# Patient Record
Sex: FEMALE | Race: WHITE | NOT HISPANIC OR LATINO | Employment: OTHER | URBAN - METROPOLITAN AREA
[De-identification: names, ages, dates, MRNs, and addresses within clinical notes are randomized per-mention and may not be internally consistent; named-entity substitution may affect disease eponyms.]

---

## 2017-01-03 ENCOUNTER — ALLSCRIPTS OFFICE VISIT (OUTPATIENT)
Dept: OTHER | Facility: OTHER | Age: 77
End: 2017-01-03

## 2017-01-11 ENCOUNTER — HOSPITAL ENCOUNTER (OUTPATIENT)
Dept: RADIOLOGY | Facility: CLINIC | Age: 77
Discharge: HOME | End: 2017-01-11
Payer: MEDICARE

## 2017-01-11 ENCOUNTER — ALLSCRIPTS OFFICE VISIT (OUTPATIENT)
Dept: OTHER | Facility: OTHER | Age: 77
End: 2017-01-11

## 2017-01-11 DIAGNOSIS — S92.351A CLOSED DISPLACED FRACTURE OF FIFTH METATARSAL BONE OF RIGHT FOOT: ICD-10-CM

## 2017-01-11 PROCEDURE — 73630 X-RAY EXAM OF FOOT: CPT

## 2017-01-17 ENCOUNTER — HOSPITAL ENCOUNTER (OUTPATIENT)
Facility: HOSPITAL | Age: 77
Setting detail: OBSERVATION
Discharge: HOME | End: 2017-01-18
Attending: EMERGENCY MEDICINE | Admitting: INTERNAL MEDICINE
Payer: MEDICARE

## 2017-01-17 ENCOUNTER — APPOINTMENT (EMERGENCY)
Dept: RADIOLOGY | Facility: HOSPITAL | Age: 77
End: 2017-01-17
Payer: MEDICARE

## 2017-01-17 DIAGNOSIS — R07.9 CHEST PAIN: Primary | ICD-10-CM

## 2017-01-17 LAB
ANION GAP SERPL CALCULATED.3IONS-SCNC: 11 MMOL/L (ref 4–13)
BASOPHILS # BLD AUTO: 0 THOUSANDS/ΜL (ref 0–0.1)
BASOPHILS NFR BLD AUTO: 1 % (ref 0–1)
BUN SERPL-MCNC: 11 MG/DL (ref 5–25)
CALCIUM SERPL-MCNC: 9.6 MG/DL (ref 8.3–10.1)
CHLORIDE SERPL-SCNC: 101 MMOL/L (ref 100–108)
CO2 SERPL-SCNC: 28 MMOL/L (ref 21–32)
CREAT SERPL-MCNC: 0.76 MG/DL (ref 0.6–1.3)
DEPRECATED D DIMER PPP: <190 NG/ML (FEU) (ref 190–520)
EOSINOPHIL # BLD AUTO: 0 THOUSAND/ΜL (ref 0–0.61)
EOSINOPHIL NFR BLD AUTO: 1 % (ref 0–6)
ERYTHROCYTE [DISTWIDTH] IN BLOOD BY AUTOMATED COUNT: 13.2 % (ref 11.6–15.1)
GFR SERPL CREATININE-BSD FRML MDRD: >60 ML/MIN/1.73SQ M
GLUCOSE SERPL-MCNC: 91 MG/DL (ref 65–140)
HCT VFR BLD AUTO: 42.3 % (ref 37–47)
HGB BLD-MCNC: 14 G/DL (ref 12–16)
INR PPP: 1.29 (ref 0.86–1.16)
LYMPHOCYTES # BLD AUTO: 2.7 THOUSANDS/ΜL (ref 0.6–4.47)
LYMPHOCYTES NFR BLD AUTO: 41 % (ref 14–44)
MCH RBC QN AUTO: 31.3 PG (ref 27–31)
MCHC RBC AUTO-ENTMCNC: 33.1 G/DL (ref 31.4–37.4)
MCV RBC AUTO: 95 FL (ref 82–98)
MONOCYTES # BLD AUTO: 0.5 THOUSAND/ΜL (ref 0.17–1.22)
MONOCYTES NFR BLD AUTO: 7 % (ref 4–12)
NEUTROPHILS # BLD AUTO: 3.3 THOUSANDS/ΜL (ref 1.85–7.62)
NEUTS SEG NFR BLD AUTO: 51 % (ref 43–75)
NRBC BLD AUTO-RTO: 0 /100 WBCS
PLATELET # BLD AUTO: 278 THOUSANDS/UL (ref 130–400)
PMV BLD AUTO: 7.1 FL (ref 8.9–12.7)
POTASSIUM SERPL-SCNC: 3.8 MMOL/L (ref 3.5–5.3)
PROTHROMBIN TIME: 13.7 SECONDS (ref 9.4–11.7)
RBC # BLD AUTO: 4.47 MILLION/UL (ref 4.2–5.4)
SODIUM SERPL-SCNC: 140 MMOL/L (ref 136–145)
TROPONIN I SERPL-MCNC: <0.02 NG/ML
TROPONIN I SERPL-MCNC: <0.02 NG/ML
WBC # BLD AUTO: 6.6 THOUSAND/UL (ref 4.8–10.8)

## 2017-01-17 PROCEDURE — 84484 ASSAY OF TROPONIN QUANT: CPT | Performed by: EMERGENCY MEDICINE

## 2017-01-17 PROCEDURE — 80048 BASIC METABOLIC PNL TOTAL CA: CPT | Performed by: EMERGENCY MEDICINE

## 2017-01-17 PROCEDURE — 36415 COLL VENOUS BLD VENIPUNCTURE: CPT | Performed by: EMERGENCY MEDICINE

## 2017-01-17 PROCEDURE — 99285 EMERGENCY DEPT VISIT HI MDM: CPT

## 2017-01-17 PROCEDURE — 84484 ASSAY OF TROPONIN QUANT: CPT | Performed by: FAMILY MEDICINE

## 2017-01-17 PROCEDURE — 71020 HB CHEST X-RAY 2VW FRONTAL&LATL: CPT

## 2017-01-17 PROCEDURE — 93005 ELECTROCARDIOGRAM TRACING: CPT | Performed by: EMERGENCY MEDICINE

## 2017-01-17 PROCEDURE — 85379 FIBRIN DEGRADATION QUANT: CPT | Performed by: EMERGENCY MEDICINE

## 2017-01-17 PROCEDURE — 85610 PROTHROMBIN TIME: CPT | Performed by: EMERGENCY MEDICINE

## 2017-01-17 PROCEDURE — 85025 COMPLETE CBC W/AUTO DIFF WBC: CPT | Performed by: EMERGENCY MEDICINE

## 2017-01-17 RX ORDER — CHOLESTYRAMINE LIGHT 4 G/5.7G
4 POWDER, FOR SUSPENSION ORAL 2 TIMES DAILY
Status: DISCONTINUED | OUTPATIENT
Start: 2017-01-17 | End: 2017-01-18 | Stop reason: HOSPADM

## 2017-01-17 RX ORDER — WARFARIN SODIUM 5 MG/1
5 TABLET ORAL
Status: COMPLETED | OUTPATIENT
Start: 2017-01-17 | End: 2017-01-17

## 2017-01-17 RX ORDER — PANTOPRAZOLE SODIUM 40 MG/1
40 TABLET, DELAYED RELEASE ORAL DAILY
Status: DISCONTINUED | OUTPATIENT
Start: 2017-01-18 | End: 2017-01-18 | Stop reason: HOSPADM

## 2017-01-17 RX ORDER — WARFARIN SODIUM 3 MG/1
3 TABLET ORAL
Status: DISCONTINUED | OUTPATIENT
Start: 2017-01-18 | End: 2017-01-18

## 2017-01-17 RX ORDER — ACETAMINOPHEN 325 MG/1
650 TABLET ORAL EVERY 6 HOURS PRN
Status: DISCONTINUED | OUTPATIENT
Start: 2017-01-17 | End: 2017-01-18 | Stop reason: HOSPADM

## 2017-01-17 RX ORDER — NEBIVOLOL 5 MG/1
5 TABLET ORAL
Status: DISCONTINUED | OUTPATIENT
Start: 2017-01-18 | End: 2017-01-18 | Stop reason: HOSPADM

## 2017-01-17 RX ORDER — NEBIVOLOL 10 MG/1
10 TABLET ORAL EVERY MORNING
Status: DISCONTINUED | OUTPATIENT
Start: 2017-01-18 | End: 2017-01-18 | Stop reason: HOSPADM

## 2017-01-17 RX ADMIN — ENOXAPARIN SODIUM 50 MG: 60 INJECTION SUBCUTANEOUS at 18:45

## 2017-01-17 RX ADMIN — WARFARIN SODIUM 5 MG: 5 TABLET ORAL at 21:41

## 2017-01-17 RX ADMIN — NITROGLYCERIN 1 INCH: 20 OINTMENT TOPICAL at 14:11

## 2017-01-17 RX ADMIN — ACETAMINOPHEN 650 MG: 325 TABLET, FILM COATED ORAL at 21:40

## 2017-01-18 ENCOUNTER — APPOINTMENT (OUTPATIENT)
Dept: NON INVASIVE DIAGNOSTICS | Facility: HOSPITAL | Age: 77
End: 2017-01-18
Payer: MEDICARE

## 2017-01-18 ENCOUNTER — APPOINTMENT (OUTPATIENT)
Dept: RADIOLOGY | Facility: HOSPITAL | Age: 77
End: 2017-01-18
Payer: MEDICARE

## 2017-01-18 VITALS
TEMPERATURE: 97.2 F | HEIGHT: 60 IN | SYSTOLIC BLOOD PRESSURE: 143 MMHG | BODY MASS INDEX: 19.69 KG/M2 | DIASTOLIC BLOOD PRESSURE: 64 MMHG | OXYGEN SATURATION: 99 % | WEIGHT: 100.31 LBS | RESPIRATION RATE: 18 BRPM | HEART RATE: 74 BPM

## 2017-01-18 PROBLEM — Z86.711 HISTORY OF PULMONARY EMBOLISM: Status: ACTIVE | Noted: 2017-01-18

## 2017-01-18 PROBLEM — I16.0 HYPERTENSIVE URGENCY: Status: RESOLVED | Noted: 2017-01-18 | Resolved: 2017-01-18

## 2017-01-18 PROBLEM — I16.0 HYPERTENSIVE URGENCY: Status: ACTIVE | Noted: 2017-01-18

## 2017-01-18 PROBLEM — R91.1 NODULE OF RIGHT LUNG: Status: ACTIVE | Noted: 2017-01-18

## 2017-01-18 PROBLEM — R07.9 CHEST PAIN: Status: ACTIVE | Noted: 2017-01-18

## 2017-01-18 LAB
INR PPP: 1.37 (ref 0.86–1.16)
PROTHROMBIN TIME: 14.5 SECONDS (ref 9.4–11.7)
TROPONIN I SERPL-MCNC: <0.02 NG/ML

## 2017-01-18 PROCEDURE — 93017 CV STRESS TEST TRACING ONLY: CPT

## 2017-01-18 PROCEDURE — G8987 SELF CARE CURRENT STATUS: HCPCS

## 2017-01-18 PROCEDURE — 78452 HT MUSCLE IMAGE SPECT MULT: CPT

## 2017-01-18 PROCEDURE — 87081 CULTURE SCREEN ONLY: CPT | Performed by: INTERNAL MEDICINE

## 2017-01-18 PROCEDURE — 85610 PROTHROMBIN TIME: CPT | Performed by: FAMILY MEDICINE

## 2017-01-18 PROCEDURE — 84484 ASSAY OF TROPONIN QUANT: CPT | Performed by: FAMILY MEDICINE

## 2017-01-18 PROCEDURE — G8988 SELF CARE GOAL STATUS: HCPCS

## 2017-01-18 PROCEDURE — 97165 OT EVAL LOW COMPLEX 30 MIN: CPT

## 2017-01-18 PROCEDURE — G8979 MOBILITY GOAL STATUS: HCPCS

## 2017-01-18 PROCEDURE — A9502 TC99M TETROFOSMIN: HCPCS

## 2017-01-18 PROCEDURE — 93306 TTE W/DOPPLER COMPLETE: CPT

## 2017-01-18 PROCEDURE — G8978 MOBILITY CURRENT STATUS: HCPCS

## 2017-01-18 PROCEDURE — 97161 PT EVAL LOW COMPLEX 20 MIN: CPT

## 2017-01-18 PROCEDURE — 71250 CT THORAX DX C-: CPT

## 2017-01-18 RX ORDER — WARFARIN SODIUM 5 MG/1
5 TABLET ORAL
Status: DISCONTINUED | OUTPATIENT
Start: 2017-01-18 | End: 2017-01-18 | Stop reason: HOSPADM

## 2017-01-18 RX ADMIN — REGADENOSON 0.4 MG: 0.08 INJECTION, SOLUTION INTRAVENOUS at 13:56

## 2017-01-18 RX ADMIN — ENOXAPARIN SODIUM 50 MG: 60 INJECTION SUBCUTANEOUS at 09:18

## 2017-01-18 RX ADMIN — NEBIVOLOL HYDROCHLORIDE 10 MG: 10 TABLET ORAL at 09:17

## 2017-01-18 RX ADMIN — NEBIVOLOL HYDROCHLORIDE 5 MG: 5 TABLET ORAL at 12:23

## 2017-01-18 RX ADMIN — ENOXAPARIN SODIUM 50 MG: 60 INJECTION SUBCUTANEOUS at 18:34

## 2017-01-18 RX ADMIN — WARFARIN SODIUM 5 MG: 5 TABLET ORAL at 18:34

## 2017-01-18 RX ADMIN — PANTOPRAZOLE SODIUM 40 MG: 40 TABLET, DELAYED RELEASE ORAL at 09:16

## 2017-01-18 RX ADMIN — ACETAMINOPHEN 650 MG: 325 TABLET, FILM COATED ORAL at 06:26

## 2017-01-18 RX ADMIN — CHOLESTYRAMINE 4 G: 4 POWDER, FOR SUSPENSION ORAL at 09:15

## 2017-01-19 LAB — MRSA NOSE QL CULT: NORMAL

## 2017-02-08 ENCOUNTER — HOSPITAL ENCOUNTER (OUTPATIENT)
Dept: RADIOLOGY | Facility: CLINIC | Age: 77
Discharge: HOME | End: 2017-02-08
Payer: MEDICARE

## 2017-02-08 ENCOUNTER — ALLSCRIPTS OFFICE VISIT (OUTPATIENT)
Dept: OTHER | Facility: OTHER | Age: 77
End: 2017-02-08

## 2017-02-08 DIAGNOSIS — S92.351A CLOSED DISPLACED FRACTURE OF FIFTH METATARSAL BONE OF RIGHT FOOT: ICD-10-CM

## 2017-02-08 PROCEDURE — 73630 X-RAY EXAM OF FOOT: CPT

## 2017-02-27 ENCOUNTER — HOSPITAL ENCOUNTER (OUTPATIENT)
Dept: RADIOLOGY | Facility: CLINIC | Age: 77
Discharge: HOME | End: 2017-02-27
Payer: MEDICARE

## 2017-02-27 ENCOUNTER — ALLSCRIPTS OFFICE VISIT (OUTPATIENT)
Dept: OTHER | Facility: OTHER | Age: 77
End: 2017-02-27

## 2017-02-27 DIAGNOSIS — S92.351A CLOSED DISPLACED FRACTURE OF FIFTH METATARSAL BONE OF RIGHT FOOT: ICD-10-CM

## 2017-02-27 PROCEDURE — 73630 X-RAY EXAM OF FOOT: CPT

## 2017-04-10 ENCOUNTER — ALLSCRIPTS OFFICE VISIT (OUTPATIENT)
Dept: OTHER | Facility: OTHER | Age: 77
End: 2017-04-10

## 2017-04-10 ENCOUNTER — HOSPITAL ENCOUNTER (OUTPATIENT)
Dept: RADIOLOGY | Facility: CLINIC | Age: 77
Discharge: HOME | End: 2017-04-10
Payer: MEDICARE

## 2017-04-10 ENCOUNTER — TRANSCRIBE ORDERS (OUTPATIENT)
Dept: ADMINISTRATIVE | Facility: HOSPITAL | Age: 77
End: 2017-04-10

## 2017-04-10 DIAGNOSIS — S92.351A CLOSED DISPLACED FRACTURE OF FIFTH METATARSAL BONE OF RIGHT FOOT: ICD-10-CM

## 2017-04-10 DIAGNOSIS — M81.0 SENILE OSTEOPOROSIS: Primary | ICD-10-CM

## 2017-04-10 PROCEDURE — 73630 X-RAY EXAM OF FOOT: CPT

## 2017-04-17 ENCOUNTER — HOSPITAL ENCOUNTER (OUTPATIENT)
Dept: RADIOLOGY | Facility: HOSPITAL | Age: 77
Discharge: HOME | End: 2017-04-17
Payer: MEDICARE

## 2017-04-17 DIAGNOSIS — M81.0 SENILE OSTEOPOROSIS: ICD-10-CM

## 2017-04-17 PROCEDURE — 77080 DXA BONE DENSITY AXIAL: CPT

## 2017-06-21 ENCOUNTER — ALLSCRIPTS OFFICE VISIT (OUTPATIENT)
Dept: OTHER | Facility: OTHER | Age: 77
End: 2017-06-21

## 2017-06-21 DIAGNOSIS — M75.101 RIGHT ROTATOR CUFF TEAR: ICD-10-CM

## 2017-07-03 ENCOUNTER — GENERIC CONVERSION - ENCOUNTER (OUTPATIENT)
Dept: OTHER | Facility: OTHER | Age: 77
End: 2017-07-03

## 2017-08-02 ENCOUNTER — ALLSCRIPTS OFFICE VISIT (OUTPATIENT)
Dept: OTHER | Facility: OTHER | Age: 77
End: 2017-08-02

## 2017-08-02 DIAGNOSIS — M75.101 RIGHT ROTATOR CUFF TEAR: ICD-10-CM

## 2017-08-07 ENCOUNTER — HOSPITAL ENCOUNTER (OUTPATIENT)
Dept: RADIOLOGY | Facility: HOSPITAL | Age: 77
Discharge: HOME | End: 2017-08-07
Attending: ORTHOPAEDIC SURGERY
Payer: MEDICARE

## 2017-08-07 DIAGNOSIS — M75.101 RIGHT ROTATOR CUFF TEAR: ICD-10-CM

## 2017-08-07 PROCEDURE — 73221 MRI JOINT UPR EXTREM W/O DYE: CPT

## 2017-08-25 ENCOUNTER — ALLSCRIPTS OFFICE VISIT (OUTPATIENT)
Dept: OTHER | Facility: OTHER | Age: 77
End: 2017-08-25

## 2017-08-31 ENCOUNTER — APPOINTMENT (OUTPATIENT)
Dept: LAB | Facility: HOSPITAL | Age: 77
End: 2017-08-31
Attending: ORTHOPAEDIC SURGERY
Payer: MEDICARE

## 2017-08-31 ENCOUNTER — TRANSCRIBE ORDERS (OUTPATIENT)
Dept: ADMINISTRATIVE | Facility: HOSPITAL | Age: 77
End: 2017-08-31

## 2017-08-31 ENCOUNTER — LAB CONVERSION - ENCOUNTER (OUTPATIENT)
Dept: OTHER | Facility: OTHER | Age: 77
End: 2017-08-31

## 2017-08-31 ENCOUNTER — APPOINTMENT (OUTPATIENT)
Dept: PREADMISSION TESTING | Facility: HOSPITAL | Age: 77
End: 2017-08-31
Payer: MEDICARE

## 2017-08-31 VITALS — BODY MASS INDEX: 19.44 KG/M2 | HEIGHT: 60 IN | WEIGHT: 99 LBS

## 2017-08-31 DIAGNOSIS — Z01.818 PRE-OP TESTING: ICD-10-CM

## 2017-08-31 DIAGNOSIS — Z01.818 PRE-OP TESTING: Primary | ICD-10-CM

## 2017-08-31 LAB
ANION GAP SERPL CALCULATED.3IONS-SCNC: 8 MMOL/L (ref 4–13)
APTT PPP: 29 SECONDS (ref 24–33)
ATRIAL RATE: 74 BPM
BASOPHILS # BLD AUTO: 0 THOUSANDS/ΜL (ref 0–0.1)
BASOPHILS NFR BLD AUTO: 1 % (ref 0–1)
BUN SERPL-MCNC: 15 MG/DL (ref 5–25)
CALCIUM SERPL-MCNC: 9.6 MG/DL (ref 8.3–10.1)
CHLORIDE SERPL-SCNC: 102 MMOL/L (ref 100–108)
CO2 SERPL-SCNC: 28 MMOL/L (ref 21–32)
CREAT SERPL-MCNC: 0.8 MG/DL (ref 0.6–1.3)
EOSINOPHIL # BLD AUTO: 0.1 THOUSAND/ΜL (ref 0–0.61)
EOSINOPHIL NFR BLD AUTO: 1 % (ref 0–6)
ERYTHROCYTE [DISTWIDTH] IN BLOOD BY AUTOMATED COUNT: 12.4 % (ref 11.6–15.1)
GFR SERPL CREATININE-BSD FRML MDRD: 72 ML/MIN/1.73SQ M
GLUCOSE P FAST SERPL-MCNC: 86 MG/DL (ref 65–99)
HCT VFR BLD AUTO: 36.6 % (ref 37–47)
HGB BLD-MCNC: 12.2 G/DL (ref 12–16)
INR PPP: 1.56 (ref 0.86–1.16)
LYMPHOCYTES # BLD AUTO: 2.4 THOUSANDS/ΜL (ref 0.6–4.47)
LYMPHOCYTES NFR BLD AUTO: 39 % (ref 14–44)
MCH RBC QN AUTO: 31 PG (ref 27–31)
MCHC RBC AUTO-ENTMCNC: 33.4 G/DL (ref 31.4–37.4)
MCV RBC AUTO: 93 FL (ref 82–98)
MONOCYTES # BLD AUTO: 0.5 THOUSAND/ΜL (ref 0.17–1.22)
MONOCYTES NFR BLD AUTO: 8 % (ref 4–12)
NEUTROPHILS # BLD AUTO: 3.2 THOUSANDS/ΜL (ref 1.85–7.62)
NEUTS SEG NFR BLD AUTO: 52 % (ref 43–75)
NRBC BLD AUTO-RTO: 0 /100 WBCS
P AXIS: 25 DEGREES
PLATELET # BLD AUTO: 295 THOUSANDS/UL (ref 130–400)
PMV BLD AUTO: 7.2 FL (ref 8.9–12.7)
POTASSIUM SERPL-SCNC: 4.3 MMOL/L (ref 3.5–5.3)
PR INTERVAL: 126 MS
PROTHROMBIN TIME: 16.5 SECONDS (ref 9.4–11.7)
QRS AXIS: 5 DEGREES
QRSD INTERVAL: 82 MS
QT INTERVAL: 390 MS
QTC INTERVAL: 432 MS
RBC # BLD AUTO: 3.94 MILLION/UL (ref 4.2–5.4)
SODIUM SERPL-SCNC: 138 MMOL/L (ref 136–145)
T WAVE AXIS: 7 DEGREES
VENTRICULAR RATE: 74 BPM
WBC # BLD AUTO: 6.1 THOUSAND/UL (ref 4.8–10.8)

## 2017-08-31 PROCEDURE — 85025 COMPLETE CBC W/AUTO DIFF WBC: CPT | Performed by: ORTHOPAEDIC SURGERY

## 2017-08-31 PROCEDURE — 80048 BASIC METABOLIC PNL TOTAL CA: CPT

## 2017-08-31 PROCEDURE — 93005 ELECTROCARDIOGRAM TRACING: CPT

## 2017-08-31 PROCEDURE — 85610 PROTHROMBIN TIME: CPT | Performed by: ORTHOPAEDIC SURGERY

## 2017-08-31 PROCEDURE — 36415 COLL VENOUS BLD VENIPUNCTURE: CPT | Performed by: ORTHOPAEDIC SURGERY

## 2017-08-31 PROCEDURE — 85730 THROMBOPLASTIN TIME PARTIAL: CPT

## 2017-08-31 RX ORDER — RISEDRONATE SODIUM 150 MG/1
150 TABLET, FILM COATED ORAL
COMMUNITY

## 2017-09-10 ENCOUNTER — ANESTHESIA EVENT (OUTPATIENT)
Dept: PERIOP | Facility: AMBULARY SURGERY CENTER | Age: 77
End: 2017-09-10
Payer: MEDICARE

## 2017-09-11 ENCOUNTER — ANESTHESIA (OUTPATIENT)
Dept: PERIOP | Facility: AMBULARY SURGERY CENTER | Age: 77
End: 2017-09-11
Payer: MEDICARE

## 2017-09-11 ENCOUNTER — HOSPITAL ENCOUNTER (OUTPATIENT)
Facility: HOSPITAL | Age: 77
Setting detail: OUTPATIENT SURGERY
Discharge: HOME | End: 2017-09-12
Attending: ORTHOPAEDIC SURGERY | Admitting: ORTHOPAEDIC SURGERY
Payer: MEDICARE

## 2017-09-11 DIAGNOSIS — R07.9 CHEST PAIN, UNSPECIFIED TYPE: Primary | ICD-10-CM

## 2017-09-11 PROBLEM — M75.121 COMPLETE TEAR OF RIGHT ROTATOR CUFF: Status: ACTIVE | Noted: 2017-09-11

## 2017-09-11 PROCEDURE — 93005 ELECTROCARDIOGRAM TRACING: CPT | Performed by: NURSE PRACTITIONER

## 2017-09-11 PROCEDURE — 93005 ELECTROCARDIOGRAM TRACING: CPT | Performed by: ANESTHESIOLOGY

## 2017-09-11 PROCEDURE — C1713 ANCHOR/SCREW BN/BN,TIS/BN: HCPCS | Performed by: ORTHOPAEDIC SURGERY

## 2017-09-11 PROCEDURE — 87081 CULTURE SCREEN ONLY: CPT | Performed by: ORTHOPAEDIC SURGERY

## 2017-09-11 DEVICE — SYSTEM IMPLANT SPEEDBRIDGE W/4.75 BCMPS SWIVELOCK C: Type: IMPLANTABLE DEVICE | Site: SHOULDER | Status: FUNCTIONAL

## 2017-09-11 RX ORDER — WARFARIN SODIUM 3 MG/1
3 TABLET ORAL
Status: DISCONTINUED | OUTPATIENT
Start: 2017-09-11 | End: 2017-09-12 | Stop reason: HOSPADM

## 2017-09-11 RX ORDER — PANTOPRAZOLE SODIUM 40 MG/1
40 TABLET, DELAYED RELEASE ORAL EVERY MORNING
Status: DISCONTINUED | OUTPATIENT
Start: 2017-09-12 | End: 2017-09-12 | Stop reason: HOSPADM

## 2017-09-11 RX ORDER — FENTANYL CITRATE 50 UG/ML
INJECTION, SOLUTION INTRAMUSCULAR; INTRAVENOUS AS NEEDED
Status: DISCONTINUED | OUTPATIENT
Start: 2017-09-11 | End: 2017-09-11 | Stop reason: SURG

## 2017-09-11 RX ORDER — FENTANYL CITRATE/PF 50 MCG/ML
25 SYRINGE (ML) INJECTION
Status: COMPLETED | OUTPATIENT
Start: 2017-09-11 | End: 2017-09-11

## 2017-09-11 RX ORDER — NEBIVOLOL 5 MG/1
5 TABLET ORAL
Status: DISCONTINUED | OUTPATIENT
Start: 2017-09-12 | End: 2017-09-12 | Stop reason: HOSPADM

## 2017-09-11 RX ORDER — SODIUM CHLORIDE, SODIUM LACTATE, POTASSIUM CHLORIDE, CALCIUM CHLORIDE 600; 310; 30; 20 MG/100ML; MG/100ML; MG/100ML; MG/100ML
100 INJECTION, SOLUTION INTRAVENOUS CONTINUOUS
Status: DISCONTINUED | OUTPATIENT
Start: 2017-09-11 | End: 2017-09-11

## 2017-09-11 RX ORDER — ONDANSETRON 2 MG/ML
4 INJECTION INTRAMUSCULAR; INTRAVENOUS ONCE AS NEEDED
Status: COMPLETED | OUTPATIENT
Start: 2017-09-11 | End: 2017-09-11

## 2017-09-11 RX ORDER — PROPOFOL 10 MG/ML
INJECTION, EMULSION INTRAVENOUS AS NEEDED
Status: DISCONTINUED | OUTPATIENT
Start: 2017-09-11 | End: 2017-09-11 | Stop reason: SURG

## 2017-09-11 RX ORDER — OXYCODONE HYDROCHLORIDE AND ACETAMINOPHEN 5; 325 MG/1; MG/1
1 TABLET ORAL EVERY 4 HOURS PRN
Status: DISCONTINUED | OUTPATIENT
Start: 2017-09-11 | End: 2017-09-11 | Stop reason: SINTOL

## 2017-09-11 RX ORDER — CEFAZOLIN SODIUM 1 G/3ML
INJECTION, POWDER, FOR SOLUTION INTRAMUSCULAR; INTRAVENOUS AS NEEDED
Status: DISCONTINUED | OUTPATIENT
Start: 2017-09-11 | End: 2017-09-11 | Stop reason: SURG

## 2017-09-11 RX ORDER — SODIUM CHLORIDE, SODIUM LACTATE, POTASSIUM CHLORIDE, CALCIUM CHLORIDE 600; 310; 30; 20 MG/100ML; MG/100ML; MG/100ML; MG/100ML
75 INJECTION, SOLUTION INTRAVENOUS CONTINUOUS
Status: DISCONTINUED | OUTPATIENT
Start: 2017-09-11 | End: 2017-09-12

## 2017-09-11 RX ORDER — ACETAMINOPHEN 325 MG/1
975 TABLET ORAL EVERY 8 HOURS SCHEDULED
Status: DISCONTINUED | OUTPATIENT
Start: 2017-09-11 | End: 2017-09-12 | Stop reason: HOSPADM

## 2017-09-11 RX ORDER — ACETAMINOPHEN 325 MG/1
650 TABLET ORAL EVERY 6 HOURS PRN
Status: DISCONTINUED | OUTPATIENT
Start: 2017-09-11 | End: 2017-09-12 | Stop reason: HOSPADM

## 2017-09-11 RX ORDER — METOCLOPRAMIDE HYDROCHLORIDE 5 MG/ML
INJECTION INTRAMUSCULAR; INTRAVENOUS AS NEEDED
Status: DISCONTINUED | OUTPATIENT
Start: 2017-09-11 | End: 2017-09-11 | Stop reason: SURG

## 2017-09-11 RX ORDER — SUCCINYLCHOLINE CHLORIDE 20 MG/ML
INJECTION INTRAMUSCULAR; INTRAVENOUS AS NEEDED
Status: DISCONTINUED | OUTPATIENT
Start: 2017-09-11 | End: 2017-09-11 | Stop reason: SURG

## 2017-09-11 RX ORDER — ONDANSETRON 4 MG/1
4 TABLET, ORALLY DISINTEGRATING ORAL EVERY 6 HOURS PRN
Status: DISCONTINUED | OUTPATIENT
Start: 2017-09-11 | End: 2017-09-12 | Stop reason: HOSPADM

## 2017-09-11 RX ORDER — NEBIVOLOL 10 MG/1
10 TABLET ORAL EVERY MORNING
Status: DISCONTINUED | OUTPATIENT
Start: 2017-09-12 | End: 2017-09-12 | Stop reason: HOSPADM

## 2017-09-11 RX ORDER — ONDANSETRON 2 MG/ML
INJECTION INTRAMUSCULAR; INTRAVENOUS AS NEEDED
Status: DISCONTINUED | OUTPATIENT
Start: 2017-09-11 | End: 2017-09-11 | Stop reason: SURG

## 2017-09-11 RX ORDER — MIDAZOLAM HYDROCHLORIDE 1 MG/ML
INJECTION INTRAMUSCULAR; INTRAVENOUS AS NEEDED
Status: DISCONTINUED | OUTPATIENT
Start: 2017-09-11 | End: 2017-09-11 | Stop reason: SURG

## 2017-09-11 RX ADMIN — SODIUM CHLORIDE, SODIUM LACTATE, POTASSIUM CHLORIDE, AND CALCIUM CHLORIDE 100 ML/HR: .6; .31; .03; .02 INJECTION, SOLUTION INTRAVENOUS at 11:45

## 2017-09-11 RX ADMIN — PROPOFOL 120 MG: 10 INJECTION, EMULSION INTRAVENOUS at 13:18

## 2017-09-11 RX ADMIN — FENTANYL CITRATE 25 MCG: 50 INJECTION INTRAMUSCULAR; INTRAVENOUS at 15:20

## 2017-09-11 RX ADMIN — FENTANYL CITRATE 25 MCG: 50 INJECTION INTRAMUSCULAR; INTRAVENOUS at 14:57

## 2017-09-11 RX ADMIN — CEFAZOLIN 2000 MG: 1 INJECTION, POWDER, FOR SOLUTION INTRAVENOUS at 13:08

## 2017-09-11 RX ADMIN — FENTANYL CITRATE 25 MCG: 50 INJECTION INTRAMUSCULAR; INTRAVENOUS at 14:37

## 2017-09-11 RX ADMIN — MIDAZOLAM HYDROCHLORIDE 1 MG: 1 INJECTION, SOLUTION INTRAMUSCULAR; INTRAVENOUS at 12:26

## 2017-09-11 RX ADMIN — ACETAMINOPHEN 975 MG: 325 TABLET, FILM COATED ORAL at 21:17

## 2017-09-11 RX ADMIN — ONDANSETRON 4 MG: 2 INJECTION INTRAMUSCULAR; INTRAVENOUS at 14:51

## 2017-09-11 RX ADMIN — MIDAZOLAM HYDROCHLORIDE 1 MG: 1 INJECTION, SOLUTION INTRAMUSCULAR; INTRAVENOUS at 13:18

## 2017-09-11 RX ADMIN — FENTANYL CITRATE 50 MCG: 50 INJECTION, SOLUTION INTRAMUSCULAR; INTRAVENOUS at 13:37

## 2017-09-11 RX ADMIN — SUCCINYLCHOLINE CHLORIDE 100 MG: 20 INJECTION, SOLUTION INTRAMUSCULAR; INTRAVENOUS at 13:18

## 2017-09-11 RX ADMIN — FENTANYL CITRATE 50 MCG: 50 INJECTION, SOLUTION INTRAMUSCULAR; INTRAVENOUS at 12:26

## 2017-09-11 RX ADMIN — ROPIVACAINE HYDROCHLORIDE: 2 INJECTION, SOLUTION EPIDURAL; INFILTRATION at 14:58

## 2017-09-11 RX ADMIN — METOCLOPRAMIDE HYDROCHLORIDE 10 MG: 5 INJECTION INTRAMUSCULAR; INTRAVENOUS at 13:11

## 2017-09-11 RX ADMIN — SODIUM CHLORIDE, SODIUM LACTATE, POTASSIUM CHLORIDE, AND CALCIUM CHLORIDE 75 ML/HR: .6; .31; .03; .02 INJECTION, SOLUTION INTRAVENOUS at 22:08

## 2017-09-11 RX ADMIN — SODIUM CHLORIDE, SODIUM LACTATE, POTASSIUM CHLORIDE, AND CALCIUM CHLORIDE: .6; .31; .03; .02 INJECTION, SOLUTION INTRAVENOUS at 13:08

## 2017-09-11 RX ADMIN — ONDANSETRON 4 MG: 2 INJECTION INTRAMUSCULAR; INTRAVENOUS at 13:10

## 2017-09-11 RX ADMIN — ONDANSETRON 4 MG: 4 TABLET, ORALLY DISINTEGRATING ORAL at 19:36

## 2017-09-11 RX ADMIN — WARFARIN SODIUM 3 MG: 3 TABLET ORAL at 21:17

## 2017-09-11 RX ADMIN — FENTANYL CITRATE 25 MCG: 50 INJECTION INTRAMUSCULAR; INTRAVENOUS at 14:28

## 2017-09-12 VITALS
HEIGHT: 60 IN | WEIGHT: 99 LBS | TEMPERATURE: 99.2 F | DIASTOLIC BLOOD PRESSURE: 67 MMHG | RESPIRATION RATE: 18 BRPM | HEART RATE: 98 BPM | BODY MASS INDEX: 19.44 KG/M2 | OXYGEN SATURATION: 90 % | SYSTOLIC BLOOD PRESSURE: 140 MMHG

## 2017-09-12 LAB
ALBUMIN SERPL BCP-MCNC: 3.1 G/DL (ref 3.5–5)
ALP SERPL-CCNC: 42 U/L (ref 46–116)
ALT SERPL W P-5'-P-CCNC: 31 U/L (ref 12–78)
ANION GAP SERPL CALCULATED.3IONS-SCNC: 11 MMOL/L (ref 4–13)
AST SERPL W P-5'-P-CCNC: 29 U/L (ref 5–45)
BILIRUB SERPL-MCNC: 0.4 MG/DL (ref 0.2–1)
BUN SERPL-MCNC: 8 MG/DL (ref 5–25)
CALCIUM SERPL-MCNC: 7.6 MG/DL (ref 8.3–10.1)
CHLORIDE SERPL-SCNC: 98 MMOL/L (ref 100–108)
CHOLEST SERPL-MCNC: 143 MG/DL (ref 50–200)
CO2 SERPL-SCNC: 25 MMOL/L (ref 21–32)
CREAT SERPL-MCNC: 0.88 MG/DL (ref 0.6–1.3)
ERYTHROCYTE [DISTWIDTH] IN BLOOD BY AUTOMATED COUNT: 12.6 % (ref 11.6–15.1)
EST. AVERAGE GLUCOSE BLD GHB EST-MCNC: 108 MG/DL
GFR SERPL CREATININE-BSD FRML MDRD: 64 ML/MIN/1.73SQ M
GLUCOSE SERPL-MCNC: 111 MG/DL (ref 65–140)
HBA1C MFR BLD: 5.4 % (ref 4.2–6.3)
HCT VFR BLD AUTO: 32.6 % (ref 37–47)
HDLC SERPL-MCNC: 63 MG/DL (ref 40–60)
HGB BLD-MCNC: 10.7 G/DL (ref 12–16)
INR PPP: 1.08 (ref 0.86–1.16)
LDLC SERPL CALC-MCNC: 49 MG/DL (ref 0–100)
MAGNESIUM SERPL-MCNC: 1.5 MG/DL (ref 1.6–2.6)
MCH RBC QN AUTO: 29.8 PG (ref 27–31)
MCHC RBC AUTO-ENTMCNC: 32.9 G/DL (ref 31.4–37.4)
MCV RBC AUTO: 91 FL (ref 82–98)
PLATELET # BLD AUTO: 267 THOUSANDS/UL (ref 130–400)
PMV BLD AUTO: 7.1 FL (ref 8.9–12.7)
POTASSIUM SERPL-SCNC: 3.8 MMOL/L (ref 3.5–5.3)
PROT SERPL-MCNC: 6.5 G/DL (ref 6.4–8.2)
PROTHROMBIN TIME: 11.4 SECONDS (ref 9.4–11.7)
RBC # BLD AUTO: 3.6 MILLION/UL (ref 4.2–5.4)
SODIUM SERPL-SCNC: 134 MMOL/L (ref 136–145)
T4 FREE SERPL-MCNC: 0.89 NG/DL (ref 0.76–1.46)
TRIGL SERPL-MCNC: 156 MG/DL
TROPONIN I SERPL-MCNC: 0.02 NG/ML
TROPONIN I SERPL-MCNC: <0.02 NG/ML
TROPONIN I SERPL-MCNC: <0.02 NG/ML
TSH SERPL DL<=0.05 MIU/L-ACNC: 2.38 UIU/ML (ref 0.36–3.74)
WBC # BLD AUTO: 9.6 THOUSAND/UL (ref 4.8–10.8)

## 2017-09-12 PROCEDURE — 85027 COMPLETE CBC AUTOMATED: CPT | Performed by: NURSE PRACTITIONER

## 2017-09-12 PROCEDURE — 80061 LIPID PANEL: CPT | Performed by: NURSE PRACTITIONER

## 2017-09-12 PROCEDURE — 83735 ASSAY OF MAGNESIUM: CPT | Performed by: NURSE PRACTITIONER

## 2017-09-12 PROCEDURE — 80053 COMPREHEN METABOLIC PANEL: CPT | Performed by: NURSE PRACTITIONER

## 2017-09-12 PROCEDURE — 83036 HEMOGLOBIN GLYCOSYLATED A1C: CPT | Performed by: NURSE PRACTITIONER

## 2017-09-12 PROCEDURE — 84443 ASSAY THYROID STIM HORMONE: CPT | Performed by: NURSE PRACTITIONER

## 2017-09-12 PROCEDURE — 84484 ASSAY OF TROPONIN QUANT: CPT | Performed by: NURSE PRACTITIONER

## 2017-09-12 PROCEDURE — 85610 PROTHROMBIN TIME: CPT | Performed by: NURSE PRACTITIONER

## 2017-09-12 PROCEDURE — 84439 ASSAY OF FREE THYROXINE: CPT | Performed by: NURSE PRACTITIONER

## 2017-09-12 RX ORDER — MAGNESIUM SULFATE HEPTAHYDRATE 40 MG/ML
2 INJECTION, SOLUTION INTRAVENOUS ONCE
Status: COMPLETED | OUTPATIENT
Start: 2017-09-12 | End: 2017-09-12

## 2017-09-12 RX ORDER — TRAMADOL HYDROCHLORIDE 50 MG/1
50 TABLET ORAL EVERY 6 HOURS PRN
Status: DISCONTINUED | OUTPATIENT
Start: 2017-09-12 | End: 2017-09-12 | Stop reason: HOSPADM

## 2017-09-12 RX ORDER — SODIUM CHLORIDE 9 MG/ML
75 INJECTION, SOLUTION INTRAVENOUS CONTINUOUS
Status: DISCONTINUED | OUTPATIENT
Start: 2017-09-12 | End: 2017-09-12

## 2017-09-12 RX ORDER — MORPHINE SULFATE 2 MG/ML
1 INJECTION, SOLUTION INTRAMUSCULAR; INTRAVENOUS
Status: DISCONTINUED | OUTPATIENT
Start: 2017-09-12 | End: 2017-09-12

## 2017-09-12 RX ADMIN — MAGNESIUM SULFATE HEPTAHYDRATE 2 G: 40 INJECTION, SOLUTION INTRAVENOUS at 09:44

## 2017-09-12 RX ADMIN — ONDANSETRON 4 MG: 4 TABLET, ORALLY DISINTEGRATING ORAL at 09:42

## 2017-09-12 RX ADMIN — NEBIVOLOL HYDROCHLORIDE 10 MG: 10 TABLET ORAL at 09:43

## 2017-09-12 RX ADMIN — NEBIVOLOL HYDROCHLORIDE 5 MG: 5 TABLET ORAL at 13:53

## 2017-09-12 RX ADMIN — TRAMADOL HYDROCHLORIDE 50 MG: 50 TABLET, FILM COATED ORAL at 01:05

## 2017-09-12 RX ADMIN — ACETAMINOPHEN 975 MG: 325 TABLET, FILM COATED ORAL at 06:31

## 2017-09-12 RX ADMIN — ONDANSETRON 4 MG: 4 TABLET, ORALLY DISINTEGRATING ORAL at 02:39

## 2017-09-12 RX ADMIN — SODIUM CHLORIDE 75 ML/HR: 0.9 INJECTION, SOLUTION INTRAVENOUS at 09:44

## 2017-09-12 RX ADMIN — PANTOPRAZOLE SODIUM 40 MG: 40 TABLET, DELAYED RELEASE ORAL at 09:43

## 2017-09-13 LAB — MRSA NOSE QL CULT: NORMAL

## 2017-09-14 ENCOUNTER — APPOINTMENT (EMERGENCY)
Dept: RADIOLOGY | Facility: HOSPITAL | Age: 77
End: 2017-09-14
Payer: MEDICARE

## 2017-09-14 ENCOUNTER — HOSPITAL ENCOUNTER (EMERGENCY)
Facility: HOSPITAL | Age: 77
Discharge: HOME | End: 2017-09-14
Attending: EMERGENCY MEDICINE | Admitting: EMERGENCY MEDICINE
Payer: MEDICARE

## 2017-09-14 VITALS
RESPIRATION RATE: 21 BRPM | WEIGHT: 99 LBS | SYSTOLIC BLOOD PRESSURE: 159 MMHG | HEART RATE: 89 BPM | OXYGEN SATURATION: 97 % | BODY MASS INDEX: 19.33 KG/M2 | DIASTOLIC BLOOD PRESSURE: 97 MMHG | TEMPERATURE: 98.8 F

## 2017-09-14 DIAGNOSIS — C34.90 LUNG CANCER (HCC): ICD-10-CM

## 2017-09-14 DIAGNOSIS — J18.9 PNEUMONIA: Primary | ICD-10-CM

## 2017-09-14 LAB
ALBUMIN SERPL BCP-MCNC: 3.1 G/DL (ref 3.5–5)
ALP SERPL-CCNC: 42 U/L (ref 46–116)
ALT SERPL W P-5'-P-CCNC: 21 U/L (ref 12–78)
ANION GAP SERPL CALCULATED.3IONS-SCNC: 8 MMOL/L (ref 4–13)
AST SERPL W P-5'-P-CCNC: 33 U/L (ref 5–45)
BACTERIA UR QL AUTO: ABNORMAL /HPF
BASOPHILS # BLD AUTO: 0 THOUSANDS/ΜL (ref 0–0.1)
BASOPHILS NFR BLD AUTO: 0 % (ref 0–1)
BILIRUB SERPL-MCNC: 0.4 MG/DL (ref 0.2–1)
BILIRUB UR QL STRIP: NEGATIVE
BUN SERPL-MCNC: 7 MG/DL (ref 5–25)
CALCIUM SERPL-MCNC: 8.1 MG/DL (ref 8.3–10.1)
CHLORIDE SERPL-SCNC: 105 MMOL/L (ref 100–108)
CLARITY UR: CLEAR
CO2 SERPL-SCNC: 26 MMOL/L (ref 21–32)
COLOR UR: YELLOW
CREAT SERPL-MCNC: 0.71 MG/DL (ref 0.6–1.3)
DEPRECATED D DIMER PPP: 880 NG/ML (FEU) (ref 190–520)
EOSINOPHIL # BLD AUTO: 0.1 THOUSAND/ΜL (ref 0–0.61)
EOSINOPHIL NFR BLD AUTO: 1 % (ref 0–6)
ERYTHROCYTE [DISTWIDTH] IN BLOOD BY AUTOMATED COUNT: 12.7 % (ref 11.6–15.1)
GFR SERPL CREATININE-BSD FRML MDRD: 83 ML/MIN/1.73SQ M
GLUCOSE SERPL-MCNC: 100 MG/DL (ref 65–140)
GLUCOSE UR STRIP-MCNC: NEGATIVE MG/DL
HCT VFR BLD AUTO: 33.7 % (ref 37–47)
HGB BLD-MCNC: 11.2 G/DL (ref 12–16)
HGB UR QL STRIP.AUTO: ABNORMAL
INR PPP: 1.07 (ref 0.86–1.16)
KETONES UR STRIP-MCNC: NEGATIVE MG/DL
LEUKOCYTE ESTERASE UR QL STRIP: NEGATIVE
LYMPHOCYTES # BLD AUTO: 2 THOUSANDS/ΜL (ref 0.6–4.47)
LYMPHOCYTES NFR BLD AUTO: 36 % (ref 14–44)
MAGNESIUM SERPL-MCNC: 1.9 MG/DL (ref 1.6–2.6)
MCH RBC QN AUTO: 30.4 PG (ref 27–31)
MCHC RBC AUTO-ENTMCNC: 33.3 G/DL (ref 31.4–37.4)
MCV RBC AUTO: 91 FL (ref 82–98)
MONOCYTES # BLD AUTO: 0.6 THOUSAND/ΜL (ref 0.17–1.22)
MONOCYTES NFR BLD AUTO: 10 % (ref 4–12)
NEUTROPHILS # BLD AUTO: 2.9 THOUSANDS/ΜL (ref 1.85–7.62)
NEUTS SEG NFR BLD AUTO: 52 % (ref 43–75)
NITRITE UR QL STRIP: NEGATIVE
NON-SQ EPI CELLS URNS QL MICRO: ABNORMAL /HPF
NRBC BLD AUTO-RTO: 0 /100 WBCS
NT-PROBNP SERPL-MCNC: 395 PG/ML
PH UR STRIP.AUTO: 6.5 [PH] (ref 5–9)
PLATELET # BLD AUTO: 251 THOUSANDS/UL (ref 130–400)
PMV BLD AUTO: 7 FL (ref 8.9–12.7)
POTASSIUM SERPL-SCNC: 3.5 MMOL/L (ref 3.5–5.3)
PROT SERPL-MCNC: 7 G/DL (ref 6.4–8.2)
PROT UR STRIP-MCNC: NEGATIVE MG/DL
PROTHROMBIN TIME: 11.2 SECONDS (ref 9.4–11.7)
RBC # BLD AUTO: 3.69 MILLION/UL (ref 4.2–5.4)
RBC #/AREA URNS AUTO: ABNORMAL /HPF
S PYO AG THROAT QL: NEGATIVE
SODIUM SERPL-SCNC: 139 MMOL/L (ref 136–145)
SP GR UR STRIP.AUTO: <=1.005 (ref 1–1.03)
TROPONIN I SERPL-MCNC: <0.02 NG/ML
UROBILINOGEN UR QL STRIP.AUTO: 0.2 E.U./DL
WBC # BLD AUTO: 5.5 THOUSAND/UL (ref 4.8–10.8)
WBC #/AREA URNS AUTO: ABNORMAL /HPF

## 2017-09-14 PROCEDURE — 99285 EMERGENCY DEPT VISIT HI MDM: CPT

## 2017-09-14 PROCEDURE — 81001 URINALYSIS AUTO W/SCOPE: CPT | Performed by: EMERGENCY MEDICINE

## 2017-09-14 PROCEDURE — 87430 STREP A AG IA: CPT | Performed by: EMERGENCY MEDICINE

## 2017-09-14 PROCEDURE — 71010 HB CHEST X-RAY 1 VIEW FRONTAL (PORTABLE): CPT

## 2017-09-14 PROCEDURE — 83880 ASSAY OF NATRIURETIC PEPTIDE: CPT | Performed by: EMERGENCY MEDICINE

## 2017-09-14 PROCEDURE — 93005 ELECTROCARDIOGRAM TRACING: CPT | Performed by: EMERGENCY MEDICINE

## 2017-09-14 PROCEDURE — 85379 FIBRIN DEGRADATION QUANT: CPT | Performed by: EMERGENCY MEDICINE

## 2017-09-14 PROCEDURE — 80053 COMPREHEN METABOLIC PANEL: CPT | Performed by: EMERGENCY MEDICINE

## 2017-09-14 PROCEDURE — 71275 CT ANGIOGRAPHY CHEST: CPT

## 2017-09-14 PROCEDURE — 36415 COLL VENOUS BLD VENIPUNCTURE: CPT | Performed by: EMERGENCY MEDICINE

## 2017-09-14 PROCEDURE — 83735 ASSAY OF MAGNESIUM: CPT | Performed by: EMERGENCY MEDICINE

## 2017-09-14 PROCEDURE — 85025 COMPLETE CBC W/AUTO DIFF WBC: CPT | Performed by: EMERGENCY MEDICINE

## 2017-09-14 PROCEDURE — 87070 CULTURE OTHR SPECIMN AEROBIC: CPT | Performed by: EMERGENCY MEDICINE

## 2017-09-14 PROCEDURE — 85610 PROTHROMBIN TIME: CPT | Performed by: EMERGENCY MEDICINE

## 2017-09-14 PROCEDURE — 84484 ASSAY OF TROPONIN QUANT: CPT | Performed by: EMERGENCY MEDICINE

## 2017-09-14 RX ORDER — ALBUTEROL SULFATE 90 UG/1
1-2 AEROSOL, METERED RESPIRATORY (INHALATION) EVERY 6 HOURS PRN
Qty: 1 INHALER | Refills: 0 | Status: SHIPPED | OUTPATIENT
Start: 2017-09-14

## 2017-09-14 RX ORDER — LEVOFLOXACIN 500 MG/1
500 TABLET, FILM COATED ORAL ONCE
Status: COMPLETED | OUTPATIENT
Start: 2017-09-14 | End: 2017-09-14

## 2017-09-14 RX ORDER — LEVOFLOXACIN 500 MG/1
500 TABLET, FILM COATED ORAL DAILY
Qty: 10 TABLET | Refills: 0 | Status: SHIPPED | OUTPATIENT
Start: 2017-09-14 | End: 2017-09-24

## 2017-09-14 RX ADMIN — LEVOFLOXACIN 500 MG: 500 TABLET, FILM COATED ORAL at 15:30

## 2017-09-14 RX ADMIN — IOHEXOL 85 ML: 350 INJECTION, SOLUTION INTRAVENOUS at 14:10

## 2017-09-15 LAB
ATRIAL RATE: 91 BPM
ATRIAL RATE: 93 BPM
ATRIAL RATE: 94 BPM
P AXIS: 31 DEGREES
P AXIS: 52 DEGREES
P AXIS: 52 DEGREES
PR INTERVAL: 160 MS
PR INTERVAL: 160 MS
PR INTERVAL: 182 MS
QRS AXIS: -2 DEGREES
QRS AXIS: -3 DEGREES
QRS AXIS: 1 DEGREES
QRSD INTERVAL: 84 MS
QRSD INTERVAL: 90 MS
QRSD INTERVAL: 92 MS
QT INTERVAL: 390 MS
QT INTERVAL: 406 MS
QT INTERVAL: 408 MS
QTC INTERVAL: 484 MS
QTC INTERVAL: 501 MS
QTC INTERVAL: 507 MS
T WAVE AXIS: -2 DEGREES
T WAVE AXIS: -5 DEGREES
T WAVE AXIS: -7 DEGREES
VENTRICULAR RATE: 91 BPM
VENTRICULAR RATE: 93 BPM
VENTRICULAR RATE: 94 BPM

## 2017-09-16 LAB — BACTERIA THROAT CULT: NORMAL

## 2017-09-19 ENCOUNTER — GENERIC CONVERSION - ENCOUNTER (OUTPATIENT)
Dept: OTHER | Facility: OTHER | Age: 77
End: 2017-09-19

## 2017-09-20 ENCOUNTER — GENERIC CONVERSION - ENCOUNTER (OUTPATIENT)
Dept: OTHER | Facility: OTHER | Age: 77
End: 2017-09-20

## 2017-09-20 DIAGNOSIS — M75.121 COMPLETE TEAR OF RIGHT ROTATOR CUFF: ICD-10-CM

## 2017-09-20 DIAGNOSIS — M25.511 PAIN IN RIGHT SHOULDER: ICD-10-CM

## 2017-09-20 DIAGNOSIS — Z98.890 OTHER SPECIFIED POSTPROCEDURAL STATES: ICD-10-CM

## 2017-09-21 ENCOUNTER — TRANSCRIBE ORDERS (OUTPATIENT)
Dept: ADMINISTRATIVE | Facility: HOSPITAL | Age: 77
End: 2017-09-21

## 2017-09-21 DIAGNOSIS — C34.11 MALIGNANT NEOPLASM OF UPPER LOBE OF RIGHT LUNG (HCC): Primary | ICD-10-CM

## 2017-09-29 ENCOUNTER — HOSPITAL ENCOUNTER (OUTPATIENT)
Dept: RADIOLOGY | Facility: HOSPITAL | Age: 77
Discharge: HOME | End: 2017-09-29
Payer: MEDICARE

## 2017-09-29 DIAGNOSIS — C34.11 MALIGNANT NEOPLASM OF UPPER LOBE OF RIGHT LUNG (HCC): ICD-10-CM

## 2017-10-25 ENCOUNTER — ALLSCRIPTS OFFICE VISIT (OUTPATIENT)
Dept: OTHER | Facility: OTHER | Age: 77
End: 2017-10-25

## 2017-10-25 ENCOUNTER — GENERIC CONVERSION - ENCOUNTER (OUTPATIENT)
Dept: OTHER | Facility: OTHER | Age: 77
End: 2017-10-25

## 2017-10-25 DIAGNOSIS — M75.121 COMPLETE TEAR OF RIGHT ROTATOR CUFF: ICD-10-CM

## 2017-10-25 DIAGNOSIS — M25.511 PAIN IN RIGHT SHOULDER: ICD-10-CM

## 2017-10-26 ENCOUNTER — GENERIC CONVERSION - ENCOUNTER (OUTPATIENT)
Dept: OTHER | Facility: OTHER | Age: 77
End: 2017-10-26

## 2017-10-30 ENCOUNTER — GENERIC CONVERSION - ENCOUNTER (OUTPATIENT)
Dept: OTHER | Facility: OTHER | Age: 77
End: 2017-10-30

## 2017-11-13 ENCOUNTER — GENERIC CONVERSION - ENCOUNTER (OUTPATIENT)
Dept: OTHER | Facility: OTHER | Age: 77
End: 2017-11-13

## 2017-11-15 ENCOUNTER — GENERIC CONVERSION - ENCOUNTER (OUTPATIENT)
Dept: OTHER | Facility: OTHER | Age: 77
End: 2017-11-15

## 2017-11-17 ENCOUNTER — GENERIC CONVERSION - ENCOUNTER (OUTPATIENT)
Dept: OTHER | Facility: OTHER | Age: 77
End: 2017-11-17

## 2017-11-17 ENCOUNTER — ALLSCRIPTS OFFICE VISIT (OUTPATIENT)
Dept: OTHER | Facility: OTHER | Age: 77
End: 2017-11-17

## 2017-11-24 ENCOUNTER — HOSPITAL ENCOUNTER (OUTPATIENT)
Dept: RADIOLOGY | Facility: HOSPITAL | Age: 77
Discharge: HOME | End: 2017-11-24
Attending: ORTHOPAEDIC SURGERY
Payer: MEDICARE

## 2017-11-24 DIAGNOSIS — M25.511 PAIN IN RIGHT SHOULDER: ICD-10-CM

## 2017-11-24 PROCEDURE — 76705 ECHO EXAM OF ABDOMEN: CPT

## 2017-11-27 ENCOUNTER — GENERIC CONVERSION - ENCOUNTER (OUTPATIENT)
Dept: OTHER | Facility: OTHER | Age: 77
End: 2017-11-27

## 2017-11-27 ENCOUNTER — TRANSCRIBE ORDERS (OUTPATIENT)
Dept: ADMINISTRATIVE | Facility: HOSPITAL | Age: 77
End: 2017-11-27

## 2017-11-27 DIAGNOSIS — R22.1 LOCALIZED SWELLING, MASS OR LUMP OF NECK: ICD-10-CM

## 2017-11-27 DIAGNOSIS — R22.2 MASS IN CHEST: Primary | ICD-10-CM

## 2017-11-27 DIAGNOSIS — M75.101 RIGHT ROTATOR CUFF TEAR: ICD-10-CM

## 2017-11-27 DIAGNOSIS — R22.2 LOCALIZED SWELLING, MASS AND LUMP, TRUNK: ICD-10-CM

## 2017-11-27 DIAGNOSIS — Z98.890 OTHER SPECIFIED POSTPROCEDURAL STATES: ICD-10-CM

## 2017-11-27 DIAGNOSIS — M25.511 PAIN IN RIGHT SHOULDER: ICD-10-CM

## 2017-11-27 DIAGNOSIS — M75.41 IMPINGEMENT SYNDROME OF RIGHT SHOULDER: ICD-10-CM

## 2017-11-27 DIAGNOSIS — M66.321 SPONTANEOUS RUPTURE OF FLEXOR TENDON OF RIGHT UPPER ARM: ICD-10-CM

## 2017-11-29 ENCOUNTER — HOSPITAL ENCOUNTER (OUTPATIENT)
Dept: RADIOLOGY | Facility: HOSPITAL | Age: 77
Discharge: HOME | End: 2017-11-29
Attending: SURGERY
Payer: MEDICARE

## 2017-11-29 DIAGNOSIS — R22.2 LOCALIZED SWELLING, MASS AND LUMP, TRUNK: ICD-10-CM

## 2017-11-29 PROCEDURE — 71260 CT THORAX DX C+: CPT

## 2017-11-29 PROCEDURE — 74177 CT ABD & PELVIS W/CONTRAST: CPT

## 2017-11-29 RX ADMIN — IOHEXOL 100 ML: 350 INJECTION, SOLUTION INTRAVENOUS at 10:02

## 2017-12-01 ENCOUNTER — GENERIC CONVERSION - ENCOUNTER (OUTPATIENT)
Dept: OBGYN CLINIC | Facility: CLINIC | Age: 77
End: 2017-12-01

## 2017-12-06 ENCOUNTER — ALLSCRIPTS OFFICE VISIT (OUTPATIENT)
Dept: OTHER | Facility: OTHER | Age: 77
End: 2017-12-06

## 2017-12-18 ENCOUNTER — ALLSCRIPTS OFFICE VISIT (OUTPATIENT)
Dept: OTHER | Facility: OTHER | Age: 77
End: 2017-12-18

## 2017-12-20 NOTE — CONSULTS
Assessment  1  Mass of right side of neck (784 2) (R22 1)   2  Mass of right lung (786 6) (R91 8)    Plan   IR CONSULT; Status:Need Information - Financial Authorization; Requested for:[id number];  Perform:[facility] Radiology; Order Comments:please evaluate for right supraclavicular node biopsy  Patient is on coumadin; Due:89Xys0401; Ordered; For:Mass of right side of neck; Ordered By:Comfort Maravilla; Results/Data  Bradford Sleepiness Score 13FAD1030 03:27PM User, [facility]     Test Name Result Flag Reference   Bradford Score 12 - Sleepy     Answer Summary: Q1-1, Q2-3, Q3-1, Q4-3, Q5-3, Q6-1, Q7-0, Q8-0     Results Free Text Form [facility]:   Results  Chest X-ray xrays dating back to 2013 reviewed in detail  CT Scan all imaging is reviewed in detail  PFT Results v2:     Spirometry: Forced vital capacity: 1 83L-- and-- 81% Predicted Values  Forced expiratory volume in one second: 1 41L-- and-- 84% Predicted Value  FEV1/FVC ratio is 77% Predicted Values  Post Bronchodilator Spirometry:   Lung Volumes:   DLCO:   PFT Interpretation:  normal spirometry  Discussion/Summary  Discussion Summary:   1  lung mass that is present back in 2016 as well  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  This is likely malignant primary lung  She will need biopsy for definitive diagnosis  She is on coumadin for PE in the past which appears to be have been precipitated by prior surgery  Given the accessibility of nodes in the neck would recommend initial diagnostic work-up with biopsy of that region  IR consult is placed and discussed with IR RN  ALl chest images are reviewed with the patient  She will also require PET/CTFollow-up after biopsy or sooner if needed  All questions are answered to the patients satisfaction and understanding and verbalize understanding  encouraged to call with any questions or concerns  Discussed with Dr Steve Bazan in detail        Active Problems   · Arthritis (486 90) (M1 90)   · 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 (718 01) (M24 111)   · Benign essential hypertension (401 1) (I10)   · Complete tear of right rotator cuff (727 61) (M75 121)   · Fracture of fifth metatarsal bone of right foot (825 25) (S92 351A)   · Gastritis (535 50) (K29 70)   · Hiatal hernia (553 3) (K44 9)   · History of repair of right rotator cuff (V15 29) (Z98 890)   · Left rotator cuff tear (840 4) (M75 102)   · Mass (782 2) (R22 9)   · Mass of right lung (786 6) (R91 8)   · Mass of right side of neck (784 2) (R22 1)   · Neck mass (784 2) (R22 1)   · Right shoulder pain (719 41) (M25 511)   · Right shoulder pain (719 41) (M25 511)   · Shoulder impingement, right (726 2) (M75 41)   · Spontaneous rupture of flexor tendon of right upper arm (727 62) (M66 321)   · Supraclavicular mass (786 6) (R22 2)   · Tear of right rotator cuff, unspecified tear extent (840 4) (M75 101)    Chief Complaint  Chief Complaint Free Text Note Form: Tong Medel is here for Consult per Dr Kathy Altamirano      History of Present Illness  HPI: Patient is a 69 yo female with a past medical history of PE in 2008, 2010- unknown cause of this- on Coumadin, GERD, post nasal drip, HTN, chronic pain, arthritis, diverticulitis who presents for eval of lung mass  She had pain the right supraclavicular region and found a lump there  Not increasing in size but was sent Dr Kathy Altamirano  She was then recommended to undergo CT chest/abd and pelvis and this revealed mass in the right lungShe has a known lung mass that was seen on prior films- patient states that she was not given follow-up for this- on prior scans- supraclavicular adenopathy was not seen (01/2017) also mass has increased in size  she does cough secondary to post nasal drip  No weight loss  Appetite is intact  She did lose weight over the past year due to stressful living situations and health but now she has changed her living situation and her weight is slowly improvingno shortness of breath  non smoker but lifelong exposure of second hand smoke      Review of Systems  Complete-Female - Pulm:  Constitutional: has been gaining weight back, but-- as noted in HPI,-- no fever,-- not feeling poorly,-- no chills-- and-- not feeling tired  Eyes: no purulent discharge from the eyes  ENT: nasal discharge  Cardiovascular: no chest pain,-- no palpitations-- and-- no lower extremity edema  Respiratory: as noted in HPI  Gastrointestinal: no abdominal pain  Genitourinary: no dysuria  Musculoskeletal: arthralgias  Integumentary: no rashes-- and-- no skin lesions  Neurological: no headache-- and-- no confusion  Psychiatric: depression, but-- no anxiety  Endocrine: no muscle weakness  Hematologic/Lymphatic: swollen glands-- and-- swollen glands in the neck  Past Medical History  1  History of Abnormal heart rate (785 3) (R00 9)   2  Arthritis (716 90) (M19 90)   3  History of Chronic sinus complaints (786 9) (R09 89)   4  History of blood clots (V12 51) (Z86 718)   5  History of blood disorder (V12 3) (Z86 2)   6  History of gastroesophageal reflux (GERD) (V12 79) (Z87 19)   7  History of hypertension (V12 59) (Z86 79)  Active Problems And Past Medical History Reviewed: The active problems and past medical history were reviewed and updated today  Surgical History  1  History of Cholecystectomy   2  History of Neck Surgery   3  History of Shoulder Surgery  Surgical History Reviewed: The surgical history was reviewed and updated today  Family History  Family History    1  Family history of Arthritis (716 90) (M19 90)   2  Family history of Cancer (199 1) (C80 1)  Family History Reviewed: The family history was reviewed and updated today  Social History   · Never a smoker   · No alcohol use   · Tea  Social History Reviewed: The social history was reviewed and updated today  The social history was reviewed and is unchanged  Current Meds   1  Acetaminophen 325 MG Oral Tablet;  Therapy: (ZEKFRIYK:45URQ3783) to Recorded   2  Bystolic 10 MG Oral Tablet; Therapy: 60RCT4951 to (Evaluate:33Tby1648) Recorded   3  Bystolic 5 MG Oral Tablet; Therapy: 24JGO1913 to (Evaluate:80Ydy8476) Recorded   4  Colesevelam HCl 625 MG TABS; Therapy: (BEKDVOMA:44VWT7459) to Recorded   5  Ibandronate Sodium 150 MG Oral Tablet; Therapy: (Susen Like) to Recorded   6  Lidocaine 5 % External Ointment; Therapy: (Susen Like) to Recorded   7  Pantoprazole Sodium 40 MG Oral Tablet Delayed Release; Therapy: (ZUKTXDDS:86HRZ6792) to Recorded   8  TraMADol HCl - 50 MG Oral Tablet; Therapy: (Susen Like) to Recorded   9  Warfarin Sodium 1 MG Oral Tablet; Therapy: 22Gfc6134 to (Evaluate:43Kgd4256) Recorded   10  Warfarin Sodium 2 MG Oral Tablet; Therapy: 32GMW1243 to (Evaluate:55Kss1261) Recorded  Medication List Reviewed: The medication list was reviewed and updated today  Allergies  1  Codeine Derivatives   2  cortisone   3  Penicillins    Vitals  Vital Signs    Recorded: 70HBM7684 02:17PM   Temperature 98 3 F, Oral   Heart Rate 84   Pulse Quality Normal   Respiration Quality Normal   Respiration 18   Systolic 742, LUE, Sitting   Diastolic 80, LUE, Sitting   Height 5 ft    Weight 104 lb    BMI Calculated 20 31   BSA Calculated 1 41   O2 Saturation 96, RA     Physical Exam   Constitutional  General appearance: No acute distress, well appearing and well nourished  Ears, Nose, Mouth, and Throat  External inspection of ears and nose: Normal    Oropharynx: Normal with no erythema, edema, exudate or lesions  Mallampati Classification: 2  Neck  Neck: Supple, symmetric, trachea midline, no masses  Pulmonary  Chest: Normal    Respiratory effort: No increased work of breathing or signs of respiratory distress  Auscultation of lungs: Clear to auscultation, no rales, no crackles, no wheezing  Cardiovascular  Auscultation of heart: Normal rate and rhythm, normal S1 and S2, no murmurs     Examination of extremities for edema and/or varicosities: Normal    Abdomen  Abdomen: Soft, non-tender  Lymphatic  Palpation of lymph nodes in neck: Abnormal  -- +palpable adenopathy bilaterally  Musculoskeletal  Gait and station: Normal    Digits and nails: Normal without clubbing or cyanosis  Neurologic  Mental Status: Normal  Not confused, no evidence of dementia, good comprehension, good concentration  Skin  Skin and subcutaneous tissue: Limited exam shows no rash  Psychiatric  Orientation to person, place and time: Normal    Mood and affect: Normal        Future Appointments    Date/Time Provider Specialty Site   01/17/2018 11:00 AM VENUS Garcia   [address] [facility] [employer]     Signatures   Electronically signed by : VENUS Kirk ; Dec 19 2017  3:05PM EST                       (Author)

## 2017-12-21 ENCOUNTER — GENERIC CONVERSION - ENCOUNTER (OUTPATIENT)
Dept: OBGYN CLINIC | Facility: CLINIC | Age: 77
End: 2017-12-21

## 2017-12-29 ENCOUNTER — GENERIC CONVERSION - ENCOUNTER (OUTPATIENT)
Dept: OTHER | Facility: OTHER | Age: 77
End: 2017-12-29

## 2017-12-29 ENCOUNTER — HOSPITAL ENCOUNTER (OUTPATIENT)
Dept: RADIOLOGY | Facility: HOSPITAL | Age: 77
Discharge: HOME | End: 2017-12-29
Attending: INTERNAL MEDICINE
Payer: MEDICARE

## 2017-12-29 DIAGNOSIS — R22.1 MASS OF LATERAL NECK: ICD-10-CM

## 2017-12-29 PROCEDURE — 88305 TISSUE EXAM BY PATHOLOGIST: CPT | Performed by: RADIOLOGY

## 2017-12-29 PROCEDURE — 88341 IMHCHEM/IMCYTCHM EA ADD ANTB: CPT | Performed by: RADIOLOGY

## 2017-12-29 PROCEDURE — 38505 NEEDLE BIOPSY LYMPH NODES: CPT

## 2017-12-29 PROCEDURE — 88342 IMHCHEM/IMCYTCHM 1ST ANTB: CPT | Performed by: RADIOLOGY

## 2017-12-29 PROCEDURE — 76942 ECHO GUIDE FOR BIOPSY: CPT

## 2017-12-29 PROCEDURE — 88333 PATH CONSLTJ SURG CYTO XM 1: CPT | Performed by: RADIOLOGY

## 2017-12-29 PROCEDURE — 88334 PATH CONSLTJ SURG CYTO XM EA: CPT | Performed by: RADIOLOGY

## 2017-12-29 NOTE — DISCHARGE INSTRUCTIONS
Lymph Node Biopsy   WHAT YOU NEED TO KNOW:   A lymph node biopsy is done to remove all or part of a lymph node  A lymph node can be tested for infection, cancer, and other medical conditions  This information may help your healthcare provider decide if you need more tests or treatments  DISCHARGE INSTRUCTIONS:   Seek care immediately if:   Blood soaks through your bandage  · Your bruise suddenly gets larger and feels hard  Contact your healthcare provider if:   · You have a fever or chills  Your wound is red, swollen, or draining    · Your pain does not get better after you take medicine  · You have questions or concerns about your condition or care  Medicines: You may need any of the following:  · NSAIDs , such as ibuprofen, help decrease swelling, pain, and fever  This medicine is available with or without a doctor's order  NSAIDs can cause stomach bleeding or kidney problems in certain people  If you take blood thinner medicine, always ask your healthcare provider if NSAIDs are safe for you  Always read the medicine label and follow directions  · Acetaminophen  decreases pain and fever  It is available without a doctor's order  Ask how much to take and how often to take it  Follow directions  Read the labels of all other medicines you are using to see if they also contain acetaminophen, or ask your doctor or pharmacist  Acetaminophen can cause liver damage if not taken correctly  Do not use more than 4 grams (4,000 milligrams) total of acetaminophen in one day  · Prescription pain medicine  may be given  Ask your healthcare provider how to take this medicine safely  Some prescription pain medicines contain acetaminophen  Do not take other medicines that contain acetaminophen without talking to your healthcare provider  Too much acetaminophen may cause liver damage  Prescription pain medicine may cause constipation  Ask your healthcare provider how to prevent or treat constipation  · Take your medicine as directed  Call your healthcare provider if you think your medicine is not helping or if you have side effects  Tell him if you are allergic to any medicine  Keep a list of the medicines, vitamins, and herbs you take  Include the amounts, and when and why you take them  Bring the list or the pill bottles to follow-up visits  Carry your medicine list with you in case of an emergency  Care for your wound as directed:  Ask your healthcare provider when your biopsy site can get wet  Carefully wash around the biopsy site with soap and water  It is okay to let soap and water gently run over your biopsy site  Do not  scrub your biopsy site  You may remove the bandage in 24 hours  Check your biopsy site every day for signs of infection, such as redness, swelling, or pus  Do not put powders or lotions on your biopsy site      Self-care:   Apply ice  on your biopsy site for 15 to 20 minutes every hour or as directed  Use an ice pack, or put crushed ice in a plastic bag  Cover it with a towel before you apply it to your skin  Ice helps prevent tissue damage and decreases swelling        Do not do strenuous activities  for 24 to 48 hours  Strenuous activities include heavy lifting, sports, or running  Follow up with your healthcare provider as directed: You may need more tests  Write down your questions so you remember to ask them during your visits    Felecia Rubin may contact the Interventional RN for any questions at [[phone] til 4pm After 4pm you may contact your ordering MD

## 2018-01-08 ENCOUNTER — GENERIC CONVERSION - ENCOUNTER (OUTPATIENT)
Dept: OTHER | Facility: OTHER | Age: 78
End: 2018-01-08

## 2018-01-08 ENCOUNTER — ALLSCRIPTS OFFICE VISIT (OUTPATIENT)
Dept: OTHER | Facility: OTHER | Age: 78
End: 2018-01-08

## 2018-01-08 ENCOUNTER — TRANSCRIBE ORDERS (OUTPATIENT)
Dept: ADMINISTRATIVE | Facility: HOSPITAL | Age: 78
End: 2018-01-08

## 2018-01-08 DIAGNOSIS — C34.92 SQUAMOUS CARCINOMA OF LUNG, LEFT (HCC): Primary | ICD-10-CM

## 2018-01-08 DIAGNOSIS — C34.92 MALIGNANT NEOPLASM OF UNSPECIFIED PART OF LEFT BRONCHUS OR LUNG (HCC): ICD-10-CM

## 2018-01-10 ENCOUNTER — GENERIC CONVERSION - ENCOUNTER (OUTPATIENT)
Dept: OTHER | Facility: OTHER | Age: 78
End: 2018-01-10

## 2018-01-11 ENCOUNTER — ALLSCRIPTS OFFICE VISIT (OUTPATIENT)
Dept: OTHER | Facility: OTHER | Age: 78
End: 2018-01-11

## 2018-01-11 ENCOUNTER — GENERIC CONVERSION - ENCOUNTER (OUTPATIENT)
Dept: OTHER | Facility: OTHER | Age: 78
End: 2018-01-11

## 2018-01-12 VITALS
BODY MASS INDEX: 19.44 KG/M2 | WEIGHT: 99 LBS | HEIGHT: 60 IN | DIASTOLIC BLOOD PRESSURE: 77 MMHG | HEART RATE: 73 BPM | SYSTOLIC BLOOD PRESSURE: 159 MMHG

## 2018-01-12 VITALS
DIASTOLIC BLOOD PRESSURE: 71 MMHG | HEIGHT: 60 IN | SYSTOLIC BLOOD PRESSURE: 152 MMHG | HEART RATE: 77 BPM | BODY MASS INDEX: 18.9 KG/M2 | WEIGHT: 96.25 LBS

## 2018-01-13 VITALS
BODY MASS INDEX: 20.62 KG/M2 | HEIGHT: 60 IN | DIASTOLIC BLOOD PRESSURE: 77 MMHG | WEIGHT: 105 LBS | SYSTOLIC BLOOD PRESSURE: 176 MMHG | HEART RATE: 91 BPM

## 2018-01-15 ENCOUNTER — GENERIC CONVERSION - ENCOUNTER (OUTPATIENT)
Dept: OTHER | Facility: OTHER | Age: 78
End: 2018-01-15

## 2018-01-17 NOTE — PROCEDURES
Procedures by ABBY Calix at 2016  2:58 PM      Author:  ABBY Calix Service:  (none) Author Type:  Speech and Language Pathologist     Filed:  2016  3:12 PM Date of Service:  2016  2:58 PM Status:  Signed     :  ABBY Calix (Speech and Language Pathologist)                                               Video Fluoroscopic Swallow Study      Patient Name: Lyla Hemphill  Today's Date: 2016  par  par  Past Medical History  Past Medical History     Diagnosis  Date    Atrial fibrillation     Diverticulitis     GERD (gastroesophageal reflux disease)     Hypertension     Osteoporosis     Pulmonary embolism         Past Surgical History  Past Surgical History      Procedure  Laterality Date    Rotator cuff repair      Hysterectomy      Cholecystectomy      Neck surgery       General Information:  Ordering Physician: Dr Bill Cassidyil Grade  Radiologist: Dr Gabby Cohen  Date of Order: 16  Date of Evaluation: 16  Type of Study: Initial MBS  Diet Prior to this Study: Regular diet and thin liquids  Past Medical History: See medical chart  Additional History: Patient with c/o coughing with foods/ fluids  Positionin degrees in the lateral plane  Materials Administered: Puree, soft/ solid food, and thin liquid    Oral Stage:  Within Functional Limits for all textures  Pharyngeal Stage:  Within Functional Limits all textures  Swallow Mechanics  Swallow Initiation was: Prompt  Hyolaryngeal Excursion was: Adequate  Epiglottic Inversion was: Present  Tongue Drive was: Adequate  Pharyngeal Constriction was: Adequate  Cricopharyngeal Opening/ Relaxation was: Adequate  Cervical Osteophytes: Small one noted, with no impact on transit of food through the pharynx     Esophageal Stage:  Within functional limits  No back flow or stasis evident in the cervical/ proximal esophagus       Impressions:  Oral pharyngeal swallow skills are [facility] regular solids and thin/ all liquids  There was no laryngeal penetration or aspiration of foods or thin liquid during or following all swallows  No residuals present in the pharynx or cervical esophagus  following all swallows of foods/fluids  A small osteophyte was noted  Recommendations:  Regular Diet and Thin Liquids  Liquid Administration: Straw or cup  Medication Administration: Medication whole with thin liquid  Suggested Postioning: Upright/ 90 degrees  Strategies:  Alternate food/ liquid intakes  Consider Follow-up VBS: PRN                           Received for:Provider  EPIC   Jun 29 2016  3:11PM [facility] Standard Time

## 2018-01-19 ENCOUNTER — HOSPITAL ENCOUNTER (OUTPATIENT)
Dept: RADIOLOGY | Age: 78
Discharge: HOME | End: 2018-01-19
Payer: MEDICARE

## 2018-01-19 ENCOUNTER — GENERIC CONVERSION - ENCOUNTER (OUTPATIENT)
Dept: OTHER | Facility: OTHER | Age: 78
End: 2018-01-19

## 2018-01-19 VITALS — BODY MASS INDEX: 20.27 KG/M2 | WEIGHT: 103.8 LBS

## 2018-01-19 DIAGNOSIS — C34.92 SQUAMOUS CARCINOMA OF LUNG, LEFT (HCC): ICD-10-CM

## 2018-01-19 LAB — GLUCOSE SERPL-MCNC: 108 MG/DL (ref 65–140)

## 2018-01-19 PROCEDURE — 78815 PET IMAGE W/CT SKULL-THIGH: CPT

## 2018-01-19 PROCEDURE — A9552 F18 FDG: HCPCS

## 2018-01-19 PROCEDURE — 82948 REAGENT STRIP/BLOOD GLUCOSE: CPT

## 2018-01-19 RX ADMIN — IOHEXOL 5 ML: 240 INJECTION, SOLUTION INTRATHECAL; INTRAVASCULAR; INTRAVENOUS; ORAL at 09:15

## 2018-01-22 VITALS
HEIGHT: 60 IN | WEIGHT: 99.13 LBS | DIASTOLIC BLOOD PRESSURE: 73 MMHG | HEART RATE: 79 BPM | BODY MASS INDEX: 19.46 KG/M2 | SYSTOLIC BLOOD PRESSURE: 148 MMHG

## 2018-01-22 VITALS
WEIGHT: 101.5 LBS | SYSTOLIC BLOOD PRESSURE: 132 MMHG | RESPIRATION RATE: 18 BRPM | BODY MASS INDEX: 19.93 KG/M2 | HEIGHT: 60 IN | DIASTOLIC BLOOD PRESSURE: 78 MMHG | TEMPERATURE: 96.9 F

## 2018-01-22 VITALS
SYSTOLIC BLOOD PRESSURE: 166 MMHG | HEART RATE: 83 BPM | BODY MASS INDEX: 19.24 KG/M2 | WEIGHT: 98 LBS | DIASTOLIC BLOOD PRESSURE: 79 MMHG | HEIGHT: 60 IN

## 2018-01-22 VITALS — HEIGHT: 60 IN | WEIGHT: 101.38 LBS | BODY MASS INDEX: 19.91 KG/M2

## 2018-01-23 VITALS
BODY MASS INDEX: 21.08 KG/M2 | HEIGHT: 60 IN | OXYGEN SATURATION: 96 % | RESPIRATION RATE: 16 BRPM | TEMPERATURE: 97.7 F | DIASTOLIC BLOOD PRESSURE: 86 MMHG | HEART RATE: 102 BPM | WEIGHT: 107.38 LBS | SYSTOLIC BLOOD PRESSURE: 152 MMHG

## 2018-01-23 VITALS
BODY MASS INDEX: 20.42 KG/M2 | TEMPERATURE: 98.3 F | OXYGEN SATURATION: 96 % | DIASTOLIC BLOOD PRESSURE: 80 MMHG | WEIGHT: 104 LBS | SYSTOLIC BLOOD PRESSURE: 130 MMHG | HEIGHT: 60 IN | HEART RATE: 84 BPM | RESPIRATION RATE: 18 BRPM

## 2018-01-23 NOTE — CONSULTS
I had the pleasure of evaluating your patient, Simabill Amor  My full evaluation follows:      Chief Complaint  New patient, new diagnosis of bronchogenic carcinoma      History of Present Illness  51-year-old female referred for the above  Mrs Baez had right shoulder rotator cuff repair in September 2017  Postoperatively, during the physical therapy phase, patient was found to have a right supraclavicular mass  Patient has been seen by surgery and pulmonary  Recent biopsy demonstrated adenocarcinoma  Additional workup demonstrated a right upper lobe mass  Presently Mrs Baez dates feeling okay, baseline  No shortness of breath or dyspnea on exertion  No chest pain or pressure  No cough, sputum or hemoptysis  Appetite is good, patient is actually gained weight recently  No fevers, chills or sweats  No headaches, syncopal episodes or body aches  No other GI,  or GYN issues  Review of Systems    Constitutional: as noted in HPI  Eyes: No complaints of eye pain, no red eyes, no eyesight problems, no discharge, no dry eyes, no itching of eyes  ENT: no complaints of earache, no loss of hearing, no nose bleeds, no nasal discharge, no sore throat, no hoarseness  Cardiovascular: No complaints of slow heart rate, no fast heart rate, no chest pain, no palpitations, no leg claudication, no lower extremity edema  Respiratory: as noted in HPI  Gastrointestinal: No complaints of abdominal pain, no constipation, no nausea or vomiting, no diarrhea, no bloody stools  Genitourinary: No complaints of dysuria, no incontinence, no pelvic pain, no dysmenorrhea, no vaginal discharge or bleeding  Musculoskeletal: No complaints of arthralgias, no myalgias, no joint swelling or stiffness, no limb pain or swelling  Integumentary: No complaints of skin rash or lesions, no itching, no skin wounds, no breast pain or lump     Neurological: No complaints of headache, no confusion, no convulsions, no numbness, no dizziness or fainting, no tingling, no limb weakness, no difficulty walking  Psychiatric: Not suicidal, no sleep disturbance, no anxiety or depression, no change in personality, no emotional problems  Endocrine: No complaints of proptosis, no hot flashes, no muscle weakness, no deepening of the voice, no feelings of weakness  Hematologic/Lymphatic: No complaints of swollen glands, no swollen glands in the neck, does not bleed easily, does not bruise easily  Active Problems    1  Adenocarcinoma of left lung (162 9) (C34 92)   2  Arthritis (716 90) (M19 90)   3  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 (718 01) (M24 111)   4  Benign essential hypertension (401 1) (I10)   5  Complete tear of right rotator cuff (727 61) (M75 121)   6  Cough (786 2) (R05)   7  Fracture of fifth metatarsal bone of right foot (825 25) (S92 351A)   8  Gastritis (535 50) (K29 70)   9  Hiatal hernia (553 3) (K44 9)   10  History of repair of right rotator cuff (V15 29) (Z98 890)   11  Left rotator cuff tear (840 4) (M75 102)   12  Mass (782 2) (R22 9)   13  Mass of right lung (786 6) (R91 8)   14  Mass of right side of neck (784 2) (R22 1)   15  Neck mass (784 2) (R22 1)   16  Right shoulder pain (719 41) (M25 511)   17  Right shoulder pain (719 41) (M25 511)   18  Shoulder impingement, right (726 2) (M75 41)   19  Spontaneous rupture of flexor tendon of right upper arm (727 62) (M66 321)   20  Supraclavicular mass (786 6) (R22 2)   21   Tear of right rotator cuff, unspecified tear extent (840 4) (M75 101)    Past Medical History    · History of Abnormal heart rate (785 3) (R00 9)   · Arthritis (716 90) (M19 90)   · History of Chronic sinus complaints (786 9) (R09 89)   · History of blood clots (V12 51) (Z86 718)   · History of blood disorder (V12 3) (Z86 2)   · History of gastroesophageal reflux (GERD) (V12 79) (Z87 19)   · History of hypertension (V12 59) (Z86 79)    The active problems and past medical history were reviewed and updated today  As above, patient has had a PE twice and is on anticoagulation - no known thrombophilia, hypertension, reflux disease, hiatal hernia, normal childhood illnesses and vaccinations  Obese/GYN: Patient with a distant history of persistent vaginal bleeding status post hysterectomy with ovary removed, last mammogram was approximately 18 months ago - patient could not give a specific answer as to why she was not up-to-date with the mammogram        Surgical History    · History of Cholecystectomy   · History of Neck Surgery   · History of Shoulder Surgery    The surgical history was reviewed and updated today  Right rotator cuff surgery, right foot fracture, cholecystectomy, although the specifics are not clear, patient was transfused twice in the past because of "anemia"        Family History    · No pertinent family history    · Family history of Arthritis (716 90) (M19 90)   · Family history of Cancer (199 1) (C80 1)   · Family history of arthritis (V17 7) (Z82 61)   · Family history of cardiac disorder (V17 49) (Z82 49)   · Family history of diabetes mellitus (V18 0) (Z83 3)   · Family history of hypertension (V17 49) (Z82 49)    The family history was reviewed and updated today  2 sons alive and well, no known familial or genetic diseases        Social History    · Exposure to secondhand smoke (V15 89) (Z77 22)   · Never a smoker   · Never a smoker   · No alcohol use   · Retired   · Tea   ·  (V61 07) (Z63 4)  The social history was reviewed and updated today  , no tobacco use but  smoked cigarettes his whole adult life in the home, patient's son and daughter-in-law also smoked, no alcohol or drug abuse, patient worked in the [employer] for 27 years        Current Meds   1  Acetaminophen 325 MG Oral Tablet; Therapy: (QWDUIUKA:36NAW2499) to Recorded   2  Bystolic 10 MG Oral Tablet; Therapy: 10XSF3142 to (Evaluate:04Rbe5010) Recorded   3   Bystolic 5 MG Oral Tablet; Therapy: 58HHF3778 to (Evaluate:42Fcv1672) Recorded   4  Colesevelam HCl 625 MG TABS; Therapy: (RAKQZP:53YBP9075) to Recorded   5  Ibandronate Sodium 150 MG Oral Tablet; Therapy: (Jefe Floras) to Recorded   6  Lidocaine 5 % External Ointment; Therapy: (Jefe Floras) to Recorded   7  Pantoprazole Sodium 40 MG Oral Tablet Delayed Release; Therapy: ([facility]ZUnited Health Services:66CES4671) to Recorded   8  TraMADol HCl - 50 MG Oral Tablet; Therapy: (Jefe Floras) to Recorded   9  Warfarin Sodium 1 MG Oral Tablet; Therapy: 70Tfx1121 to (Evaluate:57Jna0935) Recorded   10  Warfarin Sodium 2 MG Oral Tablet; Therapy: 38BTS9945 to (Evaluate:05Gcq8213) Recorded    Allergies    1  Codeine Derivatives   2  cortisone   3  Penicillins    Vitals   Recorded: 75HEJ7672 01:18PM   Temperature 97 7 F   Heart Rate 102   Respiration 16   Systolic 614   Diastolic 86   Height 5 ft    Weight 107 lb 6 oz   BMI Calculated 20 97   BSA Calculated 1 43   O2 Saturation 96   Pain Scale 0     Physical Exam    Constitutional   General appearance: No acute distress, well appearing and well nourished  Thin female, no respiratory distress  Eyes   Conjunctiva and lids: No swelling, erythema or discharge  Pupils and irises: Equal, round and reactive to light  Ears, Nose, Mouth, and Throat   External inspection of ears and nose: Normal     Nasal mucosa, septum, and turbinates: Normal without edema or erythema  Moist, pink, upper and lower dentures, no exudate  Pulmonary Decreased breath sounds in right upper lung field, few scattered rhonchi  Cardiovascular   Palpation of heart: Normal PMI, no thrills  Auscultation of heart: Normal rate and rhythm, normal S1 and S2, without murmurs  Examination of extremities for edema and/or varicosities: Normal     Carotid pulses: Normal     Abdomen   Abdomen: Non-tender, no masses  Liver and spleen: No hepatomegaly or splenomegaly      Lymphatic   Palpation of lymph nodes in neck: Abnormal   + Right supra-clavicular adenopathy, no cervical adenopathy bilaterally, no axillary or inguinal adenopathy bilaterally  Musculoskeletal   Gait and station: Normal     Digits and nails: Normal without clubbing or cyanosis  Inspection/palpation of joints, bones, and muscles: Normal     Skin   Skin and subcutaneous tissue: Normal without rashes or lesions  Neurologic   Cranial nerves: Cranial nerves 2-12 intact  Reflexes: 2+ and symmetric  Sensation: No sensory loss  Psychiatric   Orientation to person, place, and time: Normal     Mood and affect: Normal          Results/Data    Results   Pathology 12/29/17 right supraclavicular lymph node core biopsy demonstrated metastatic adenocarcinoma compatible with a pulmonary origin  Immune stains demonstrated the lesion to be positive for TTF-1 and CK 7  Radiology 11/29/17 CAT scan of the chest and abdomen/pelvis demonstrated a right upper lobe spiculated mass, 2 3 x 1 9 cm  Patient may have postobstructive pneumonitis  Patient had mild pulmonary emphysema and biapical pleural parenchymal scarring  Right hilar adenopathy was present  Patient had bilateral supra-clavicular adenopathy  There were no findings suggestive of metastatic disease in the abdomen or pelvis  No destructive osseous lesions were commented on  Lab Results 8/31/17 WBC = 6 1 hemoglobin = 12 2 hematocrit = 36 6 platelet = 328  Assessment    1  Adenocarcinoma of left lung (162 9) (C34 92)    Plan  Adenocarcinoma of left lung    · Follow-up visit in 3 weeks Evaluation and Treatment  Follow-up  Status: Hold For -  Scheduling  Requested for: 80OHG6803   Ordered; For: Adenocarcinoma of left lung; Ordered By: Anabella Hollingsworth Performed:  Due: 81XDC7850    Discussion/Summary    42-year-old female recently diagnosed with right upper lobe bronchogenic carcinoma  Patient never smoked but was exposed to secondhand smoke and worked in a blasting factory   [facility] stage at this moment is T1b N3 M0 = IIIB  Performance at this time is quite good  We discussed lung cancer in general and specific treatment options for stage IIIB disease  Mrs Baez stated that she is absolutely opposed to chemotherapy (apparently patient's daughter-in-law  of metastatic breast cancer and had significant side effects/toxicities with the chemotherapy)  Patient is willing to consider immunotherapy or possibly "a pill" if she has a genetic mutation  EGFR, ALK, ROS1 and PDL 1 expression will be requested  Results will dictate further treatment options  We also discussed the fact that the patient's tumor may not be amendable to treatments with "a pill" or with immunotherapy  Mrs Baez understood that without treatment, her cancer would grow and that eventually the cancer/complication will eventually kill her  Patient understands that chemotherapy (+/- radiotherapy) may not cure her but could increase her life span  Patient does not believe that the chemotherapy would improve her quality of life since her quality of life at this time is baseline anyway  I have not ordered the PET/CT at this time  I do not believe there is any need for the PET/CT if patient should decide that she does not want any form of treatment  Mrs Baez is to return in 3 weeks, earlier if the genetic test results are available  Patient knows to call if she has any other oncology questions or concerns  Carefully review your medication list and verify that the list is accurate and up-to-date  Please call the hematology/oncology office if there are medications missing from the list, medications on the list that you are not currently taking or if there is a dosage or instruction that is different from how you're taking a medication  Thank you very much for allowing me to participate in the care of this patient  If you have any questions, please do not hesitate to contact me        Signatures   Electronically signed by : Yovana Tian MD; Jan 8 2018  2:33PM EST                       (Author)

## 2018-01-23 NOTE — MISCELLANEOUS
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 (718 01) (M24 111)   4  Benign essential hypertension (401 1) (I10)   5  Complete tear of right rotator cuff (727 61) (M75 121)   6  Cough (786 2) (R05)   7  Fracture of fifth metatarsal bone of right foot (825 25) (S92 351A)   8  Gastritis (535 50) (K29 70)   9  Hiatal hernia (553 3) (K44 9)   10  History of repair of right rotator cuff (V15 29) (Z98 890)   11  Left rotator cuff tear (840 4) (M75 102)   12  Mass (782 2) (R22 9)   13  Mass of right lung (786 6) (R91 8)   14  Mass of right side of neck (784 2) (R22 1)   15  Neck mass (784 2) (R22 1)   16  Right shoulder pain (719 41) (M25 511)   17  Right shoulder pain (719 41) (M25 511)   18  Shoulder impingement, right (726 2) (M75 41)   19  Spontaneous rupture of flexor tendon of right upper arm (727 62) (M66 321)   20  Supraclavicular mass (786 6) (R22 2)   21  Tear of right rotator cuff, unspecified tear extent (840 4) (M75 101)    Current Meds   1  Acetaminophen 325 MG Oral Tablet; Therapy: (ZMXDHXXS:50UBU5783) to Recorded   2  Bystolic 10 MG Oral Tablet; Therapy: 85MTQ0660 to (Evaluate:52Fnc4199) Recorded   3  Bystolic 5 MG Oral Tablet; Therapy: 56WSM2768 to (Evaluate:70Ira1493) Recorded   4  Colesevelam HCl 625 MG TABS; Therapy: (XIAFXGVY:05HUN3734) to Recorded   5  Ibandronate Sodium 150 MG Oral Tablet; Therapy: (Haydee Sanchez) to Recorded   6  Lidocaine 5 % External Ointment; Therapy: (Haydee Sanchez) to Recorded   7  Pantoprazole Sodium 40 MG Oral Tablet Delayed Release; Therapy: (NEBFGQDO:35WRJ5339) to Recorded   8   TraMADol HCl - 50 MG Oral Tablet; Therapy: (Iraj Chol) to Recorded   9  Warfarin Sodium 1 MG Oral Tablet; Therapy: 82Rtp5818 to (Evaluate:11Nyu1657) Recorded   10  Warfarin Sodium 2 MG Oral Tablet; Therapy: 72RPY0027 to (Evaluate:79Ear6385) Recorded    Allergies    1  Codeine Derivatives   2  cortisone   3   Penicillins    Signatures   Electronically signed by : Claudia aLgos, ; Solitario 10 2018  5:05PM EST                       (Author)

## 2018-01-24 ENCOUNTER — HOSPITAL ENCOUNTER (OUTPATIENT)
Facility: HOSPITAL | Age: 78
Setting detail: OUTPATIENT SURGERY
Discharge: HOME | End: 2018-01-24
Attending: SURGERY | Admitting: SURGERY
Payer: MEDICARE

## 2018-01-24 ENCOUNTER — ANESTHESIA (OUTPATIENT)
Dept: PERIOP | Facility: HOSPITAL | Age: 78
End: 2018-01-24
Payer: MEDICARE

## 2018-01-24 ENCOUNTER — ANESTHESIA EVENT (OUTPATIENT)
Dept: PERIOP | Facility: HOSPITAL | Age: 78
End: 2018-01-24
Payer: MEDICARE

## 2018-01-24 ENCOUNTER — APPOINTMENT (OUTPATIENT)
Dept: RADIOLOGY | Facility: HOSPITAL | Age: 78
End: 2018-01-24
Payer: MEDICARE

## 2018-01-24 VITALS
WEIGHT: 105 LBS | RESPIRATION RATE: 16 BRPM | HEART RATE: 80 BPM | TEMPERATURE: 98.2 F | HEIGHT: 60 IN | SYSTOLIC BLOOD PRESSURE: 124 MMHG | BODY MASS INDEX: 20.62 KG/M2 | DIASTOLIC BLOOD PRESSURE: 68 MMHG

## 2018-01-24 VITALS
TEMPERATURE: 97.7 F | SYSTOLIC BLOOD PRESSURE: 166 MMHG | DIASTOLIC BLOOD PRESSURE: 74 MMHG | HEART RATE: 84 BPM | WEIGHT: 103 LBS | OXYGEN SATURATION: 97 % | BODY MASS INDEX: 20.12 KG/M2 | RESPIRATION RATE: 18 BRPM

## 2018-01-24 VITALS
DIASTOLIC BLOOD PRESSURE: 72 MMHG | HEIGHT: 60 IN | RESPIRATION RATE: 18 BRPM | OXYGEN SATURATION: 98 % | TEMPERATURE: 97.9 F | SYSTOLIC BLOOD PRESSURE: 136 MMHG | HEART RATE: 90 BPM

## 2018-01-24 VITALS — WEIGHT: 104.13 LBS | HEIGHT: 60 IN | BODY MASS INDEX: 20.44 KG/M2

## 2018-01-24 VITALS
HEIGHT: 60 IN | OXYGEN SATURATION: 98 % | BODY MASS INDEX: 20.85 KG/M2 | DIASTOLIC BLOOD PRESSURE: 82 MMHG | WEIGHT: 106.19 LBS | SYSTOLIC BLOOD PRESSURE: 122 MMHG | HEART RATE: 69 BPM | TEMPERATURE: 98 F | RESPIRATION RATE: 16 BRPM

## 2018-01-24 DIAGNOSIS — R91.1 NODULE OF RIGHT LUNG: Primary | ICD-10-CM

## 2018-01-24 DIAGNOSIS — R91.8 OTHER NONSPECIFIC ABNORMAL FINDING OF LUNG FIELD (CODE): ICD-10-CM

## 2018-01-24 DIAGNOSIS — R22.1 LOCALIZED SWELLING, MASS OR LUMP OF NECK: ICD-10-CM

## 2018-01-24 PROCEDURE — 88333 PATH CONSLTJ SURG CYTO XM 1: CPT | Performed by: SURGERY

## 2018-01-24 PROCEDURE — 38500 BIOPSY/REMOVAL LYMPH NODES: CPT | Performed by: PATHOLOGY

## 2018-01-24 PROCEDURE — 88341 IMHCHEM/IMCYTCHM EA ADD ANTB: CPT | Performed by: SURGERY

## 2018-01-24 PROCEDURE — 88342 IMHCHEM/IMCYTCHM 1ST ANTB: CPT | Performed by: PATHOLOGY

## 2018-01-24 PROCEDURE — 88333 PATH CONSLTJ SURG CYTO XM 1: CPT | Performed by: PATHOLOGY

## 2018-01-24 PROCEDURE — 88341 IMHCHEM/IMCYTCHM EA ADD ANTB: CPT | Performed by: PATHOLOGY

## 2018-01-24 PROCEDURE — 88342 IMHCHEM/IMCYTCHM 1ST ANTB: CPT | Performed by: SURGERY

## 2018-01-24 PROCEDURE — 71045 X-RAY EXAM CHEST 1 VIEW: CPT

## 2018-01-24 PROCEDURE — 93005 ELECTROCARDIOGRAM TRACING: CPT

## 2018-01-24 PROCEDURE — 88363 XM ARCHIVE TISSUE MOLEC ANAL: CPT | Performed by: PATHOLOGY

## 2018-01-24 RX ORDER — SODIUM CHLORIDE 9 MG/ML
75 INJECTION, SOLUTION INTRAVENOUS CONTINUOUS
Status: DISCONTINUED | OUTPATIENT
Start: 2018-01-24 | End: 2018-01-24 | Stop reason: HOSPADM

## 2018-01-24 RX ORDER — OXYCODONE HYDROCHLORIDE 5 MG/1
5 TABLET ORAL EVERY 4 HOURS PRN
Qty: 30 TABLET | Refills: 0 | Status: SHIPPED | OUTPATIENT
Start: 2018-01-24 | End: 2018-02-03

## 2018-01-24 RX ORDER — METOPROLOL TARTRATE 5 MG/5ML
INJECTION INTRAVENOUS AS NEEDED
Status: DISCONTINUED | OUTPATIENT
Start: 2018-01-24 | End: 2018-01-24 | Stop reason: SURG

## 2018-01-24 RX ORDER — MAGNESIUM HYDROXIDE 1200 MG/15ML
LIQUID ORAL AS NEEDED
Status: DISCONTINUED | OUTPATIENT
Start: 2018-01-24 | End: 2018-01-24 | Stop reason: HOSPADM

## 2018-01-24 RX ORDER — ONDANSETRON 2 MG/ML
4 INJECTION INTRAMUSCULAR; INTRAVENOUS ONCE AS NEEDED
Status: DISCONTINUED | OUTPATIENT
Start: 2018-01-24 | End: 2018-01-24 | Stop reason: HOSPADM

## 2018-01-24 RX ORDER — BUPIVACAINE HYDROCHLORIDE AND EPINEPHRINE 5; 5 MG/ML; UG/ML
INJECTION, SOLUTION PERINEURAL AS NEEDED
Status: DISCONTINUED | OUTPATIENT
Start: 2018-01-24 | End: 2018-01-24 | Stop reason: HOSPADM

## 2018-01-24 RX ORDER — PROPOFOL 10 MG/ML
INJECTION, EMULSION INTRAVENOUS AS NEEDED
Status: DISCONTINUED | OUTPATIENT
Start: 2018-01-24 | End: 2018-01-24 | Stop reason: SURG

## 2018-01-24 RX ORDER — IPRATROPIUM BROMIDE AND ALBUTEROL SULFATE 2.5; .5 MG/3ML; MG/3ML
3 SOLUTION RESPIRATORY (INHALATION)
Status: DISCONTINUED | OUTPATIENT
Start: 2018-01-24 | End: 2018-01-24 | Stop reason: HOSPADM

## 2018-01-24 RX ORDER — HEPARIN SODIUM 5000 [USP'U]/ML
INJECTION, SOLUTION INTRAVENOUS; SUBCUTANEOUS AS NEEDED
Status: DISCONTINUED | OUTPATIENT
Start: 2018-01-24 | End: 2018-01-24 | Stop reason: SURG

## 2018-01-24 RX ORDER — FENTANYL CITRATE/PF 50 MCG/ML
25 SYRINGE (ML) INJECTION AS NEEDED
Status: DISCONTINUED | OUTPATIENT
Start: 2018-01-24 | End: 2018-01-24 | Stop reason: HOSPADM

## 2018-01-24 RX ORDER — LIDOCAINE HYDROCHLORIDE 10 MG/ML
INJECTION, SOLUTION INFILTRATION; PERINEURAL AS NEEDED
Status: DISCONTINUED | OUTPATIENT
Start: 2018-01-24 | End: 2018-01-24 | Stop reason: SURG

## 2018-01-24 RX ORDER — PROPOFOL 10 MG/ML
INJECTION, EMULSION INTRAVENOUS CONTINUOUS PRN
Status: DISCONTINUED | OUTPATIENT
Start: 2018-01-24 | End: 2018-01-24 | Stop reason: SURG

## 2018-01-24 RX ORDER — FENTANYL CITRATE 50 UG/ML
INJECTION, SOLUTION INTRAMUSCULAR; INTRAVENOUS AS NEEDED
Status: DISCONTINUED | OUTPATIENT
Start: 2018-01-24 | End: 2018-01-24 | Stop reason: SURG

## 2018-01-24 RX ORDER — IPRATROPIUM BROMIDE AND ALBUTEROL SULFATE 2.5; .5 MG/3ML; MG/3ML
3 SOLUTION RESPIRATORY (INHALATION)
Status: DISCONTINUED | OUTPATIENT
Start: 2018-01-24 | End: 2018-01-24

## 2018-01-24 RX ORDER — SODIUM CHLORIDE 9 MG/ML
125 INJECTION, SOLUTION INTRAVENOUS CONTINUOUS
Status: DISCONTINUED | OUTPATIENT
Start: 2018-01-24 | End: 2018-01-24 | Stop reason: HOSPADM

## 2018-01-24 RX ADMIN — PROPOFOL 80 MG: 10 INJECTION, EMULSION INTRAVENOUS at 09:32

## 2018-01-24 RX ADMIN — FENTANYL CITRATE 25 MCG: 50 INJECTION, SOLUTION INTRAMUSCULAR; INTRAVENOUS at 10:28

## 2018-01-24 RX ADMIN — CEFAZOLIN SODIUM 2000 MG: 2 SOLUTION INTRAVENOUS at 09:35

## 2018-01-24 RX ADMIN — FENTANYL CITRATE 50 MCG: 50 INJECTION, SOLUTION INTRAMUSCULAR; INTRAVENOUS at 09:32

## 2018-01-24 RX ADMIN — METOPROLOL TARTRATE 2.5 MG: 5 INJECTION INTRAVENOUS at 10:06

## 2018-01-24 RX ADMIN — IPRATROPIUM BROMIDE AND ALBUTEROL SULFATE 3 ML: .5; 3 SOLUTION RESPIRATORY (INHALATION) at 12:20

## 2018-01-24 RX ADMIN — LIDOCAINE HYDROCHLORIDE 40 MG: 10 INJECTION, SOLUTION INFILTRATION; PERINEURAL at 09:32

## 2018-01-24 RX ADMIN — HEPARIN SODIUM 5000 UNITS: 5000 INJECTION, SOLUTION INTRAVENOUS; SUBCUTANEOUS at 09:50

## 2018-01-24 RX ADMIN — PROPOFOL 150 MCG/KG/MIN: 10 INJECTION, EMULSION INTRAVENOUS at 09:32

## 2018-01-24 RX ADMIN — FENTANYL CITRATE 25 MCG: 50 INJECTION, SOLUTION INTRAMUSCULAR; INTRAVENOUS at 10:09

## 2018-01-24 RX ADMIN — SODIUM CHLORIDE: 0.9 INJECTION, SOLUTION INTRAVENOUS at 09:25

## 2018-01-24 NOTE — PERIOPERATIVE NURSING NOTE
11:20 Rc'd pt from PACU  Resting comfortably in bed  Po fluids served and tolerated  Call bell with in reach  11:55 Assisted Pt to BR  Voided and BM  12:00 Pt assisted out of BR to bed  Observed pt very SOB, wheezing to stridor, c/o 5/10 right shoulder pain  SAO2 100% on RA   2 L o2 placed on Pt  Andrew Junior at bedside to assess pt, orders as written  12:10  Chest xray and EKG done at bedside  12:20 duoneb done as ordered  Dr Gonzalez at bedside  Pt c/o right shoulder pain 7/10     12:35 Dr Sara Brenner at bedside to assess pt       13:00 pt states right shoulder pain better and tolerable,  resp easy, no distress observed      13:35  Report to Christopher Nicole rn

## 2018-01-24 NOTE — PROGRESS NOTES
Called to the bedside after Mr Tinajero FRANCISCO Jimenes was called to evaluate the patient for acute shortness of breath I had reviewed the chest x-ray and saw that there was no pneumothorax  We started with DuoNeb as all the wheezing that she had had on physical examination seemed to be all upper airway and on listening to the lungs she was otherwise clear  I just spoke to Venkata Hodges, hospitalist, who was also gracious to come and examine my patient and we are currently weaning her off oxygen  EKG did not show anything overt such as STEMI  At this point if she is able to ambulate and she is able to be off oxygen, I do not see that she has any significant risk of any other process going on  Granted, she has a history of bilateral PE, but she was given the heparin 5000 international units subcutaneously prior to the surgery and the SCDs in place  Should she continue to progress, there would be no reason to admit her nor scan her chest at this point

## 2018-01-24 NOTE — DISCHARGE INSTRUCTIONS
1  Please keep all wounds clean and dry  You may shower  Please do not immerse wounds in tub or pool for 2 weeks  2  Wash all wounds with mild soap and water  There is not a need for any dressings  3  Please call for any fever greater than 101 5 degrees fahrenheit, redness or drainage from the wound, purulence (pus), malodor (bad odor), or if wound comes apart  4  Please call with any other concerns: [phone]   5  Please call for follow up, make an appointment for 2 weeks post-discharge [phone]

## 2018-01-24 NOTE — ANESTHESIA PREPROCEDURE EVALUATION
Anxiety   Angular stomatitis   Chest pain   History of pulmonary embolism      Complete tear of right rotator cuff     Hypertension    Atrial fibrillation (HCC)    GERD (gastroesophageal reflux disease)    Pulmonary embolism (HCC) x2 episodes-on warfarin   Diverticulitis    Osteoporosis    Tachycardia    Wears glasses for reading   Fracture, foot right foot-no surgical intervention   Full dentures    Arthritis    History of anemia transfused with 2 units-unknown etiology   Borderline diabetes watches diet   Sciatica    Hearing aid worn both ears   History of transfusion 2 units   Hearing decreased, 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 [address]  [address] [facility], [place] [address]  [phone]     Transthoracic Echocardiogram  2D, M-mode, Doppler, and Color Doppler     Study date:  2017     Patient: Manuel Sullivan  MR number: [id number]  Account number: [de-identified]  : 1940  Age: 68 years  Gender: Female  Status: Routine  Location: Bedside  Height: 60 in  Weight: 99 9 lb  BP: 94/ 50 mmHg     Indications: CAD     Diagnoses: I25 83 - Coronary atherosclerosis due to lipid rich plaque     Sonographer:  Jamil Mojica  Primary Physician:  Eloy Martinez MD  Group:  Rodrigue Buenrostro  Interpreting Physician:  Toni Garcia MD     SUMMARY     LEFT VENTRICLE:  Systolic function was normal  Ejection fraction was estimated in the range of 60 % to 65 % to be 65 %    Although no diagnostic regional wall motion abnormality was identified, this possibility cannot be completely excluded on the basis of this study  Wall thickness was at the upper limits of normal   Doppler parameters were consistent with abnormal left ventricular relaxation (grade 1 diastolic dysfunction)      MITRAL VALVE:  There was mild annular calcification  There was mild regurgitation      TRICUSPID VALVE:  There was mild regurgitation  Estimated peak PA pressure was 30 mmHg      HISTORY: PRIOR HISTORY: HTN, GERD     PROCEDURE: The procedure was performed at the bedside  This was a routine study  The transthoracic approach was used  The study included complete 2D imaging, M-mode, complete spectral Doppler, and color Doppler  The heart rate was 64 bpm,  at the start of the study  Image quality was adequate      LEFT VENTRICLE: Size was normal  Systolic function was normal  Ejection fraction was estimated in the range of 60 % to 65 % to be 65 %  Although no diagnostic regional wall motion abnormality was identified, this possibility cannot be  completely excluded on the basis of this study  Wall thickness was at the upper limits of normal  DOPPLER: Doppler parameters were consistent with abnormal left ventricular relaxation (grade 1 diastolic dysfunction)      RIGHT VENTRICLE: The size was normal  Systolic function was normal      LEFT ATRIUM: Size was normal      RIGHT ATRIUM: Size was normal      MITRAL VALVE: There was mild annular calcification  DOPPLER: There was no evidence for stenosis  There was mild regurgitation      AORTIC VALVE: The valve was probably trileaflet  Leaflets exhibited mildly increased thickness  DOPPLER: There was no evidence for stenosis  There was no regurgitation      TRICUSPID VALVE: DOPPLER: There was mild regurgitation  Estimated peak PA pressure was 30 mmHg      PULMONIC VALVE: DOPPLER: There was trace regurgitation      PERICARDIUM: There was no thickening or calcification   There was no pericardial effusion      AORTA: The root exhibited normal size      SYSTEMIC VEINS: IVC: The inferior vena cava was normal in size  Respirophasic changes were normal      SYSTEM MEASUREMENT TABLES     2D mode  AoR Diam 2D: 2 7 cm  LA Diam (2D): 3 2 cm  LA/Ao (2D): 1 19  FS (2D Teich): 35 4 %  IVSd (2D): 1 03 cm  LVDEV: 45 1 cm³  LVESV: 15 3 cm³  LVIDd(2D): 3 33 cm  LVISd (2D): 2 15 cm  LVPWd (2D): 0 88 cm  SV (Teich): 29 8 cm³     Apical four chamber  LVEF A4C: 63 %     Unspecified Scan Mode  MV Peak A Luis: 611 mm/s  MV Peak E Luis  Mean: 736 mm/s  MVA (PHT): 3 86 cm squared  PHT: 57 ms  Max P mm(Hg)  V Max: 2530 mm/s  Vmax: 2440 mm/s  RA Area: 7 9 cm squared  RA Volume: 11 8 cm³  TAPSE: 1 9 cm     [facility] Accredited Echocardiography Laboratory     Prepared and electronically signed by  Merna Wynn MD  Signed 2017 17:20:53       NM myocardial perfusion spect (stress and/or rest)   Status: Final result  PACS Images     Show images for NM myocardial perfusion spect (stress and/or rest)  Order Report      Order Details   Study Result     [address] [place] [address] [phone]     Rest/Stress Gated SPECT Myocardial Perfusion Imaging After Regadenoson     Patient: Debbie Coles  MR number: [id number]  Account number: [de-identified]  : 1940  Age: 68 years  Gender: Female  Status: Outpatient  Location: Stress lab  Height: 60 in  Weight: 100 lb  BP: 133/ 70 mmHg     Allergies: CORTISONE, CODEINE, ASPIRIN     Diagnosis: 401 9 - HYPERTENSION NOS, R07 9 - Chest pain, unspecified     Primary Physician:  Asa Patricio MD  RN:  MAVIS Meraz  Group:  Panda Ray  Report Prepared By[de-identified]  MAVIS Meraz  Interpreting Physician:  Indiana Resendiz MD     INDICATIONS: Detection of coronary artery disease      HISTORY: The patient is a 68year old  female  Chest pain status: chest pain  Coronary artery disease risk factors: hypertension  Cardiovascular history: arrhythmia-AFib   Co-morbidity: history of lung disease-Pulmonary Emboli  Medications: Warfarin, a beta blocker and a lipid lowering agent      PHYSICAL EXAM: Baseline physical exam screening: no wheezes audible      REST ECG: Normal sinus rhythm      PROCEDURE: The study was performed in the stress lab  A regadenoson infusion pharmacologic stress test was performed  Gated SPECT myocardial perfusion imaging was performed after stress and at rest  Systolic blood pressure was 133 mmHg, at  the start of the study  Diastolic blood pressure was 70 mmHg, at the start of the study  The heart rate was 67 bpm, at the start of the study  IV double checked  Regadenoson protocol:  Time HR bpm SBP mmHg DBP mmHg Symptoms Rhythm/conduct  Baseline 13:53 73 133 70 none NSR  Immediate 13:56 107 141 83 moderate dyspnea, flushing --  1 min 13:57 113 139 83 same as above --  2 min 13:58 113 146 72 subsiding --  3 min 13:59 109 150 90 subsiding --  4 min 14:00 104 155 78 none --  No medications or fluids given      STRESS SUMMARY: Duration of pharmacologic stress was 4 min  Maximal heart rate during stress was 113 bpm  The heart rate response to stress was normal  There was normal resting blood pressure with an appropriate response to stress  The  rate-pressure product for the peak heart rate and blood pressure was 69402  There was no chest pain during stress  The stress test was terminated due to protocol completion  Pre oxygen saturation: 98 %  Peak oxygen saturation: 98 %  There  were no stress arrhythmias or conduction abnormalities      ISOTOPE ADMINISTRATION:  The radiopharmaceutical was injected at the peak effect of pharmacologic stress      MYOCARDIAL PERFUSION IMAGING:  The image quality was good  Left ventricular size was normal      PERFUSION DEFECTS:  -  There were no perfusion defects      GATED SPECT:  The calculated left ventricular ejection fraction was 70 %  Left ventricular ejection fraction was within normal limits by visual estimate   There was no left ventricular regional abnormality      SUMMARY:  -  Stress results: There was no chest pain during stress  -  Perfusion imaging: There were no perfusion defects   -  Gated SPECT: The calculated left ventricular ejection fraction was 70 %  Left ventricular ejection fraction was within normal limits by visual estimate  There was no left ventricular regional abnormality      IMPRESSIONS: Normal study after pharmacologic vasodilation  Myocardial perfusion imaging was normal at rest and with stress  Left ventricular systolic function was normal      Prepared and signed by     Caleb Bell MD  Signed 01/18/2017 16:59:38    , PE,  Pulmonary    Comment: Nodule of right lung     GI/Hepatic    GERD ,             Endo/Other  Diabetes ,      GYN       Hematology   Musculoskeletal  Sciatica,   Arthritis     Neurology    Neuromuscular disease ,    Psychology   Anxiety,          full discussion with surgeon and patient about risks of pneumothorax  ETT with low tidal volumes and 0 PEEP while biopsy an dissection is taking place    Physical Exam    Airway    Mallampati score: II  TM Distance: >3 FB  Neck ROM: full     Dental   upper dentures and lower dentures,     Cardiovascular  Rhythm: regular, Rate: normal,     Pulmonary  Breath sounds clear to auscultation,     Other Findings        Anesthesia Plan  ASA Score- 3     Anesthesia Type- IV sedation with anesthesia and general with ASA Monitors  Additional Monitors:   Airway Plan:         Plan Factors-    Induction- intravenous  Postoperative Plan- Plan for postoperative opioid use  Informed Consent- Anesthetic plan and risks discussed with patient  I personally reviewed this patient with the CRNA  Discussed and agreed on the Anesthesia Plan with the CRNA  Jennifer Grissom

## 2018-01-24 NOTE — ANESTHESIA POSTPROCEDURE EVALUATION
Post-Op Assessment Note      CV Status:  Stable    Mental Status:  Alert and awake    Hydration Status:  Euvolemic    PONV Controlled:  Controlled    Airway Patency:  Patent    Post Op Vitals Reviewed: Yes          Staff: Anesthesiologist           BP (P) 136/79 (01/24/18 1031)    Temp (P) 97 7 °F (36 5 °C) (01/24/18 1031)    Pulse (P) 94 (01/24/18 1031)   Resp (P) 16 (01/24/18 1031)    SpO2 (P) 100 % (01/24/18 1031)

## 2018-01-24 NOTE — OP NOTE
OPERATIVE REPORT  PATIENT NAME: Curtis Corona    :  1940  MRN: [id number]  Pt Location: [facility] OR ROOM 03    SURGERY DATE: 2018    Surgeon(s) and Role:     * Carlee Arrington MD - Primary    Preop Diagnosis:  Other nonspecific abnormal finding of lung field (CODE) [R91 8]  Localized swelling, mass or lump of neck [R22 1]    Post-Op Diagnosis Codes:     * Other nonspecific abnormal finding of lung field (CODE) [R91 8]     * Localized swelling, mass or lump of neck [R22 1]    Procedure(s) (LRB):  EXCISIONAL BIOPSY SUPRACLAVICULAR LYMPH NODE (Right)    Specimen(s):  ID Type Source Tests Collected by Time Destination   1 : RT supraclavicular lymph node Tissue Lymph Node TISSUE EXAM, LEUKEMIA/LYMPHOMA FLOW CYTOMETRY Carlee Arrington MD 2018 1002        Estimated Blood Loss:   Minimal    Drains:  None     Anesthesia Type:   Local    Operative Indications: Other nonspecific abnormal finding of lung field (CODE) [R91 8]  Localized swelling, mass or lump of neck [R22 1]    Operative Findings:  Enlarged right supraclavicular node    Complications:   None    Post-Op CXR: Negative for pneumothorax    Procedure and Technique:  Zulay Kenney was brought into the operating room, anesthetized, prepped, and draped with a shoulder roll and the head tilted to the left similar to a carotid endarterectomy positioning  A brief time-out was then held to ensure that this was [facility], date of birth 1940 and that she had taken her beta-blockers this morning and also had had a history of bilateral PEs and therefore was given 5000 units of subcutaneous heparin prior to the operation  She was not noted to be on any therapeutic anticoagulation as she had stopped her Coumadin several days prior  ASA was noted to be 3 and fire safety level was noted to be 4  We started off with injecting the area immediately over the palpable supraclavicular node with 0 5% Marcaine with epinephrine    I then made a 3 cm incision over the most palpable region and subsequently divided part of the platysmus and approached the supraclavicular node  I was then able to dissect around it gently and preserved all vasculature that was surrounding it  Having done this then I grabbed the node with an Allis clamp gently and subsequently dissected around it as much as possible to ill I encounter the most matted area and subsequently divided the node from its attachments and got a total of 1 5 cm x 1 5 cm of nodular tissue  After this I was able to clip any areas of the lacteals that seemed to be leaking any lymphatic tissue and subsequently cauterized the surrounding areas and noted good hemostasis  After this I then irrigated out the area very well and subsequently closed the deeper tissues using 3-0 Vicryl suture to close the subcutaneous layer  I then closed the skin using a 4-0 Monocryl suture for subcuticular closure and used skin glue to seal the skin  The patient tolerated the procedure well and was taken to PACU for further care and management  I was present for the entire case      Patient Disposition:  Stable to PACU    SIGNATURE: Olvin Quinones MD  DATE: January 24, 2018  TIME: 12:29 PM

## 2018-01-25 ENCOUNTER — HOSPITAL ENCOUNTER (EMERGENCY)
Facility: HOSPITAL | Age: 78
Discharge: HOME | End: 2018-01-25
Attending: EMERGENCY MEDICINE | Admitting: EMERGENCY MEDICINE
Payer: MEDICARE

## 2018-01-25 ENCOUNTER — APPOINTMENT (EMERGENCY)
Dept: RADIOLOGY | Facility: HOSPITAL | Age: 78
End: 2018-01-25
Payer: MEDICARE

## 2018-01-25 VITALS
TEMPERATURE: 97.6 F | DIASTOLIC BLOOD PRESSURE: 78 MMHG | RESPIRATION RATE: 18 BRPM | BODY MASS INDEX: 19.53 KG/M2 | WEIGHT: 100 LBS | HEART RATE: 87 BPM | OXYGEN SATURATION: 97 % | SYSTOLIC BLOOD PRESSURE: 109 MMHG

## 2018-01-25 DIAGNOSIS — L30.9 DERMATITIS: Primary | ICD-10-CM

## 2018-01-25 LAB
ATRIAL RATE: 250 BPM
QRS AXIS: 34 DEGREES
QRSD INTERVAL: 76 MS
QT INTERVAL: 374 MS
QTC INTERVAL: 452 MS
T WAVE AXIS: 30 DEGREES
VENTRICULAR RATE: 88 BPM

## 2018-01-25 PROCEDURE — 93010 ELECTROCARDIOGRAM REPORT: CPT | Performed by: INTERNAL MEDICINE

## 2018-01-25 PROCEDURE — 73060 X-RAY EXAM OF HUMERUS: CPT

## 2018-01-25 PROCEDURE — 99283 EMERGENCY DEPT VISIT LOW MDM: CPT

## 2018-01-26 NOTE — ED PROVIDER NOTES
History  Chief Complaint   Patient presents with    Rash     pt presents to the ed c/o rash and itching  pt pointed to a left deltoid bruise and states her incision near the right clavical   pt states that she was seen by dr Miguel Angel Pendleton for lumpectomy      66-year-old female presents to the ER for possible irritation in her skin around the surgical incision site  Patient states that she had a lump removed from her right clavicular region yesterday  Patient noted some mild redness around the incision site today that was itchy in appearance  Patient came to the ER for further evaluation  By the time patient arrived to the ER the redness to her skin significantly improved  Patient incidentally also notes mild bruising to lateral aspect of her proximal left humerus  Patient cannot recall any trauma  Patient is on Coumadin currently however she was off of her Coumadin over the past week for her procedure yesterday  History provided by:  Patient  Rash   Associated symptoms: no abdominal pain, no diarrhea, no fever, no headaches, no joint pain, no nausea, no shortness of breath and not wheezing        Prior to Admission Medications   Prescriptions Last Dose Informant Patient Reported? Taking? Calcium Carb-Cholecalciferol (CALCIUM 600-D PO)   Yes No   Sig: Take by mouth Monday thru Friday   Cholecalciferol (VITAMIN D PO)   Yes No   Sig: Take 3,000 Units by mouth Monday thru Friday   GLUCOSAMINE SULFATE PO   Yes No   Sig: Take 600 mg by mouth 2 (two) times a day   Lidocaine (LIDODERM EX)   Yes No   Sig: Apply topically 2 (two) times a day Apply cream to right shoulder and arm   acetaminophen (TYLENOL) 325 mg tablet   No No   Sig: Take 2 tablets (650 mg total) by mouth every 6 (six) hours as needed for mild pain, headaches or fever     albuterol (PROVENTIL HFA,VENTOLIN HFA) 90 mcg/act inhaler   No No   Sig: Inhale 1-2 puffs every 6 (six) hours as needed for wheezing   colesevelam (WELCHOL) 625 mg tablet   Yes No Sig: Take 1,875 mg by mouth 2 (two) times a day with meals     nebivolol (BYSTOLIC) 10 mg tablet   Yes No   Sig: Take 10 mg by mouth every morning     nebivolol (BYSTOLIC) 5 mg tablet   Yes No   Sig: Take 5 mg by mouth daily with lunch     oxyCODONE (ROXICODONE) 5 mg immediate release tablet   No No   Sig: Take 1 tablet by mouth every 4 (four) hours as needed for moderate pain for up to 10 days Max Daily Amount: 30 mg   oxyCODONE (ROXICODONE) 5 mg immediate release tablet   No No   Sig: Take 1 tablet by mouth every 4 (four) hours as needed for moderate pain for up to 10 days Max Daily Amount: 30 mg   pantoprazole (PROTONIX) 40 mg tablet   Yes No   Sig: Take 40 mg by mouth every morning     risedronate (ACTONEL) 150 MG tablet   Yes No   Sig: Take 150 mg by mouth every 30 (thirty) days with water on empty stomach, nothing by mouth or lie down for next 30 minutes     warfarin (COUMADIN) 3 mg tablet  Self Yes No   Sig: Take 3 mg by mouth daily at bedtime Last dose 1/18/18       Facility-Administered Medications: None       Past Medical History:   Diagnosis Date    Arthritis     Atrial fibrillation ([address] )     Borderline diabetes     watches diet    Diverticulitis     Fracture, foot 12/30/2016    right foot-no surgical intervention    Full dentures     GERD (gastroesophageal reflux disease)     Hearing aid worn     both ears    Hearing decreased, bilateral     has hearing    History of anemia 2016    transfused with 2 units-unknown etiology    History of transfusion 2016    2 units    Hypertension     Osteoporosis     Pulmonary embolism (HCC)     x2 episodes-on warfarin    Sciatica     Tachycardia     Wears glasses     for reading       Past Surgical History:   Procedure Laterality Date    CARPAL TUNNEL RELEASE Right     CATARACT EXTRACTION Bilateral     CHOLECYSTECTOMY      open    COLONOSCOPY      EGD AND COLONOSCOPY N/A 7/1/2016    Procedure: EGD AND COLONOSCOPY;  Surgeon: Shantell Pelayo MD; Location: [place] [address] [facility]; Service:     HYSTERECTOMY      one ovary remains    LYMPH NODE BIOPSY Right 1/24/2018    Procedure: EXCISIONAL BIOPSY SUPRACLAVICULAR LYMPH NODE;  Surgeon: Queen Ivette MD;  Location: [address] [place] [address];  Service: General    NECK SURGERY      bone spur with bone transplant  2nd surgery "pinched nerve"    MT SHLDR ARTHROSCOP,SURG,W/ROTAT CUFF REPR Right 9/11/2017    Procedure: SHOULDER ARTHROSCOPY WITH ROTATOR CUFF REPAIR, BICEPS TENOTOMY AND SUBACROMIAL DECOMPRESSION;  Surgeon: Siomara Elizabeth MD;  Location: [place] [address] MAIN OR;  Service: Orthopedics    ROTATOR CUFF REPAIR Left 01/08/2015       Family History   Problem Relation Age of Onset    Diabetes Mother     Heart attack Mother     Heart attack Father     Diabetes Sister      insulin dependent    Heart disease Sister      CABG    Diabetes Brother      insulin dependent    Heart disease Brother      CABG     I have reviewed and agree with the history as documented  Social History   Substance Use Topics    Smoking status: Never Smoker    Smokeless tobacco: Never Used    Alcohol use No        Review of Systems   Constitutional: Negative for activity change, appetite change, chills and fever  HENT: Negative for congestion and ear pain  Eyes: Negative for pain and discharge  Respiratory: Negative for cough, chest tightness, shortness of breath, wheezing and stridor  Cardiovascular: Negative for chest pain and palpitations  Gastrointestinal: Negative for abdominal distention, abdominal pain, constipation, diarrhea and nausea  Endocrine: Negative for cold intolerance  Genitourinary: Negative for dysuria, frequency and urgency  Musculoskeletal: Negative for arthralgias and back pain  Skin: Positive for rash  Negative for color change  Allergic/Immunologic: Negative for environmental allergies and food allergies  Neurological: Negative for dizziness, weakness, numbness and headaches     Hematological: Negative for adenopathy  Psychiatric/Behavioral: Negative for agitation, behavioral problems and confusion  The patient is not nervous/anxious  All other systems reviewed and are negative  Physical Exam  ED Triage Vitals [01/25/18 1954]   Temperature Pulse Respirations Blood Pressure SpO2   97 6 °F (36 4 °C) 87 18 109/78 97 %      Temp Source Heart Rate Source Patient Position - Orthostatic VS BP Location FiO2 (%)   Tympanic Monitor Lying Right arm --      Pain Score       --           Orthostatic Vital Signs  Vitals:    01/25/18 1954   BP: 109/78   Pulse: 87   Patient Position - Orthostatic VS: Lying       Physical Exam   Constitutional: She is oriented to person, place, and time  She appears well-developed and well-nourished  HENT:   Head: Normocephalic and atraumatic  Mouth/Throat: Oropharynx is clear and moist    Eyes: Conjunctivae and EOM are normal    Neck: Normal range of motion  Neck supple  Cardiovascular: Normal rate, regular rhythm, normal heart sounds and intact distal pulses  Pulmonary/Chest: Effort normal and breath sounds normal    Abdominal: Soft  Bowel sounds are normal  She exhibits no distension  There is no tenderness  Musculoskeletal: Normal range of motion  Neurological: She is alert and oriented to person, place, and time  Skin: Skin is warm and dry  2 cm surgical incision site noted near the right medial clavicle that appears to be healing well  No discharge noted  Mild surrounding erythema noted around incision site  No edema, or warm to touch noted to the region  2 cm x 2 cm area of ecchymosis noted to the lateral aspect of left proximal humerus  Area is mildly tender  Is range of motion of shoulders fully intact  Psychiatric: She has a normal mood and affect  Her behavior is normal  Judgment and thought content normal    Nursing note and vitals reviewed        ED Medications  Medications - No data to display    Diagnostic Studies  Results Reviewed None                 XR humerus LEFT   Final Result by Yamini Church MD (01/25 2132)      No acute osseous abnormality  Workstation performed: [id number]                    Procedures  Procedures       Phone Contacts  ED Phone Contact    ED Course  ED Course                                MDM  Number of Diagnoses or Management Options  Dermatitis: new and requires workup  Diagnosis management comments: Obtain x-ray of left humerus to rule out any acute fracture  Amount and/or Complexity of Data Reviewed  Tests in the radiology section of CPT®: ordered and reviewed  Tests in the medicine section of CPT®: ordered and reviewed    Risk of Complications, Morbidity, and/or Mortality  General comments: No acute fractures noted on x-ray  Patient's rash around the surgical incision site significantly improved by the time patient arrived to the ER  Incision scar appears to be very well-appearing in the ER  At this time patient's symptoms may be secondary to localized skin reaction  Patient discharged home on p r n  Benadryl cream for further episode of dermatitis  Patient to follow up with her surgeon and PCP in 3-4 days  Patient agrees with discharge plan  Patient well appearing at time of discharge    Patient Progress  Patient progress: improved    CritCare Time    Disposition  Final diagnoses:   Dermatitis     Time reflects when diagnosis was documented in both MDM as applicable and the Disposition within this note     Time User Action Codes Description Comment    1/25/2018  8:55 PM Jeri Morejon Add [L30 9] Dermatitis       ED Disposition     ED Disposition Condition Comment    Discharge  Donel Martyr discharge to home/self care  Condition at discharge: Good        Follow-up Information     Follow up With Specialties Details Why Dirk Salinas MD Internal Medicine In 1 week  [address] Jordyn Stevens MD General Surgery In 1 week  [address], [facility]  [phone]          Discharge Medication List as of 1/25/2018  8:56 PM      CONTINUE these medications which have NOT CHANGED    Details   acetaminophen (TYLENOL) 325 mg tablet Take 2 tablets (650 mg total) by mouth every 6 (six) hours as needed for mild pain, headaches or fever , Starting 7/2/2016, Until Discontinued, Print      albuterol (PROVENTIL HFA,VENTOLIN HFA) 90 mcg/act inhaler Inhale 1-2 puffs every 6 (six) hours as needed for wheezing, Starting u 9/14/2017, Print      Calcium Carb-Cholecalciferol (CALCIUM 600-D PO) Take by mouth Monday thru Friday, Historical Med      Cholecalciferol (VITAMIN D PO) Take 3,000 Units by mouth Monday thru Friday, Historical Med      colesevelam (WELCHOL) 625 mg tablet Take 1,875 mg by mouth 2 (two) times a day with meals  , Historical Med      GLUCOSAMINE SULFATE PO Take 600 mg by mouth 2 (two) times a day, Historical Med      Lidocaine (LIDODERM EX) Apply topically 2 (two) times a day Apply cream to right shoulder and arm, Historical Med      !! nebivolol (BYSTOLIC) 10 mg tablet Take 10 mg by mouth every morning  , Until Discontinued, Historical Med      !! nebivolol (BYSTOLIC) 5 mg tablet Take 5 mg by mouth daily with lunch  , Until Discontinued, Historical Med      !! oxyCODONE (ROXICODONE) 5 mg immediate release tablet Take 1 tablet by mouth every 4 (four) hours as needed for moderate pain for up to 10 days Max Daily Amount: 30 mg, Starting Wed 1/24/2018, Until Sat 2/3/2018, Print      !! oxyCODONE (ROXICODONE) 5 mg immediate release tablet Take 1 tablet by mouth every 4 (four) hours as needed for moderate pain for up to 10 days Max Daily Amount: 30 mg, Starting Wed 1/24/2018, Until Sat 2/3/2018, Print      pantoprazole (PROTONIX) 40 mg tablet Take 40 mg by mouth every morning  , Historical Med      risedronate (ACTONEL) 150 MG tablet Take 150 mg by mouth every 30 (thirty) days with water on empty stomach, nothing by mouth or lie down for next 30 minutes  , Historical Med      warfarin (COUMADIN) 3 mg tablet Take 3 mg by mouth daily at bedtime Last dose 1/18/18 , Historical Med       !! - Potential duplicate medications found  Please discuss with provider  No discharge procedures on file      ED Provider  Electronically Signed by           Medardo Huang DO  01/25/18 2865

## 2018-01-26 NOTE — DISCHARGE INSTRUCTIONS
Please take a list of all of your medications and discharge paperwork with you to all of your follow-up medical visits  Please take all of your medications as directed  You can use over-the-counter Benadryl cream as needed around your incision site for any itching  Please follow up with Dr Abiodun Sin and your family doctor as directed  Please call your family doctor or return to the ER if you have increased shortness of breath, chest pain, fevers, chills, nausea, vomiting, diarrhea, or any other worsening symptoms  Dermatitis   WHAT YOU NEED TO KNOW:   Dermatitis is skin inflammation  You may have an itchy rash, redness, or swelling  You may also have bumps or blisters that crust over or ooze clear fluid  Dermatitis can be caused by allergens such as dust mites, pet dander, pollen, and certain foods  It can also develop when something touches your skin and irritates it or causes an allergic reaction  Examples include soaps, chemicals, latex, and poison ivy  DISCHARGE INSTRUCTIONS:   Call 911 if you have any of the following symptoms of anaphylaxis:   · Sudden trouble breathing    · Throat swelling and tightness    · Dizziness, lightheadedness, fainting, or confusion  Return to the emergency department if:   · You develop a fever or have red streaks going up your arm or leg  · Your rash gets more swollen, red, or hot  Contact your healthcare provider if:   · Your skin blisters, oozes white or yellow pus, or has a foul-smelling discharge  · Your rash spreads or does not get better, even after treatment  · You have questions or concerns about your condition or care  Medicines:   · Medicines  help decrease itching and inflammation, or treat a bacterial infection  They may be given as a topical cream, shot, or a pill  · Take your medicine as directed  Contact your healthcare provider if you think your medicine is not helping or if you have side effects   Tell him of her if you are allergic to any medicine  Keep a list of the medicines, vitamins, and herbs you take  Include the amounts, and when and why you take them  Bring the list or the pill bottles to follow-up visits  Carry your medicine list with you in case of an emergency  Apply a cool compress to your rash: This will help soothe your skin  Keep your skin moist:  Rub unscented cream or lotion on your skin to prevent dryness and itching  Do this right after a lukewarm bath or shower when your skin is still damp  Avoid skin irritants:  Do not use skin irritants, such as makeup, hair products, soaps, and cleansers  Use products that do not contain fragrances or dye  Follow up with your healthcare provider as directed:  Write down your questions so you remember to ask them during your visits  © 2017 [address] [employer] Information is for End User's use only and may not be sold, redistributed or otherwise used for commercial purposes  All illustrations and images included in CareNotes® are the copyrighted property of [employer]  or [employer] Her[employer]  The above information is an  only  It is not intended as medical advice for individual conditions or treatments  Talk to your doctor, nurse or pharmacist before following any medical regimen to see if it is safe and effective for you

## 2018-02-08 ENCOUNTER — OFFICE VISIT (OUTPATIENT)
Dept: HEMATOLOGY ONCOLOGY | Facility: MEDICAL CENTER | Age: 78
End: 2018-02-08
Payer: MEDICARE

## 2018-02-08 ENCOUNTER — OFFICE VISIT (OUTPATIENT)
Dept: SURGERY | Facility: CLINIC | Age: 78
End: 2018-02-08

## 2018-02-08 VITALS
OXYGEN SATURATION: 98 % | HEIGHT: 60 IN | WEIGHT: 105.4 LBS | DIASTOLIC BLOOD PRESSURE: 86 MMHG | TEMPERATURE: 97.4 F | BODY MASS INDEX: 20.69 KG/M2 | HEART RATE: 73 BPM | RESPIRATION RATE: 16 BRPM | SYSTOLIC BLOOD PRESSURE: 138 MMHG

## 2018-02-08 VITALS
HEART RATE: 81 BPM | BODY MASS INDEX: 20.42 KG/M2 | SYSTOLIC BLOOD PRESSURE: 132 MMHG | DIASTOLIC BLOOD PRESSURE: 76 MMHG | TEMPERATURE: 97.4 F | RESPIRATION RATE: 16 BRPM | WEIGHT: 104 LBS | HEIGHT: 60 IN

## 2018-02-08 DIAGNOSIS — Z09 POSTOP CHECK: ICD-10-CM

## 2018-02-08 DIAGNOSIS — C34.91 MALIGNANT NEOPLASM OF RIGHT LUNG, UNSPECIFIED PART OF LUNG (HCC): Primary | ICD-10-CM

## 2018-02-08 DIAGNOSIS — Z98.890 S/P LYMPH NODE BIOPSY: ICD-10-CM

## 2018-02-08 PROBLEM — C34.92 NON-SMALL CELL CARCINOMA OF LEFT LUNG (HCC): Status: ACTIVE | Noted: 2018-02-08

## 2018-02-08 PROCEDURE — 99214 OFFICE O/P EST MOD 30 MIN: CPT | Performed by: INTERNAL MEDICINE

## 2018-02-08 PROCEDURE — 99024 POSTOP FOLLOW-UP VISIT: CPT | Performed by: SURGERY

## 2018-02-08 RX ORDER — LIDOCAINE 50 MG/G
OINTMENT TOPICAL
COMMUNITY

## 2018-02-08 NOTE — PROGRESS NOTES
Jose Maria Sosa  1940  [address] [facility]  [address] [place] [id number]    DISCUSSION  SUMMARY:    42-year-old female recently diagnosed with right upper lobe bronchogenic carcinoma  PET/CT demonstrated M1/stage IV disease  Patient never smoked but was exposed to secondhand smoke and worked in a blasting factory  Issues:      1  Non-small cell lung carcinoma  As above, patient feels relatively well from a lung cancer standpoint  We discussed at length the PET/CT findings and the fact that patient has metastatic/usually incurable disease  As discussed previously, Mrs Aj Rivera does not wish to be treated with chemotherapy or radiotherapy (patient's daughter-in-law apparently had a very difficult time with chemotherapy while being treated for [facility])  Patient would consider oral treatment or a PDL 1 antibody  EGFR, ALK, ROS1 and PDL 1 expression results are pending  Patient will follow-up with Dr Bruce Spence (status post right supraclavicular lymph node biopsy)  Patient was given a copy of FIVE WISHES - she will discuss this with her family at home  2  Right upper extremity weakness/pain  Patient underwent right shoulder surgery last fall  Mrs Baez missed he orthopedics appointment because of the weather  I was able to speak to Dr Brito Nail after patient left  He agrees that the right shoulder/humerus pain is associated with the surgery from last fall 2017  Patient apparently did not follow-up with physical therapy  He had seen the patient recently - no other orthopedic manipulations are needed at this time      3  Pain control  Patient apparently has had issues with other medications in the past  We we discussed options  Patient is to continue to use the Ultram if Tylenol is not effective  This may require additional treatment in the future; patient apparently reacts poorly to narcotics  4  Bone metastases   Patient denies any pain in the areas that were abnormal on PET/CT  The future, we will discuss the possibility of bisphosphonates/rank ligand inhibitor  Patient is to return in 3 weeks  Patient knows to call the hematology/oncology office if they have any other questions or concerns  Carefully review your medication list and verify that the list is accurate and up-to-date  Please call the hematology/oncology office if there are medications missing from the list, medications on the list that you are not currently taking or if there is a dosage or instruction that is different from how you're taking that medication  Patient goals and areas of care: make decisions regarding CODE STATUS  Patient is able to self-care   _____________________________________________________________________________________    Chief Complaint   Patient presents with    Follow-up     non-small cell lung carcinoma     Advance Care Planning/Advance Directives:  Discussed today - patient needs to further discuss with family members at home  History of Present Illness:    59-year-old female previously referred for the above  Mrs Baez had right shoulder rotator cuff repair in September 2017  Postoperatively, during the physical therapy phase, patient was found to have a right supraclavicular mass  Patient has been seen by surgery and pulmonary  Recent biopsy demonstrated adenocarcinoma  Additional workup demonstrated metastatic disease  On the last office visit, we discussed options  Patient stated that she would not allow chemotherapy but would be interested anemia therapy or possibly oral treatment if she had a mutation  Genetic testing was recently re-submitted to the pathology department - results are still pending (checked by the [place] this morning)  Mrs Baez states feeling about the same as before  Patient still has right shoulder pain, same as before  Range of motion is decreased but same as before   No pain control issues with any other areas in the body  No shortness of breath or dyspnea on exertion  No chest pain or pressure  No cough, sputum or hemoptysis  No fevers, chills or sweats  Appetite is good, weight is stable  No other significant bodyaches  No other GI,  or GYN issues  Patient states that her quality of life at this time is pretty good  Review of Systems   Constitutional: Negative  HENT: Negative  Eyes: Negative  Respiratory: Negative  Cardiovascular: Negative  Gastrointestinal: Negative  Endocrine: Negative  Genitourinary: Negative  Musculoskeletal: Positive for arthralgias  Right shoulder and right humerus pain   Skin: Negative  Allergic/Immunologic: Negative  Neurological: Negative  Hematological: Negative  Psychiatric/Behavioral: Negative  All other systems reviewed and are negative       Patient Active Problem List   Diagnosis    Anxiety    Angular stomatitis    Chest pain    History of pulmonary embolism    Nodule of right lung    Complete tear of right rotator cuff     Past Medical History:   Diagnosis Date    Arthritis     Atrial fibrillation ([address] )     Borderline diabetes     watches diet    Diverticulitis     Fracture, foot 12/30/2016    right foot-no surgical intervention    Full dentures     GERD (gastroesophageal reflux disease)     Hearing aid worn     both ears    Hearing decreased, bilateral     has hearing    History of anemia 2016    transfused with 2 units-unknown etiology    History of transfusion 2016    2 units    Hypertension     Osteoporosis     Pulmonary embolism (HCC)     x2 episodes-on warfarin    Sciatica     Tachycardia     Wears glasses     for reading     Past Surgical History:   Procedure Laterality Date    CARPAL TUNNEL RELEASE Right     CATARACT EXTRACTION Bilateral     CHOLECYSTECTOMY      open    COLONOSCOPY      EGD AND COLONOSCOPY N/A 7/1/2016    Procedure: EGD AND COLONOSCOPY;  Surgeon: Ne Hollingsworth MD;  Location: [place] [address] GI [facility]; Service:     HYSTERECTOMY      one ovary remains    LYMPH NODE BIOPSY Right 1/24/2018    Procedure: EXCISIONAL BIOPSY SUPRACLAVICULAR LYMPH NODE;  Surgeon: Adolph Del Rosario MD;  Location: [address] [place] [address];  Service: General    NECK SURGERY      bone spur with bone transplant  2nd surgery "pinched nerve"    OR SHLDR ARTHROSCOP,SURG,W/ROTAT CUFF REPR Right 9/11/2017    Procedure: SHOULDER ARTHROSCOPY WITH ROTATOR CUFF REPAIR, BICEPS TENOTOMY AND SUBACROMIAL DECOMPRESSION;  Surgeon: Nathan Argueta MD;  [facility] MAIN OR;  Service: Orthopedics    ROTATOR CUFF REPAIR Left 01/08/2015     Family History   Problem Relation Age of Onset    Diabetes Mother     Heart attack Mother     Heart attack Father     Diabetes Sister      insulin dependent    Heart disease Sister      CABG    Diabetes Brother      insulin dependent    Heart disease Brother      CABG     Social History     Social History    Marital status: Single     Spouse name: N/A    Number of children: N/A    Years of education: N/A     Occupational History    Not on file  Social History Main Topics    Smoking status: Never Smoker    Smokeless tobacco: Never Used    Alcohol use No    Drug use: No    Sexual activity: No     Other Topics Concern    Not on file     Social History Narrative    No narrative on file       Current Outpatient Prescriptions:     acetaminophen (TYLENOL) 325 mg tablet, Take 2 tablets (650 mg total) by mouth every 6 (six) hours as needed for mild pain, headaches or fever  , Disp: 30 tablet, Rfl: 0    albuterol (PROVENTIL HFA,VENTOLIN HFA) 90 mcg/act inhaler, Inhale 1-2 puffs every 6 (six) hours as needed for wheezing, Disp: 1 Inhaler, Rfl: 0    Calcium Carb-Cholecalciferol (CALCIUM 600-D PO), Take by mouth Monday thru Friday, Disp: , Rfl:     Cholecalciferol (VITAMIN D PO), Take 3,000 Units by mouth Monday thru Friday, Disp: , Rfl:     colesevelam (WELCHOL) 625 mg tablet, Take 1,875 mg by mouth 2 (two) times a day with meals  , Disp: , Rfl:     GLUCOSAMINE SULFATE PO, Take 600 mg by mouth 2 (two) times a day, Disp: , Rfl:     Lidocaine (LIDODERM EX), Apply topically 2 (two) times a day Apply cream to right shoulder and arm, Disp: , Rfl:     lidocaine (XYLOCAINE) 5 % ointment, Apply topically, Disp: , Rfl:     nebivolol (BYSTOLIC) 10 mg tablet, Take 10 mg by mouth every morning  , Disp: , Rfl:     nebivolol (BYSTOLIC) 5 mg tablet, Take 5 mg by mouth daily with lunch  , Disp: , Rfl:     pantoprazole (PROTONIX) 40 mg tablet, Take 40 mg by mouth every morning  , Disp: , Rfl:     risedronate (ACTONEL) 150 MG tablet, Take 150 mg by mouth every 30 (thirty) days with water on empty stomach, nothing by mouth or lie down for next 30 minutes  , Disp: , Rfl:     warfarin (COUMADIN) 3 mg tablet, Take 3 mg by mouth daily at bedtime Last dose 1/18/18 , Disp: , Rfl:     Allergies   Allergen Reactions    Codeine Other (See Comments)     Patient experience chest pain    Cortisone Rash     Severe rash and redness    Pneumovax [Pneumococcal Polysaccharide Vaccine] Swelling     Swelling, red rash and pain at injection site    Penicillins     Asa [Aspirin] GI Intolerance       Vitals:    02/08/18 0941   BP: 138/86   Pulse: 73   Resp: 16   Temp: (!) 97 4 °F (36 3 °C)   SpO2: 98%     Physical Exam   Constitutional: She is oriented to person, place, and time  She appears well-developed and well-nourished  HENT:   Head: Normocephalic and atraumatic  Right Ear: External ear normal    Left Ear: External ear normal    Nose: Nose normal    Mouth/Throat: Oropharynx is clear and moist    Eyes: Conjunctivae and EOM are normal  Pupils are equal, round, and reactive to light  Neck: Normal range of motion  Neck supple  Cardiovascular: Normal rate, regular rhythm, normal heart sounds and intact distal pulses  Pulmonary/Chest: Effort normal and breath sounds normal    Rhonchi in right upper lung field   Abdominal: Soft   Bowel sounds are normal    Musculoskeletal: She exhibits tenderness  + tenderness in right shoulder and right upper humerus, decreased range of motion, no swelling or inflammation   Neurological: She is alert and oriented to person, place, and time  She has normal reflexes  Skin: Skin is warm  Skin is warm, moist, relatively good color, no petechiae or ecchymoses   Psychiatric: She has a normal mood and affect  Her behavior is normal  Judgment and thought content normal      Performance Status: 1 - Symptomatic but completely ambulatory    Labs:    8/31/17 WBC = 6 1 hemoglobin = 12 2 hematocrit = 36 6 platelet = 079  Imaging    1/19/18 PET/CT    1  Enlarging right upper lobe hypermetabolic mass compatible with primary bronchogenic carcinoma of the lung  2   There is evidence of hypermetabolic adenopathy at the neck base, right supraclavicular fossa, mediastinum, and kimmie bilaterally  3   Osseous metastases detected in the right anterior sacrum and posterior right iliac crest   4   No soft tissue metastases detected within the abdomen or pelvis    11/29/17 CAT scan of the chest and abdomen/pelvis demonstrated a right upper lobe spiculated mass, 2 3 x 1 9 cm  Patient may have postobstructive pneumonitis  Patient had mild pulmonary emphysema and biapical pleural parenchymal scarring  Right hilar adenopathy was present  Patient had bilateral supra-clavicular adenopathy  There were no findings suggestive of metastatic disease in the abdomen or pelvis  No destructive osseous lesions were commented on       Pathology    Surgical Pathology Report                         Case: [id number]                                    Authorizing Provider: Mal Lora MD       Collected:           01/24/2018 1002               Ordering Location:     [address] [place] [address] Received:            01/24/2018 1024                                      Operating Room                                                             Pathologist:           Ynes Gutierrez MD                                                         Intraop:               Ynes Gutierrez MD                                                         Specimen:    Lymph Node, RT supraclavicular lymph node                                                  Final Diagnosis   A  Lymph node, right supraclavicular, excision:  -  Metastatic adenocarcinoma consistent with pulmonary origin  -  Immunohistochemical stains performed with appropriate controls show the tumor to be positive for TTF-1, Napsin, and CK7 and negative for CK 20, supporting the diagnosis  -  Very scant residual lymphoid tissue is present with reactive features  Electronically signed by Ynes Gutierrez MD on 1/26/2018 at  1:07 PM       12/29/17 right supraclavicular lymph node core biopsy demonstrated metastatic adenocarcinoma compatible with a pulmonary origin  Immune stains demonstrated the lesion to be positive for TTF-1 and CK 7

## 2018-02-08 NOTE — PROGRESS NOTES
Subjective    Surinder Cheung is a 68 y o  female who presents today status post supraclavicular lymph node biopsy  No issues at this point from a surgical standpoint  She has followed up with Dr Nini Brownlee and has also decided that she will not be undergoing chemotherapy  There are a few other tests that will need to be conducted on the tissue as per Dr Nini Brownlee and the therapy will then be recommended to the patient  Patient states that she had just seen Dr Nini Brownlee this morning  The following portions of the patient's history were reviewed by a provider in this encounter and updated as appropriate:   No text in SmartText          Objective   /76   Pulse 81   Temp (!) 97 4 °F (36 3 °C) (Tympanic)   Resp 16   Ht 5' (1 524 m)   Wt 47 2 kg (104 lb)   BMI 20 31 kg/m²   Wound:  Wound is healing very well and does not show any signs of disruption  No erythema, or induration is noted around the wound  At the most caudad portion there is a little bit of a scab  Assessment/Plan   There are no diagnoses linked to this encounter  Normal postoperative course status post supraclavicular lymph node biopsy  Confirmatory of metastatic lung cancer at this point unfortunately  In this context I am set to say that I only placed the role of a surgical technician to get tissue and therefore  Am really power list to do anything further, which pains me    I have offered my support and wished Ashtyn Hancock the best   Follow up p urmila Ribera MD

## 2018-02-11 ENCOUNTER — HOSPITAL ENCOUNTER (EMERGENCY)
Facility: HOSPITAL | Age: 78
Discharge: HOME | End: 2018-02-11
Attending: EMERGENCY MEDICINE | Admitting: EMERGENCY MEDICINE
Payer: MEDICARE

## 2018-02-11 VITALS
HEART RATE: 79 BPM | DIASTOLIC BLOOD PRESSURE: 86 MMHG | SYSTOLIC BLOOD PRESSURE: 185 MMHG | BODY MASS INDEX: 20.31 KG/M2 | OXYGEN SATURATION: 100 % | WEIGHT: 104 LBS | RESPIRATION RATE: 18 BRPM | TEMPERATURE: 97.7 F

## 2018-02-11 DIAGNOSIS — D68.9 COAGULOPATHY (HCC): Primary | ICD-10-CM

## 2018-02-11 DIAGNOSIS — R04.0 RIGHT-SIDED NOSEBLEED: ICD-10-CM

## 2018-02-11 LAB
APTT PPP: 42 SECONDS (ref 24–33)
BASOPHILS # BLD AUTO: 0 THOUSANDS/ΜL (ref 0–0.1)
BASOPHILS NFR BLD AUTO: 1 % (ref 0–1)
EOSINOPHIL # BLD AUTO: 0 THOUSAND/ΜL (ref 0–0.61)
EOSINOPHIL NFR BLD AUTO: 1 % (ref 0–6)
ERYTHROCYTE [DISTWIDTH] IN BLOOD BY AUTOMATED COUNT: 15.3 % (ref 11.6–15.1)
HCT VFR BLD AUTO: 34.1 % (ref 37–47)
HGB BLD-MCNC: 11.1 G/DL (ref 12–16)
INR PPP: 5.23 (ref 0.86–1.16)
LYMPHOCYTES # BLD AUTO: 2.2 THOUSANDS/ΜL (ref 0.6–4.47)
LYMPHOCYTES NFR BLD AUTO: 41 % (ref 14–44)
MCH RBC QN AUTO: 27.1 PG (ref 27–31)
MCHC RBC AUTO-ENTMCNC: 32.5 G/DL (ref 31.4–37.4)
MCV RBC AUTO: 83 FL (ref 82–98)
MONOCYTES # BLD AUTO: 0.5 THOUSAND/ΜL (ref 0.17–1.22)
MONOCYTES NFR BLD AUTO: 9 % (ref 4–12)
NEUTROPHILS # BLD AUTO: 2.6 THOUSANDS/ΜL (ref 1.85–7.62)
NEUTS SEG NFR BLD AUTO: 49 % (ref 43–75)
NRBC BLD AUTO-RTO: 0 /100 WBCS
PLATELET # BLD AUTO: 263 THOUSANDS/UL (ref 130–400)
PMV BLD AUTO: 6.9 FL (ref 8.9–12.7)
PROTHROMBIN TIME: 55.8 SECONDS (ref 9.4–11.7)
RBC # BLD AUTO: 4.1 MILLION/UL (ref 4.2–5.4)
WBC # BLD AUTO: 5.3 THOUSAND/UL (ref 4.8–10.8)

## 2018-02-11 PROCEDURE — 99283 EMERGENCY DEPT VISIT LOW MDM: CPT

## 2018-02-11 PROCEDURE — 85025 COMPLETE CBC W/AUTO DIFF WBC: CPT | Performed by: EMERGENCY MEDICINE

## 2018-02-11 PROCEDURE — 85610 PROTHROMBIN TIME: CPT | Performed by: EMERGENCY MEDICINE

## 2018-02-11 PROCEDURE — 36415 COLL VENOUS BLD VENIPUNCTURE: CPT | Performed by: EMERGENCY MEDICINE

## 2018-02-11 PROCEDURE — 85730 THROMBOPLASTIN TIME PARTIAL: CPT | Performed by: EMERGENCY MEDICINE

## 2018-02-11 RX ADMIN — PHENYLEPHRINE HYDROCHLORIDE 3 DROP: 1 SPRAY NASAL at 13:04

## 2018-02-11 NOTE — DISCHARGE INSTRUCTIONS
Epistaxis   WHAT YOU SHOULD KNOW:   Epistaxis is a nosebleed  A nosebleed occurs when the blood vessels near the surface of the nasal cavity are injured or damaged  AFTER YOU LEAVE:   Medicines:   · Nasal sprays:  Vasoconstrictor nasal spray is a medicine that helps make nasal blood vessels narrower  This limits the blood flow and stops the bleeding  This medicine also decreases the swelling inside your nose and helps you breathe easier  You may also be directed to use saline or other nasal sprays to add moisture to your nose  · Antibiotics: This medicine is given to help treat or prevent an infection caused by bacteria  · Take your medicine as directed  Call your healthcare provider if you think your medicine is not helping or if you have side effects  Tell him if you are allergic to any medicine  Keep a list of the medicines, vitamins, and herbs you take  Include the amounts, and when and why you take them  Bring the list or the pill bottles to follow-up visits  Carry your medicine list with you in case of an emergency  Follow up with your primary healthcare provider or otolaryngologist within 2 to 3 days or as directed: Any packing in your nose should be removed within 2 to 3 days  Write down your questions so you remember to ask them during your visits  First aid:   · Sit up and lean forward: This will help prevent you from swallowing blood  Spit blood and saliva into a bowl  · Apply pressure to your nose:  Use 2 fingers to pinch your nose shut for 10 minutes  This will help stop the bleeding  Breathe through your mouth  · Apply ice:  Use a cold pack or put crushed ice in a bag, cover with a towel, and place on the bridge of your nose  · Nasal packing:  Pack your nose with a cotton ball, tissue, tampon, or gauze bandage to stop the bleeding  Prevent epistaxis:  · Avoid nose picking and blowing your nose too hard: You can irritate or damage your nose if you pick it   Blowing your nose too hard may cause the bleeding to start again  · Avoid irritants:  Substances that can irritate your nose should be avoided  These include tobacco smoke and chemical sprays such as  that contain ammonia  · Use a cool mist humidifier in your home: This will add the moisture to the air and help keep your nose moist      · Put a small amount of petroleum jelly inside your nostrils: You may apply a small amount of petroleum jelly if you do not have a nasal packing  This will help keep your nose from drying out or getting irritated  Do not put anything else inside your nose unless your primary healthcare provider tells you to do so  Contact your primary healthcare provider or otolaryngologist if:   · You have a fever and are vomiting  · You have pain in and around your nose that is getting worse even after you take pain medicines  · Your nasal pack is loose  · You have questions or concerns about your condition or care  Seek care immediately if:   · Your nasal packing is soaked with blood  · Your nose is still bleeding after 20 minutes, even after you pinch it  · You have a foul-smelling discharge coming out of your nose  · You feel so weak and dizzy that you have trouble standing up  · You have trouble breathing or talking  © 2014 [address] [employer] is for End User's use only and may not be sold, redistributed or otherwise used for commercial purposes  All illustrations and images included in CareNotes® are the copyrighted property of [employer]  or Carlos Herrera  The above information is an  only  It is not intended as medical advice for individual conditions or treatments  Talk to your doctor, nurse or pharmacist before following any medical regimen to see if it is safe and effective for you

## 2018-02-11 NOTE — ED PROVIDER NOTES
History  Chief Complaint   Patient presents with    Nose Bleed     Pt reports nose bleed lasted about 45 mins  Patient was essentially arrest 45 minutes prior to arrival when she started having pavel bleeding from the left nares  There was no injury no pain  Patient held pressure checking it frequently and would continue to bleed and decided to come to be checked  By time of arrival bleeding has stopped  Patient reports that she took out a big clot  Patient states that she is on Coumadin which she had to recently hold and restart after a lymph node biopsy  She had her last PT INR done on Monday which she reports was okay and was told not to make any changes this week  She has no bleeding from any other site            Prior to Admission Medications   Prescriptions Last Dose Informant Patient Reported? Taking? Calcium Carb-Cholecalciferol (CALCIUM 600-D PO)  Self Yes No   Sig: Take by mouth Monday thru Friday   Cholecalciferol (VITAMIN D PO)  Self Yes No   Sig: Take 3,000 Units by mouth Monday thru Friday   GLUCOSAMINE SULFATE PO  Self Yes No   Sig: Take 600 mg by mouth 2 (two) times a day   Lidocaine (LIDODERM EX)  Self Yes No   Sig: Apply topically 2 (two) times a day Apply cream to right shoulder and arm   acetaminophen (TYLENOL) 325 mg tablet  Self No No   Sig: Take 2 tablets (650 mg total) by mouth every 6 (six) hours as needed for mild pain, headaches or fever     albuterol (PROVENTIL HFA,VENTOLIN HFA) 90 mcg/act inhaler  Self No No   Sig: Inhale 1-2 puffs every 6 (six) hours as needed for wheezing   colesevelam (WELCHOL) 625 mg tablet  Self Yes No   Sig: Take 1,875 mg by mouth 2 (two) times a day with meals     lidocaine (XYLOCAINE) 5 % ointment  Self Yes No   Sig: Apply topically   nebivolol (BYSTOLIC) 10 mg tablet  Self Yes No   Sig: Take 10 mg by mouth every morning     nebivolol (BYSTOLIC) 5 mg tablet  Self Yes No   Sig: Take 5 mg by mouth daily with lunch     pantoprazole (PROTONIX) 40 mg tablet  Self Yes No   Sig: Take 40 mg by mouth every morning     risedronate (ACTONEL) 150 MG tablet  Self Yes No   Sig: Take 150 mg by mouth every 30 (thirty) days with water on empty stomach, nothing by mouth or lie down for next 30 minutes  warfarin (COUMADIN) 3 mg tablet  Self Yes No   Sig: Take 3 mg by mouth daily at bedtime Last dose 1/18/18       Facility-Administered Medications: None       Past Medical History:   Diagnosis Date    Arthritis     Atrial fibrillation ([facility] )     Borderline diabetes     watches diet    Cancer ([facility] [address] )     lymphnodes, lung, stomach, possible cervical, bone    Diverticulitis     Fracture, foot 12/30/2016    right foot-no surgical intervention    Full dentures     GERD (gastroesophageal reflux disease)     Hearing aid worn     both ears    Hearing decreased, bilateral     has hearing    History of anemia 2016    transfused with 2 units-unknown etiology    History of transfusion 2016    2 units    Hypertension     Osteoporosis     Pulmonary embolism (HCC)     x2 episodes-on warfarin    Sciatica     Tachycardia     Wears glasses     for reading       Past Surgical History:   Procedure Laterality Date    CARPAL TUNNEL RELEASE Right     CATARACT EXTRACTION Bilateral     CHOLECYSTECTOMY      open    COLONOSCOPY      EGD AND COLONOSCOPY N/A 7/1/2016    Procedure: EGD AND COLONOSCOPY;  Surgeon: Jennifer Hearn MD;  Location: [facility]; Service:     HYSTERECTOMY      one ovary remains    LYMPH NODE BIOPSY Right 1/24/2018    Procedure: EXCISIONAL BIOPSY SUPRACLAVICULAR LYMPH NODE;  Surgeon: Luis Manzanares MD;  Location: [address] [place] [address];  Service: General    NECK SURGERY      bone spur with bone transplant   2nd surgery "pinched nerve"    IN SHLDR ARTHROSCOP,SURG,W/ROTAT CUFF REPR Right 9/11/2017    Procedure: SHOULDER ARTHROSCOPY WITH ROTATOR CUFF REPAIR, BICEPS TENOTOMY AND SUBACROMIAL DECOMPRESSION;  Surgeon: Courtney Oglesby MD;  Location: [facility]; Service: Orthopedics    ROTATOR CUFF REPAIR Left 01/08/2015       Family History   Problem Relation Age of Onset    Diabetes Mother     Heart attack Mother     Heart attack Father     Diabetes Sister      insulin dependent    Heart disease Sister      CABG    Diabetes Brother      insulin dependent    Heart disease Brother      CABG     I have reviewed and agree with the history as documented  Social History   Substance Use Topics    Smoking status: Never Smoker    Smokeless tobacco: Never Used    Alcohol use No        Review of Systems   Constitutional: Negative for fever  HENT: Positive for nosebleeds  Negative for congestion  Respiratory: Negative for shortness of breath  Cardiovascular: Negative for chest pain  Gastrointestinal: Negative for abdominal pain  Hematological: Bruises/bleeds easily  All other systems reviewed and are negative  Physical Exam  ED Triage Vitals [02/11/18 1206]   Temperature Pulse Respirations Blood Pressure SpO2   97 7 °F (36 5 °C) 79 18 (!) 185/86 100 %      Temp Source Heart Rate Source Patient Position - Orthostatic VS BP Location FiO2 (%)   Tympanic Monitor Sitting Left arm --      Pain Score       8           Orthostatic Vital Signs  Vitals:    02/11/18 1206   BP: (!) 185/86   Pulse: 79   Patient Position - Orthostatic VS: Sitting       Physical Exam   Constitutional: She is oriented to person, place, and time  She appears well-developed and well-nourished  HENT:   Head: Normocephalic and atraumatic  Mouth/Throat: Oropharynx is clear and moist    Patient has naso pharynx is completely normal   There is no blood or blood clot, no evidence of recent bleeding  Eyes: Conjunctivae are normal    Neck: Normal range of motion  Neck supple  No JVD present  Cardiovascular: Normal rate, regular rhythm and normal heart sounds  Pulmonary/Chest: Effort normal and breath sounds normal    Abdominal: Soft   Bowel sounds are normal    Musculoskeletal: Normal range of motion  She exhibits no edema  Neurological: She is alert and oriented to person, place, and time  Skin: Skin is warm and dry  Psychiatric: She has a normal mood and affect  Her behavior is normal    Nursing note and vitals reviewed  ED Medications  Medications   phenylephrine (EDUARDO-SYNEPHRINE) 1 % nasal solution 3 drop (3 drops Each Nare Given 2/11/18 1304)       Diagnostic Studies  Results Reviewed     Procedure Component Value Units Date/Time    Protime-INR [[id number]]  (Abnormal) Collected:  02/11/18 1306    Lab Status:  Final result Specimen:  Blood from Arm, Left Updated:  02/11/18 1336     Protime 55 8 (H) seconds      INR 5 23 (HH)    APTT [[id number]]  (Abnormal) Collected:  02/11/18 1306    Lab Status:  Final result Specimen:  Blood from Arm, Left Updated:  02/11/18 1331     PTT 42 (H) seconds     Narrative:          Therapeutic Heparin Range = 60-90 seconds    CBC and differential [[id number]]  (Abnormal) Collected:  02/11/18 1306    Lab Status:  Final result Specimen:  Blood from Arm, Left Updated:  02/11/18 1316     WBC 5 30 Thousand/uL      RBC 4 10 (L) Million/uL      Hemoglobin 11 1 (L) g/dL      Hematocrit 34 1 (L) %      MCV 83 fL      MCH 27 1 pg      MCHC 32 5 g/dL      RDW 15 3 (H) %      MPV 6 9 (L) fL      Platelets 358 Thousands/uL      nRBC 0 /100 WBCs      Neutrophils Relative 49 %      Lymphocytes Relative 41 %      Monocytes Relative 9 %      Eosinophils Relative 1 %      Basophils Relative 1 %      Neutrophils Absolute 2 60 Thousands/µL      Lymphocytes Absolute 2 20 Thousands/µL      Monocytes Absolute 0 50 Thousand/µL      Eosinophils Absolute 0 00 Thousand/µL      Basophils Absolute 0 00 Thousands/µL                  No orders to display              Procedures  Procedures       Phone Contacts  ED Phone Contact    ED Course  ED Course                                MDM  Number of Diagnoses or Management Options  Coagulopathy [facility]):   Right-sided nosebleed: Diagnosis management comments: With says the checked the patient's INR and she is in fact coagulopathic  I told her to hold the next 2 doses of Coumadin tonight and tomorrow night, check with her doctor restarted on Tuesday  CritCare Time    Disposition  Final diagnoses:   Coagulopathy ([address] )   Right-sided nosebleed     Time reflects when diagnosis was documented in both MDM as applicable and the Disposition within this note     Time User Action Codes Description Comment    2/11/2018  1:58 PM Obdulia Campbell Add [D68 9] Coagulopathy ([address] )     2/11/2018  1:58 PM Obdulia Campbell Add [R04 0] Right-sided nosebleed       ED Disposition     ED Disposition Condition Comment    Discharge  Devin White discharge to home/self care  Condition at discharge: Stable        Follow-up Information     Follow up With Specialties Details Why Monica Chaves MD Internal Medicine Schedule an appointment as soon as possible for a visit in 1 day  [address] [place] [address]  [phone]          Patient's Medications   Discharge Prescriptions    No medications on file     No discharge procedures on file      ED Provider  Electronically Signed by           Nilda Suero MD  02/11/18 5428

## 2018-02-14 ENCOUNTER — OFFICE VISIT (OUTPATIENT)
Dept: OBGYN CLINIC | Facility: CLINIC | Age: 78
End: 2018-02-14
Payer: MEDICARE

## 2018-02-14 VITALS — WEIGHT: 102.4 LBS | BODY MASS INDEX: 20.1 KG/M2 | HEIGHT: 60 IN

## 2018-02-14 DIAGNOSIS — M75.51 SUBACROMIAL BURSITIS OF RIGHT SHOULDER JOINT: Primary | ICD-10-CM

## 2018-02-14 PROCEDURE — 99214 OFFICE O/P EST MOD 30 MIN: CPT | Performed by: ORTHOPAEDIC SURGERY

## 2018-02-14 PROCEDURE — 20610 DRAIN/INJ JOINT/BURSA W/O US: CPT | Performed by: ORTHOPAEDIC SURGERY

## 2018-02-14 RX ADMIN — DEXAMETHASONE SODIUM PHOSPHATE 40 MG: 100 INJECTION INTRAMUSCULAR; INTRAVENOUS at 11:15

## 2018-02-14 RX ADMIN — LIDOCAINE HYDROCHLORIDE 4 ML: 10 INJECTION, SOLUTION INFILTRATION; PERINEURAL at 11:15

## 2018-02-14 NOTE — PROGRESS NOTES
H&P Exam - Jeovanny Prince VENUS Baez 68 y o  female MRN: [id number]  Unit/Bed#:  Encounter: [id number]    Assessment/Plan     Assessment:  Subacromial bursitis of right shoulder  Plan:  Inocente Cruz likely has some inflammation of her right rotator cuff that I think will respond well to a cortisone injection  She agreed to proceed with the injection  She understood and had no further questions  She will follow up as needed  Scribe Attestation    I,:   Ailyn Cross am acting as a scribe while in the presence of the attending physician :        I,:   Patt Arnold MD personally performed the services described in this documentation    as scribed in my presence :            Large joint arthrocentesis  Date/Time: 2/14/2018 11:15 AM  Consent given by: patient  Site marked: site marked  Timeout: Immediately prior to procedure a time out was called to verify the correct patient, procedure, equipment, support staff and site/side marked as required   Supporting Documentation  Indications: pain   Procedure Details  Location: shoulder - R subacromial bursa  Needle size: 22 G  Ultrasound guidance: no  Approach: posterior  Medications administered: 4 mL lidocaine 1 %; 40 mg dexamethasone 100 mg/10 mL    Patient tolerance: patient tolerated the procedure well with no immediate complications  Dressing:  Sterile dressing applied      History of Present Illness   HPI:  Angel Galloway is a 68 y o  female who presents here today with continued right shoulder pain  She states that she has issues with reaching especially with making the bed and doing dishes  She states that times, she is unable to raise her arm to do her hair  The pain will be sharp when she does certain movements and then go away  She has pain over her anterior aspect of her right shoulder  She states that she still does her home exercises  Review of Systems   Constitutional: Negative for chills, fever and unexpected weight change     HENT: Negative for hearing loss, nosebleeds and sore throat  Eyes: Negative for pain, redness and visual disturbance  Respiratory: Negative for cough, shortness of breath and wheezing  Cardiovascular: Negative for chest pain, palpitations and leg swelling  Gastrointestinal: Negative for abdominal distention, nausea and vomiting  Endocrine: Negative for polydipsia and polyuria  Genitourinary: Negative for dysuria and hematuria  Skin: Negative for rash and wound  Neurological: Negative for dizziness, numbness and headaches  Psychiatric/Behavioral: Negative for decreased concentration and suicidal ideas  The patient is not nervous/anxious  All other systems reviewed and are negative  Historical Information   Past Medical History:   Diagnosis Date    Arthritis     Atrial fibrillation [facility] [address] )     Borderline diabetes     watches diet    Cancer ([facility] [address] )     lymphnodes, lung, stomach, possible cervical, bone    Diverticulitis     Fracture, foot 12/30/2016    right foot-no surgical intervention    Full dentures     GERD (gastroesophageal reflux disease)     Hearing aid worn     both ears    Hearing decreased, bilateral     has hearing    History of anemia 2016    transfused with 2 units-unknown etiology    History of transfusion 2016    2 units    Hypertension     Osteoporosis     Pulmonary embolism (HCC)     x2 episodes-on warfarin    Sciatica     Tachycardia     Wears glasses     for reading     Past Surgical History:   Procedure Laterality Date    CARPAL TUNNEL RELEASE Right     CATARACT EXTRACTION Bilateral     CHOLECYSTECTOMY      open    COLONOSCOPY      EGD AND COLONOSCOPY N/A 7/1/2016    Procedure: EGD AND COLONOSCOPY;  Surgeon: Louise Small MD;  Location: [facility];   Service:     HYSTERECTOMY      one ovary remains    LYMPH NODE BIOPSY Right 1/24/2018    Procedure: EXCISIONAL BIOPSY SUPRACLAVICULAR LYMPH NODE;  Surgeon: Carmen Caldera MD;  Location: [address] [place] [address];  Service: General  NECK SURGERY      bone spur with bone transplant  2nd surgery "pinched nerve"    CO SHLDR ARTHROSCOP,SURG,W/ROTAT CUFF REPR Right 9/11/2017    Procedure: SHOULDER ARTHROSCOPY WITH ROTATOR CUFF REPAIR, BICEPS TENOTOMY AND SUBACROMIAL DECOMPRESSION;  Surgeon: Sweta Zeng MD;  Location: [facility];  Service: Orthopedics    ROTATOR CUFF REPAIR Left 01/08/2015     Social History   History   Alcohol Use No     History   Drug Use No     History   Smoking Status    Never Smoker   Smokeless Tobacco    Never Used     Family History:   Family History   Problem Relation Age of Onset    Diabetes Mother     Heart attack Mother     Heart attack Father     Diabetes Sister      insulin dependent    Heart disease Sister      CABG    Diabetes Brother      insulin dependent    Heart disease Brother      CABG       Meds/Allergies   PTA meds:   Cannot display prior to admission medications because the patient has not been admitted in this contact  Allergies   Allergen Reactions    Codeine Other (See Comments)     Patient experience chest pain    Cortisone Rash     Severe rash and redness    Pneumovax [Pneumococcal Polysaccharide Vaccine] Swelling     Swelling, red rash and pain at injection site    Penicillins     Asa [Aspirin] GI Intolerance       Objective   Vitals: Height 5' (1 524 m), weight 46 4 kg (102 lb 6 4 oz), not currently breastfeeding  ,Body mass index is 20 kg/m²  [unfilled]    [unfilled]    Invasive Devices          No matching active lines, drains, or airways          Physical Exam   Constitutional: She is oriented to person, place, and time  She appears well-developed and well-nourished  HENT:   Head: Normocephalic and atraumatic  Eyes: Conjunctivae and EOM are normal  Pupils are equal, round, and reactive to light  Neck: Normal range of motion  Cardiovascular: Intact distal pulses  Pulmonary/Chest: Effort normal  No respiratory distress     Musculoskeletal: Normal range of motion  Neurological: She is alert and oriented to person, place, and time  Skin: Skin is warm and dry  Psychiatric: She has a normal mood and affect  Her behavior is normal      Right Shoulder Exam     Tenderness   The patient is experiencing tenderness in the biceps tendon  Muscle Strength   Abduction: 4/5   Internal Rotation: 4/5   External Rotation: 4/5   Supraspinatus: 4/5   Subscapularis: 4/5   Biceps: 4/5     Tests   Hawkin's test: positive  Impingement: positive    Other   Erythema: absent  Scars: absent  Sensation: normal  Pulse: present            Lab Results: CBC: No results found for: WBC, HGB, HCT, MCV, PLT, ADJUSTEDWBC, MCH, MCHC, RDW, MPV, NRBC  Imaging: I have personally reviewed pertinent reports  EKG, Pathology, and Other Studies: I have personally reviewed pertinent reports        Code Status: [unfilled]  Advance Directive and Living Will:      Power of :    POLST:

## 2018-02-14 NOTE — PATIENT INSTRUCTIONS
Rotator Cuff Tendinitis   WHAT YOU NEED TO KNOW:   What is rotator cuff tendinitis? Rotator cuff tendinitis is inflammation of the tendons in your shoulder joint  A tendon is a cord of tough tissue that connects your muscles to your bones  The rotator cuff is made up of a group of muscles and tendons that hold the shoulder joint in place  What causes rotator cuff tendinitis? · Overuse: This happens from too much shoulder activity  Overuse commonly happens to athletes, such as baseball pitchers, swimmers, and tennis players  You may also develop this condition if you frequently have to work with your arms overhead  · Impingement: This injury happens when the arm bone moves up and traps the tendon  Falls, incorrect arm movements, and weak shoulder muscles may cause impingement  This may also happen if you overtrain for sports or have a sudden change in arm or shoulder activity  · Calcium deposits:  Calcium may deposit in the tendons and cause irritation and inflammation of the tendon  What are the signs and symptoms of rotator cuff tendinitis? You have pain and swelling in your shoulder, especially when you lift your arm over your head  The pain may be worse after you sleep on the affected shoulder  Over time, the pain can become worse and you may have pain even when you are resting  Your shoulder and arm may also be weak  How is rotator cuff tendinitis diagnosed? Your healthcare provider may test your shoulder by moving your arm in different directions and raising it over your head  · A joint x-ray  is a picture of the bones and tissues in your joints  You may be given contrast liquid to help the pictures show up better  Tell a healthcare provider if you have ever had an allergic reaction to contrast liquid  · MRI:  This scan uses powerful magnets and a computer to take pictures of your shoulder  An MRI may be used to look for tendon injuries or other problems   You may be given dye to help the pictures show up better  Tell the healthcare provider if you have ever had an allergic reaction to contrast dye  Do not enter the MRI room with any metal  Metal can cause serious injury  Tell the healthcare provider if you have any metal in or on your body  · Ultrasound: An ultrasound uses sound waves to show pictures on a monitor  An ultrasound of your shoulder may be done to look for damage to your tendons  How is rotator cuff tendinitis treated? · Medicines:      ¨ NSAIDs:  These medicines decrease swelling and pain  NSAIDs are available without a doctor's order  Ask your healthcare provider which medicine is right for you  Ask how much to take and when to take it  Take as directed  NSAIDs can cause stomach bleeding or kidney problems if not taken correctly  ¨ Steroids: This medicine may be injected into the rotator cuff area to decrease inflammation and pain  · Surgery: You may need surgery if the pain and tightening in your shoulder do not go away  This may also be done if pain worsens or is so severe that it affects your daily activities  During surgery, your healthcare provider may remove bone spurs and inflamed tissue around the shoulder  · Physical therapy:  A physical therapist can teach you exercises to help improve movement and strength, and to decrease pain  The exercises may help you move your shoulder normally again and strengthen your rotator cuff  You may also learn other exercises, such as stretching and strengthening of your shoulder muscles  You may learn changes to make to your daily activities that will help decrease stress on your tendons  How can I care for my rotator cuff tendinitis at home? · Rest:  Limit activity on your affected shoulder to decrease stress on the tendon  This may help prevent further damage, decrease pain, and promote healing  · Ice:  Ice helps decrease swelling and pain  Ice may also help prevent tissue damage   Use an ice pack, or put crushed ice in a plastic bag  Cover it with a towel and place it on your shoulder for 15 to 20 minutes every hour or as directed  · Shoulder position:  Keep your shoulder in the correct position so it will heal faster  This may be done by increasing the height of armrests while you work, drive, and sit  Try not to sleep on the side of your injured shoulder  If you are a woman, wear a sports bra so that the straps are closer to your neck  This may help decrease stress in the affected shoulder  What are the risks of rotator cuff tendinitis? You could get an infection or bleed more than expected with surgery  Even after treatment, the shoulder may not move the way it did before  Without treatment, rotator cuff tendinitis may cause further problems with your arms and shoulders  You may not be able to do your usual physical activities  When should I contact my healthcare provider? · You have a fever  · You have pain and swelling in your shoulder even after you take pain medicine  · Your skin is itchy, swollen, or has a rash  · Your symptoms are not getting better or are getting worse  · You have questions or concerns about your condition or care  When should I seek immediate care or call 911? · You have sudden shortness of breath or chest pain  · Any part of your arm is numb, tingly, cold, blue, or pale  CARE AGREEMENT:   You have the right to help plan your care  Learn about your health condition and how it may be treated  Discuss treatment options with your caregivers to decide what care you want to receive  You always have the right to refuse treatment  The above information is an  only  It is not intended as medical advice for individual conditions or treatments  Talk to your doctor, nurse or pharmacist before following any medical regimen to see if it is safe and effective for you    © 2017 [employer] Information is for End User's use only and may not be sold, redistributed or otherwise used for commercial purposes  All illustrations and images included in CareNotes® are the copyrighted property of [employer]  or Carlos Herrera

## 2018-02-15 RX ORDER — DEXAMETHASONE SODIUM PHOSPHATE 100 MG/10ML
40 INJECTION INTRAMUSCULAR; INTRAVENOUS
Status: COMPLETED | OUTPATIENT
Start: 2018-02-14 | End: 2018-02-14

## 2018-02-15 RX ORDER — LIDOCAINE HYDROCHLORIDE 10 MG/ML
4 INJECTION, SOLUTION INFILTRATION; PERINEURAL
Status: COMPLETED | OUTPATIENT
Start: 2018-02-14 | End: 2018-02-14

## 2018-02-26 NOTE — CONSULTS
I had the pleasure of evaluating your patient, Urvashi Whitman  My full evaluation follows:      Chief Complaint  Chief Complaint:   The patient presents to the office today with Recent diagnosis of lung cancer, follow-up  History of Present Illness  49-year-old female previously referred for the above  Mrs Baez had right shoulder rotator cuff repair in September 2017  Postoperatively, during the physical therapy phase, patient was found to have a right supraclavicular mass  Patient has been seen by surgery and pulmonary  Recent biopsy demonstrated adenocarcinoma  Additional workup demonstrated a right upper lobe mass  On the last office visit, we discussed options  Patient stated that she would not allow chemotherapy but would be interested anemia therapy or possibly oral treatment if she had a mutation  Genetic testing was recently submitted to the pathology department  Apparently there is no additional material for testing  Patient was recalled to discuss this  Mrs Baez has had more pain in the right shoulder recently  Range of motion is decreased  No shortness of breath or dyspnea on exertion  No chest pain or pressure  No cough, sputum or hemoptysis  Appetite is good, patient is actually gained weight recently  No fevers, chills or sweats  No headaches, syncopal episodes or other body aches  No other GI,  or GYN issues  Review of Systems    Constitutional: as noted in HPI  Eyes: No complaints of eye pain, no red eyes, no eyesight problems, no discharge, no dry eyes, no itching of eyes  ENT: no complaints of earache, no loss of hearing, no nose bleeds, no nasal discharge, no sore throat, no hoarseness  Cardiovascular: No complaints of slow heart rate, no fast heart rate, no chest pain, no palpitations, no leg claudication, no lower extremity edema  Respiratory: as noted in HPI     Gastrointestinal: No complaints of abdominal pain, no constipation, no nausea or vomiting, no diarrhea, no bloody stools  Genitourinary: No complaints of dysuria, no incontinence, no pelvic pain, no dysmenorrhea, no vaginal discharge or bleeding  Musculoskeletal: arthralgias and myalgias, but as noted in HPI  Integumentary: No complaints of skin rash or lesions, no itching, no skin wounds, no breast pain or lump  Neurological: No complaints of headache, no confusion, no convulsions, no numbness, no dizziness or fainting, no tingling, no limb weakness, no difficulty walking  Psychiatric: Not suicidal, no sleep disturbance, no anxiety or depression, no change in personality, no emotional problems  Endocrine: No complaints of proptosis, no hot flashes, no muscle weakness, no deepening of the voice, no feelings of weakness  Hematologic/Lymphatic: No complaints of swollen glands, no swollen glands in the neck, does not bleed easily, does not bruise easily  Active Problems    1  Adenocarcinoma of left lung (162 9) (C34 92)   2  Arthritis (716 90) (M19 90)   3  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 (718 01) (M24 111)   4  Benign essential hypertension (401 1) (I10)   5  Complete tear of right rotator cuff (727 61) (M75 121)   6  Cough (786 2) (R05)   7  Fracture of fifth metatarsal bone of right foot (825 25) (S92 351A)   8  Gastritis (535 50) (K29 70)   9  Hiatal hernia (553 3) (K44 9)   10  History of repair of right rotator cuff (V15 29) (Z98 890)   11  Left rotator cuff tear (840 4) (M75 102)   12  Mass (782 2) (R22 9)   13  Mass of right lung (786 6) (R91 8)   14  Mass of right side of neck (784 2) (R22 1)   15  Neck mass (784 2) (R22 1)   16  Right shoulder pain (719 41) (M25 511)   17  Right shoulder pain (719 41) (M25 511)   18  Shoulder impingement, right (726 2) (M75 41)   19  Spontaneous rupture of flexor tendon of right upper arm (727 62) (M66 321)   20  Supraclavicular mass (786 6) (R22 2)   21   Tear of right rotator cuff, unspecified tear extent (840 4) (M75 101)    Past Medical History    · History of Abnormal heart rate (785 3) (R00 9)   · Arthritis (716 90) (M19 90)   · History of Chronic sinus complaints (786 9) (R09 89)   · History of blood clots (V12 51) (Z86 718)   · History of blood disorder (V12 3) (Z86 2)   · History of gastroesophageal reflux (GERD) (V12 79) (Z87 19)   · History of hypertension (V12 59) (Z86 79)    Surgical History    · History of Cholecystectomy   · History of Neck Surgery   · History of Shoulder Surgery    Family History    · No pertinent family history    · Family history of Arthritis (716 90) (M19 90)   · Family history of Cancer (199 1) (C80 1)   · Family history of arthritis (V17 7) (Z82 61)   · Family history of cardiac disorder (V17 49) (Z82 49)   · Family history of diabetes mellitus (V18 0) (Z83 3)   · Family history of hypertension (V17 49) (Z82 49)    Social History    · Exposure to secondhand smoke (V15 89) (Z77 22)   · Never a smoker   · Never a smoker   · No alcohol use   · Retired   · Tea   ·  (V61 07) (Z63 4)    Current Meds   1  Acetaminophen 325 MG Oral Tablet; Therapy: ([id number]) to Recorded   2  Bystolic 10 MG Oral Tablet; Therapy: 31RTI7290 to (Evaluate:07Hlt2850) Recorded   3  Bystolic 5 MG Oral Tablet; Therapy: 95FLU1228 to (Evaluate:14Jvk7315) Recorded   4  Colesevelam HCl 625 MG TABS; Therapy: ([id number]) to Recorded   5  Ibandronate Sodium 150 MG Oral Tablet; Therapy: (Elray Flicker) to Recorded   6  Lidocaine 5 % External Ointment; Therapy: (Elray Flicker) to Recorded   7  Pantoprazole Sodium 40 MG Oral Tablet Delayed Release; Therapy: (AZVGTJKE:02KSG4338) to Recorded   8  TraMADol HCl - 50 MG Oral Tablet; Therapy: (Elray Flicker) to Recorded   9  Warfarin Sodium 1 MG Oral Tablet; Therapy: 42Jtu8396 to (Evaluate:08Fnf7750) Recorded   10  Warfarin Sodium 2 MG Oral Tablet; Therapy: 16MAJ6859 to (Evaluate:98Xmy7534) Recorded    Allergies    1   Codeine Derivatives   2  cortisone   3  Penicillins    Vitals   Recorded: 36UAQ8827 02:43PM   Temperature 98 F   Heart Rate 69   Respiration 16   Systolic 991   Diastolic 82   Height 5 ft    Weight 106 lb 3 oz   BMI Calculated 20 74   BSA Calculated 1 43   O2 Saturation 98     Physical Exam    Constitutional   General appearance: No acute distress, well appearing and well nourished  Thin female, no respiratory distress  Eyes   Conjunctiva and lids: No swelling, erythema or discharge  Pupils and irises: Equal, round and reactive to light  Ears, Nose, Mouth, and Throat   External inspection of ears and nose: Normal     Nasal mucosa, septum, and turbinates: Normal without edema or erythema  Moist, pink, upper and lower dentures, no exudate  Pulmonary Decreased breath sounds in right upper lung field, few scattered rhonchi  Cardiovascular   Palpation of heart: Normal PMI, no thrills  Auscultation of heart: Normal rate and rhythm, normal S1 and S2, without murmurs  Examination of extremities for edema and/or varicosities: Normal     Carotid pulses: Normal     Abdomen   Abdomen: Non-tender, no masses  Liver and spleen: No hepatomegaly or splenomegaly  Lymphatic   Palpation of lymph nodes in neck: Abnormal   + Right supra-clavicular adenopathy, no cervical adenopathy bilaterally, no axillary or inguinal adenopathy bilaterally  Musculoskeletal   Gait and station: Normal     Digits and nails: Normal without clubbing or cyanosis  Inspection/palpation of joints, bones, and muscles: Abnormal   Significant decreased range of motion and muscle strength in right shoulder  Skin   Skin and subcutaneous tissue: Normal without rashes or lesions  Neurologic   Cranial nerves: Cranial nerves 2-12 intact  Reflexes: 2+ and symmetric  Sensation: No sensory loss      Psychiatric   Orientation to person, place, and time: Normal     Mood and affect: Normal          Results/Data    Results   Pathology 12/29/17 right supraclavicular lymph node core biopsy demonstrated metastatic adenocarcinoma compatible with a pulmonary origin  Immune stains demonstrated the lesion to be positive for TTF-1 and CK 7  Radiology 11/29/17 CAT scan of the chest and abdomen/pelvis demonstrated a right upper lobe spiculated mass, 2 3 x 1 9 cm  Patient may have postobstructive pneumonitis  Patient had mild pulmonary emphysema and biapical pleural parenchymal scarring  Right hilar adenopathy was present  Patient had bilateral supra-clavicular adenopathy  There were no findings suggestive of metastatic disease in the abdomen or pelvis  No destructive osseous lesions were commented on  Lab Results 8/31/17 WBC = 6 1 hemoglobin = 12 2 hematocrit = 36 6 platelet = 734  Assessment    1  Right shoulder pain (719 41) (M25 511)   2  Bronchogenic carcinoma (162 9) (C34 90)    Plan  Bronchogenic carcinoma    · Follow-up visit in 3 weeks Evaluation and Treatment  Follow-up  Status: Hold For -  Scheduling  Requested for: [id number]   Ordered; For: Bronchogenic carcinoma; Ordered By: Gabo GARCIA Performed:  Due: 64YCE3085   · 1 - Kat Morris MD, [facility]  *  Status: Active  Requested  for: 41IIP8870 03:00PM   Ordered; For: Bronchogenic carcinoma; Ordered By: Tisha Kerns Performed:  Due: 30LUT8188; Last Updated By: Georgena Siemens; 1/11/2018 3:32:12 PM  Care Summary provided  : Yes  Right shoulder pain    · TraMADol HCl - 50 MG Oral Tablet; TAKE 1 TABLET EVERY 6 HOURS AS  NEEDED FOR PAIN   Rx By: Gabo GARCIA; Dispense: 23 Days ; #:90 Tablet; Refill: 1; For: Right shoulder pain; MAGALY = N; Print Rx; Last Updated By: Autumn Mancera; 1/11/2018 3:36:34 PM    Discussion/Summary    71-year-old female recently diagnosed with right upper lobe bronchogenic carcinoma  Patient never smoked but was exposed to secondhand smoke and worked in a blasting factory  AJCC stage at this moment is T1b N3 M0 = IIIB   Performance at this time is quite good  Issues:    1 Non-small cell lung carcinoma  As above, patient feels relatively well from a lung cancer standpoint  PET/CT is pending  We discussed the fact that there is no additional tissue for testing  Patient consents to another biopsy  I have consulted Dr Amanda Solorzano  EGFR, ALK, ROS1 and PDL 1 expression needs to be tested for  As previously discussed, patient refuses any form of chemotherapy or radiation therapy for the bronchogenic carcinoma  2 Right upper extremity weakness  Patient recently underwent right shoulder surgery  Mrs Baez has a follow-up visit with Dr Alda Willson soon  As above, PET/CT is pending  3 Pain control  Patient apparently has had issues with other medications in the past  We discussed options  Patient is to try Ultram 50 mg every 6 hours as needed  Mrs Baez is to return in 3 weeks but this may change if the results are not available  Patient knows to call if she has any other oncology questions or concerns  Carefully review your medication list and verify that the list is accurate and up-to-date  Please call the hematology/oncology office if there are medications missing from the list, medications on the list that you are not currently taking or if there is a dosage or instruction that is different from how you're taking a medication  Thank you very much for allowing me to participate in the care of this patient  If you have any questions, please do not hesitate to contact me        Signatures   Electronically signed by : Andrea White MD; Jan 11 2018  3:40PM EST                       (Author)

## 2018-03-02 ENCOUNTER — TELEPHONE (OUTPATIENT)
Dept: HEMATOLOGY ONCOLOGY | Facility: MEDICAL CENTER | Age: 78
End: 2018-03-02

## 2018-03-05 NOTE — TELEPHONE ENCOUNTER
Please check my last note  There were 2 biopsies  The 1st biopsy in December 2017 demonstrated adenocarcinoma  This was the limited tissue bx  I subsequently sent the patient to surgery -surgeon did a right supraclavicular lymph node biopsy on January 24, 2018  For this reason, I believe there should be very sufficient tissue for PDL1 expression, EGFR, ALK, Ros1 etc   Please verify this -patient has been waiting over a month for these results

## 2018-03-05 NOTE — TELEPHONE ENCOUNTER
As per your last note patient did not want to be treated with chemotherapy, but would take a pill or consider immunotherapy  Molecular testing unable to be performed  Pt scheduled for f/u with you on 3/21/18

## 2018-03-06 NOTE — TELEPHONE ENCOUNTER
Dr Hernando Michael spoke directly to pathology department  They will review and obtain appropriate sample from last bx

## 2018-03-16 ENCOUNTER — OFFICE VISIT (OUTPATIENT)
Dept: HEMATOLOGY ONCOLOGY | Facility: MEDICAL CENTER | Age: 78
End: 2018-03-16
Payer: MEDICARE

## 2018-03-16 VITALS
TEMPERATURE: 97.5 F | BODY MASS INDEX: 20.81 KG/M2 | SYSTOLIC BLOOD PRESSURE: 130 MMHG | DIASTOLIC BLOOD PRESSURE: 80 MMHG | WEIGHT: 106 LBS | RESPIRATION RATE: 16 BRPM | HEIGHT: 60 IN | HEART RATE: 82 BPM

## 2018-03-16 DIAGNOSIS — C34.92 NON-SMALL CELL CARCINOMA OF LEFT LUNG (HCC): Primary | ICD-10-CM

## 2018-03-16 PROCEDURE — 99213 OFFICE O/P EST LOW 20 MIN: CPT | Performed by: INTERNAL MEDICINE

## 2018-03-16 NOTE — PROGRESS NOTES
Eneida Lozano  1940  [address] [facility] [place] [id number]    DISCUSSION  SUMMARY:    59-year-old female recently diagnosed with right upper lobe bronchogenic carcinoma  PET/CT demonstrated M1/stage IV disease  Patient never smoked but was exposed to secondhand smoke and worked in a blasting factory  Issues:      1  Non-small cell lung carcinoma  As above, patient feels relatively well from a lung cancer standpoint  We previously discussed at length the PET/CT findings and the fact that patient has metastatic/usually incurable disease  As discussed previously, Mrs José Manuel Delgado does not wish to be treated with chemotherapy or radiotherapy (patient's daughter-in-law apparently had a very difficult time with chemotherapy while being treated for [facility])  ALK, PDL1 and ROS1 were negative for abnormalities  EGFR results are still pending - patient will be called back if she is a candidate for treatment  Otherwise, Mrs José Manuel Delgado is to return in 2 months  Patient has completed the FIVE WISHES -form is on the chart  2  Right upper extremity weakness/pain  Patient underwent right shoulder surgery last fall  Mrs Baez missed he orthopedics appointment because of the weather  I was able to speak to Dr Apple Schmidt previously  He agreed that the right shoulder/humerus pain is associated with the surgery from last fall 2017  Patient apparently did not follow-up with physical therapy  He had seen the patient recently - no other orthopedic manipulations are needed at this time      3  Pain control  Patient apparently has had issues with other medications in the past  We we discussed options  Patient is to continue to use the Ultram if Tylenol is not effective  Patient will call if there are additional pain control issues  4  Bone metastases  Patient denies any pain in the areas that were abnormal on PET/CT    Patient does not wish to begin a rank Ligand inhibitor or bisphosphonate  Patient knows to call the hematology/oncology office if she has any other questions or concerns  Carefully review your medication list and verify that the list is accurate and up-to-date  Please call the hematology/oncology office if there are medications missing from the list, medications on the list that you are not currently taking or if there is a dosage or instruction that is different from how you're taking that medication  Patient goals and areas of care:  Monitor for any signs of lung cancer progression  Patient is able to self-care   _____________________________________________________________________________________    Chief Complaint   Patient presents with    Follow-up     Recent diagnosis of lung cancer     Advance Care Planning/Advance Directives:  Discussed today - patient needs to further discuss with family members at home  History of Present Illness:    49-year-old female previously referred for the above  Mrs Baez had right shoulder rotator cuff repair in September 2017  Postoperatively, during the physical therapy phase, patient was found to have a right supraclavicular mass  Patient has been seen by surgery and pulmonary  Recent biopsy demonstrated adenocarcinoma  Additional workup demonstrated metastatic disease  On the last office visit, we discussed options  Patient stated that she would not allow chemotherapy but would be interested anemia therapy or possibly oral treatment if she had a mutation  Genetic testing was recently re-submitted to the pathology department - results are listed below  Mrs Baez states feeling about the same as before  Patient still has right shoulder pain, same as before  Range of motion is decreased but same as before  No pain control issues with any other areas in the body  No shortness of breath or dyspnea on exertion  No chest pain or pressure  No cough, sputum or hemoptysis   No fevers, chills or sweats  Appetite is good, weight is stable  No other significant bodyaches  No other GI,  or GYN issues  Patient states that her quality of life at this time is pretty good  Review of Systems   Constitutional: Negative  HENT: Negative  Eyes: Negative  Respiratory: Negative  Cardiovascular: Negative  Gastrointestinal: Negative  Endocrine: Negative  Genitourinary: Negative  Musculoskeletal: Positive for arthralgias  Right shoulder and right humerus pain   Skin: Negative  Allergic/Immunologic: Negative  Neurological: Negative  Hematological: Negative  Psychiatric/Behavioral: Negative  All other systems reviewed and are negative       Patient Active Problem List   Diagnosis    Anxiety    Angular stomatitis    Chest pain    History of pulmonary embolism    Nodule of right lung    Complete tear of right rotator cuff    Non-small cell carcinoma of left lung (HCC)    Subacromial bursitis of right shoulder joint     Past Medical History:   Diagnosis Date    Arthritis     Atrial fibrillation ([address] )     Borderline diabetes     watches diet    Cancer ([facility] [address] )     lymphnodes, lung, stomach, possible cervical, bone    Diverticulitis     Fracture, foot 12/30/2016    right foot-no surgical intervention    Full dentures     GERD (gastroesophageal reflux disease)     Hearing aid worn     both ears    Hearing decreased, bilateral     has hearing    History of anemia 2016    transfused with 2 units-unknown etiology    History of transfusion 2016    2 units    Hypertension     Osteoporosis     Pulmonary embolism (HCC)     x2 episodes-on warfarin    Sciatica     Tachycardia     Wears glasses     for reading     Past Surgical History:   Procedure Laterality Date    CARPAL TUNNEL RELEASE Right     CATARACT EXTRACTION Bilateral     CHOLECYSTECTOMY      open    COLONOSCOPY      EGD AND COLONOSCOPY N/A 7/1/2016    Procedure: EGD AND COLONOSCOPY;  Surgeon: Demetrio Lazo Shawna Dailey MD;  Location: [facility] GI LAB; Service:     HYSTERECTOMY      one ovary remains    LYMPH NODE BIOPSY Right 1/24/2018    Procedure: EXCISIONAL BIOPSY SUPRACLAVICULAR LYMPH NODE;  Surgeon: Nan Larsen MD;  Location: [address] [place] [address];  Service: General    NECK SURGERY      bone spur with bone transplant  2nd surgery "pinched nerve"    ME SHLDR ARTHROSCOP,SURG,W/ROTAT CUFF REPR Right 9/11/2017    Procedure: SHOULDER ARTHROSCOPY WITH ROTATOR CUFF REPAIR, BICEPS TENOTOMY AND SUBACROMIAL DECOMPRESSION;  Surgeon: Haydee Sanchez MD;  Location: [facility] MAIN OR;  Service: Orthopedics    ROTATOR CUFF REPAIR Left 01/08/2015     Family History   Problem Relation Age of Onset    Diabetes Mother     Heart attack Mother     Heart attack Father     Diabetes Sister      insulin dependent    Heart disease Sister      CABG    Diabetes Brother      insulin dependent    Heart disease Brother      CABG     Social History     Social History    Marital status: Single     Spouse name: N/A    Number of children: N/A    Years of education: N/A     Occupational History    Not on file  Social History Main Topics    Smoking status: Never Smoker    Smokeless tobacco: Never Used    Alcohol use No    Drug use: No    Sexual activity: No     Other Topics Concern    Not on file     Social History Narrative    No narrative on file       Current Outpatient Prescriptions:     acetaminophen (TYLENOL) 325 mg tablet, Take 2 tablets (650 mg total) by mouth every 6 (six) hours as needed for mild pain, headaches or fever  , Disp: 30 tablet, Rfl: 0    albuterol (PROVENTIL HFA,VENTOLIN HFA) 90 mcg/act inhaler, Inhale 1-2 puffs every 6 (six) hours as needed for wheezing, Disp: 1 Inhaler, Rfl: 0    Calcium Carb-Cholecalciferol (CALCIUM 600-D PO), Take by mouth Monday thru Friday, Disp: , Rfl:     Cholecalciferol (VITAMIN D PO), Take 3,000 Units by mouth Monday thru Friday, Disp: , Rfl:     colesevelam (WELCHOL) 625 mg tablet, Take 1,875 mg by mouth 2 (two) times a day with meals  , Disp: , Rfl:     GLUCOSAMINE SULFATE PO, Take 600 mg by mouth 2 (two) times a day, Disp: , Rfl:     Lidocaine (LIDODERM EX), Apply topically 2 (two) times a day Apply cream to right shoulder and arm, Disp: , Rfl:     lidocaine (XYLOCAINE) 5 % ointment, Apply topically, Disp: , Rfl:     nebivolol (BYSTOLIC) 10 mg tablet, Take 10 mg by mouth every morning  , Disp: , Rfl:     nebivolol (BYSTOLIC) 5 mg tablet, Take 5 mg by mouth daily with lunch  , Disp: , Rfl:     pantoprazole (PROTONIX) 40 mg tablet, Take 40 mg by mouth every morning  , Disp: , Rfl:     risedronate (ACTONEL) 150 MG tablet, Take 150 mg by mouth every 30 (thirty) days with water on empty stomach, nothing by mouth or lie down for next 30 minutes  , Disp: , Rfl:     warfarin (COUMADIN) 3 mg tablet, Take 3 mg by mouth daily at bedtime Last dose 1/18/18 , Disp: , Rfl:     Allergies   Allergen Reactions    Codeine Other (See Comments)     Patient experience chest pain    Cortisone Rash     Severe rash and redness    Pneumovax [Pneumococcal Polysaccharide Vaccine] Swelling     Swelling, red rash and pain at injection site    Penicillins     Asa [Aspirin] GI Intolerance       Vitals:    03/16/18 1034   BP: 130/80   Pulse: 82   Resp: 16   Temp: 97 5 °F (36 4 °C)     Physical Exam   Constitutional: She is oriented to person, place, and time  She appears well-developed and well-nourished  HENT:   Head: Normocephalic and atraumatic  Right Ear: External ear normal    Left Ear: External ear normal    Nose: Nose normal    Mouth/Throat: Oropharynx is clear and moist    Eyes: Conjunctivae and EOM are normal  Pupils are equal, round, and reactive to light  Neck: Normal range of motion  Neck supple  Cardiovascular: Normal rate, regular rhythm, normal heart sounds and intact distal pulses      Pulmonary/Chest: Effort normal and breath sounds normal    Rhonchi in right upper lung field   Abdominal: Soft  Bowel sounds are normal    Musculoskeletal: She exhibits tenderness  + tenderness in right shoulder and right upper humerus, decreased range of motion, no swelling or inflammation   Neurological: She is alert and oriented to person, place, and time  She has normal reflexes  Skin: Skin is warm  Skin is warm, moist, relatively good color, no petechiae or ecchymoses   Psychiatric: She has a normal mood and affect  Her behavior is normal  Judgment and thought content normal      Performance Status: 1 - Symptomatic but completely ambulatory    Labs:    8/31/17 WBC = 6 1 hemoglobin = 12 2 hematocrit = 36 6 platelet = 108  Imaging    1/19/18 PET/CT    1  Enlarging right upper lobe hypermetabolic mass compatible with primary bronchogenic carcinoma of the lung  2   There is evidence of hypermetabolic adenopathy at the neck base, right supraclavicular fossa, mediastinum, and kimmie bilaterally  3   Osseous metastases detected in the right anterior sacrum and posterior right iliac crest   4   No soft tissue metastases detected within the abdomen or pelvis    11/29/17 CAT scan of the chest and abdomen/pelvis demonstrated a right upper lobe spiculated mass, 2 3 x 1 9 cm  Patient may have postobstructive pneumonitis  Patient had mild pulmonary emphysema and biapical pleural parenchymal scarring  Right hilar adenopathy was present  Patient had bilateral supra-clavicular adenopathy  There were no findings suggestive of metastatic disease in the abdomen or pelvis  No destructive osseous lesions were commented on       Pathology    01/24/2018 supplemental pathology report: PDL1 expression = 0% = no expression, no evidence of ALK or ROS1 gene rearrangement    Surgical Pathology Report                         Case: [id number]                                    Authorizing Provider: Christopher Michele MD       Collected:           01/24/2018 1002               Ordering Location:     [address] [place] [address] Received:            01/24/2018 1024                                      Operating Room                                                                Pathologist:           Sweta Carlson MD                                                         Intraop:               Sweta Carlson MD                                                         Specimen:    Lymph Node, RT supraclavicular lymph node                                                  Final Diagnosis   A  Lymph node, right supraclavicular, excision:  -  Metastatic adenocarcinoma consistent with pulmonary origin  -  Immunohistochemical stains performed with appropriate controls show the tumor to be positive for TTF-1, Napsin, and CK7 and negative for CK 20, supporting the diagnosis  -  Very scant residual lymphoid tissue is present with reactive features  Electronically signed by Sweta Carlson MD on 1/26/2018 at  1:07 PM       12/29/17 right supraclavicular lymph node core biopsy demonstrated metastatic adenocarcinoma compatible with a pulmonary origin  Immune stains demonstrated the lesion to be positive for TTF-1 and CK 7

## 2018-03-19 LAB — SCAN RESULT: NORMAL

## 2018-05-16 ENCOUNTER — OFFICE VISIT (OUTPATIENT)
Dept: HEMATOLOGY ONCOLOGY | Facility: MEDICAL CENTER | Age: 78
End: 2018-05-16
Payer: MEDICARE

## 2018-05-16 VITALS
OXYGEN SATURATION: 99 % | RESPIRATION RATE: 18 BRPM | WEIGHT: 106 LBS | BODY MASS INDEX: 20.81 KG/M2 | SYSTOLIC BLOOD PRESSURE: 132 MMHG | HEART RATE: 91 BPM | HEIGHT: 60 IN | TEMPERATURE: 98 F | DIASTOLIC BLOOD PRESSURE: 70 MMHG

## 2018-05-16 DIAGNOSIS — C34.92 NON-SMALL CELL CARCINOMA OF LEFT LUNG (HCC): Primary | ICD-10-CM

## 2018-05-16 PROCEDURE — 99214 OFFICE O/P EST MOD 30 MIN: CPT | Performed by: INTERNAL MEDICINE

## 2018-05-16 RX ORDER — ONDANSETRON 4 MG/1
TABLET, FILM COATED ORAL
Refills: 1 | COMMUNITY
Start: 2018-03-19

## 2018-05-16 NOTE — PROGRESS NOTES
Nevaeh Truong  1940  [address] [facility] [place] [id number]    DISCUSSION  SUMMARY:    70-year-old female recently diagnosed with right upper lobe bronchogenic carcinoma  PET/CT demonstrated M1/stage IV disease  Patient never smoked but was exposed to secondhand smoke and worked in a blasting factory  Issues:      1  Non-small cell lung carcinoma  As above, patient feels relatively well from a lung cancer standpoint  We previously discussed at length the PET/CT findings and the fact that patient has metastatic/usually incurable disease  As discussed previously, Mrs Vitor Burton does not wish to be treated with chemotherapy or radiotherapy (patient's daughter-in-law apparently had a very difficult time with chemotherapy while being treated for [facility])  ALK, EGFR, PDL1 and ROS1 were negative for abnormalities  The plan is continued observation  Mrs Baez is to return in 3 months  Patient has completed the FIVE WISHES - form is on the chart  2  Right upper extremity weakness/pain  Patient underwent right shoulder surgery last fall  Mrs Baez missed he orthopedics appointment because of the weather  I was able to speak to Dr Zayda Jackson previously  He agreed that the right shoulder/humerus pain is associated with the surgery from last fall 2017  Patient apparently did not follow-up with physical therapy  Since last office visit, the pain is less/better  Surveillance is ongoing      3  Pain control  Patient apparently has had issues with other medications in the past  We we discussed options  Patient is to continue to use the Ultram if Tylenol is not effective  Patient will call if there are additional pain control issues  4  Bone metastases  Patient denies any pain in the areas that were abnormal on PET/CT  Patient does not wish to begin a rank Ligand inhibitor or bisphosphonate      Patient knows to call the hematology/oncology office if she has any other questions or concerns  Carefully review your medication list and verify that the list is accurate and up-to-date  Please call the hematology/oncology office if there are medications missing from the list, medications on the list that you are not currently taking or if there is a dosage or instruction that is different from how you're taking that medication  Patient goals and areas of care:  Monitor for any signs of lung cancer progression  Patient is able to self-care   _____________________________________________________________________________________    Chief Complaint   Patient presents with    Follow-up     Metastatic non-small cell lung carcinoma on surveillance     Advance Care Planning/Advance Directives:  Discussed today - patient needs to further discuss with family members at home  History of Present Illness:    26-year-old female previously referred for the above  Mrs Baez had right shoulder rotator cuff repair in September 2017  Postoperatively, during the physical therapy phase, patient was found to have a right supraclavicular mass  Patient has been seen by surgery and pulmonary  Recent biopsy demonstrated adenocarcinoma  Additional workup demonstrated metastatic disease  On a previous office visit, we discussed options  Patient stated that she would not allow chemotherapy but would be interested immuno therapy or possibly an oral treatment if she had a mutation  Genetic testing was recently re-submitted to the pathology department - results are listed below  Mrs Baez states feeling well  No pain control issues, right shoulder pain is basically gone  Range of motion is close to normal  No shortness of breath or dyspnea on exertion  No chest pain or pressure  No cough, sputum or hemoptysis  No fevers, chills or sweats  Appetite is good, weight is stable  No other significant bodyaches  No other GI,  or GYN issues   Patient states that she is happy with her present quality of life  Review of Systems   Constitutional: Negative  HENT: Negative  Eyes: Negative  Respiratory: Negative  Cardiovascular: Negative  Gastrointestinal: Negative  Endocrine: Negative  Genitourinary: Negative  Musculoskeletal: Positive for arthralgias  Skin: Negative  Allergic/Immunologic: Negative  Neurological: Negative  Hematological: Negative  Psychiatric/Behavioral: Negative  All other systems reviewed and are negative  Patient Active Problem List   Diagnosis    Anxiety    Angular stomatitis    Chest pain    History of pulmonary embolism    Nodule of right lung    Complete tear of right rotator cuff    Non-small cell carcinoma of left lung (HCC)    Subacromial bursitis of right shoulder joint     Past Medical History:   Diagnosis Date    Arthritis     Atrial fibrillation ([facility] [address] )     Borderline diabetes     watches diet    Cancer ([facility] [address] )     lymphnodes, lung, stomach, possible cervical, bone    Diverticulitis     Fracture, foot 12/30/2016    right foot-no surgical intervention    Full dentures     GERD (gastroesophageal reflux disease)     Hearing aid worn     both ears    Hearing decreased, bilateral     has hearing    History of anemia 2016    transfused with 2 units-unknown etiology    History of transfusion 2016    2 units    Hypertension     Osteoporosis     Pulmonary embolism (HCC)     x2 episodes-on warfarin    Sciatica     Tachycardia     Wears glasses     for reading     Past Surgical History:   Procedure Laterality Date    CARPAL TUNNEL RELEASE Right     CATARACT EXTRACTION Bilateral     CHOLECYSTECTOMY      open    COLONOSCOPY      EGD AND COLONOSCOPY N/A 7/1/2016    Procedure: EGD AND COLONOSCOPY;  Surgeon: Rosy Nye MD;  Location: [facility];   Service:     HYSTERECTOMY      one ovary remains    LYMPH NODE BIOPSY Right 1/24/2018    Procedure: EXCISIONAL BIOPSY SUPRACLAVICULAR LYMPH NODE;  Surgeon: Michelle Esparza MD;  Location: [address] [place] [address];  Service: General    NECK SURGERY      bone spur with bone transplant  2nd surgery "pinched nerve"    DC SHLDR ARTHROSCOP,SURG,W/ROTAT CUFF REPR Right 9/11/2017    Procedure: SHOULDER ARTHROSCOPY WITH ROTATOR CUFF REPAIR, BICEPS TENOTOMY AND SUBACROMIAL DECOMPRESSION;  Surgeon: Dalia Campos MD;  Location: [place] [address] MAIN OR;  Service: Orthopedics    ROTATOR CUFF REPAIR Left 01/08/2015     Family History   Problem Relation Age of Onset    Diabetes Mother     Heart attack Mother     Heart attack Father     Diabetes Sister      insulin dependent    Heart disease Sister      CABG    Diabetes Brother      insulin dependent    Heart disease Brother      CABG     Social History     Social History    Marital status: Single     Spouse name: N/A    Number of children: N/A    Years of education: N/A     Occupational History    Not on file  Social History Main Topics    Smoking status: Never Smoker    Smokeless tobacco: Never Used    Alcohol use No    Drug use: No    Sexual activity: No     Other Topics Concern    Not on file     Social History Narrative    No narrative on file       Current Outpatient Prescriptions:     acetaminophen (TYLENOL) 325 mg tablet, Take 2 tablets (650 mg total) by mouth every 6 (six) hours as needed for mild pain, headaches or fever  , Disp: 30 tablet, Rfl: 0    albuterol (PROVENTIL HFA,VENTOLIN HFA) 90 mcg/act inhaler, Inhale 1-2 puffs every 6 (six) hours as needed for wheezing, Disp: 1 Inhaler, Rfl: 0    Calcium Carb-Cholecalciferol (CALCIUM 600-D PO), Take by mouth Monday thru Friday, Disp: , Rfl:     Cholecalciferol (VITAMIN D PO), Take 3,000 Units by mouth Monday thru Friday, Disp: , Rfl:     colesevelam (WELCHOL) 625 mg tablet, Take 1,875 mg by mouth 2 (two) times a day with meals  , Disp: , Rfl:     GLUCOSAMINE SULFATE PO, Take 600 mg by mouth 2 (two) times a day, Disp: , Rfl:    Lidocaine (LIDODERM EX), Apply topically 2 (two) times a day Apply cream to right shoulder and arm, Disp: , Rfl:     lidocaine (XYLOCAINE) 5 % ointment, Apply topically, Disp: , Rfl:     nebivolol (BYSTOLIC) 10 mg tablet, Take 10 mg by mouth every morning  , Disp: , Rfl:     nebivolol (BYSTOLIC) 5 mg tablet, Take 5 mg by mouth daily with lunch  , Disp: , Rfl:     pantoprazole (PROTONIX) 40 mg tablet, Take 40 mg by mouth every morning  , Disp: , Rfl:     risedronate (ACTONEL) 150 MG tablet, Take 150 mg by mouth every 30 (thirty) days with water on empty stomach, nothing by mouth or lie down for next 30 minutes  , Disp: , Rfl:     warfarin (COUMADIN) 3 mg tablet, Take 3 mg by mouth daily at bedtime Last dose 1/18/18 , Disp: , Rfl:     ondansetron (ZOFRAN) 4 mg tablet, TAKE 1 TABLET BY MOUTH TWICE A DAY AS NEEDED FOR NAUSEA, Disp: , Rfl: 1    Allergies   Allergen Reactions    Codeine Other (See Comments)     Patient experience chest pain    Cortisone Rash     Severe rash and redness    Pneumovax [Pneumococcal Polysaccharide Vaccine] Swelling     Swelling, red rash and pain at injection site    Penicillins     Asa [Aspirin] GI Intolerance       Vitals:    05/16/18 1510   BP: 132/70   Pulse: 91   Resp: 18   Temp: 98 °F (36 7 °C)   SpO2: 99%     Physical Exam   Constitutional: She is oriented to person, place, and time  She appears well-developed and well-nourished  HENT:   Head: Normocephalic and atraumatic  Right Ear: External ear normal    Left Ear: External ear normal    Nose: Nose normal    Mouth/Throat: Oropharynx is clear and moist    Eyes: Conjunctivae and EOM are normal  Pupils are equal, round, and reactive to light  Neck: Normal range of motion  Neck supple  Cardiovascular: Normal rate, regular rhythm, normal heart sounds and intact distal pulses  Pulmonary/Chest: Effort normal and breath sounds normal    Rhonchi in right upper lung field   Abdominal: Soft   Bowel sounds are normal  Musculoskeletal: She exhibits no tenderness  Nontender, good range of motion in right shoulder    Neurological: She is alert and oriented to person, place, and time  She has normal reflexes  Skin: Skin is warm  Skin is warm, moist, relatively good color, no petechiae or ecchymoses   Psychiatric: She has a normal mood and affect  Her behavior is normal  Judgment and thought content normal      Performance Status: 1 - Symptomatic but completely ambulatory    Labs:    8/31/17 WBC = 6 1 hemoglobin = 12 2 hematocrit = 36 6 platelet = 575  Imaging    1/19/18 PET/CT    1  Enlarging right upper lobe hypermetabolic mass compatible with primary bronchogenic carcinoma of the lung  2   There is evidence of hypermetabolic adenopathy at the neck base, right supraclavicular fossa, mediastinum, and kimmie bilaterally  3   Osseous metastases detected in the right anterior sacrum and posterior right iliac crest   4   No soft tissue metastases detected within the abdomen or pelvis    11/29/17 CAT scan of the chest and abdomen/pelvis demonstrated a right upper lobe spiculated mass, 2 3 x 1 9 cm  Patient may have postobstructive pneumonitis  Patient had mild pulmonary emphysema and biapical pleural parenchymal scarring  Right hilar adenopathy was present  Patient had bilateral supra-clavicular adenopathy  There were no findings suggestive of metastatic disease in the abdomen or pelvis  No destructive osseous lesions were commented on       Pathology    03/06/2018 EGFR = new mutations identified, no evidence of ALK rearrangement or deletion, no evidence of ROS1 rearrangement  01/24/2018 supplemental pathology report: PDL1 expression = 0% = no expression      Surgical Pathology Report                         Case: [id number]                                    Authorizing Provider: Castro Waterman MD       Collected:           01/24/2018 1002               Ordering Location:     [address] [place] [address] Received:            01/24/2018 [facility]                                      Operating Room                                                                Pathologist:           Demetrius Coburn MD                                                         Intraop:               Demetrius Coburn MD                                                         Specimen:    Lymph Node, RT supraclavicular lymph node                                                  Final Diagnosis   A  Lymph node, right supraclavicular, excision:  -  Metastatic adenocarcinoma consistent with pulmonary origin  -  Immunohistochemical stains performed with appropriate controls show the tumor to be positive for TTF-1, Napsin, and CK7 and negative for CK 20, supporting the diagnosis  -  Very scant residual lymphoid tissue is present with reactive features  Electronically signed by Demetrius Coburn MD on 1/26/2018 at  1:07 PM       12/29/17 right supraclavicular lymph node core biopsy demonstrated metastatic adenocarcinoma compatible with a pulmonary origin  Immune stains demonstrated the lesion to be positive for TTF-1 and CK 7

## 2018-05-19 ENCOUNTER — HOSPITAL ENCOUNTER (INPATIENT)
Facility: HOSPITAL | Age: 78
LOS: 3 days | Discharge: HOME | DRG: 392 | End: 2018-05-23
Attending: EMERGENCY MEDICINE | Admitting: INTERNAL MEDICINE
Payer: MEDICARE

## 2018-05-19 ENCOUNTER — APPOINTMENT (EMERGENCY)
Dept: RADIOLOGY | Facility: HOSPITAL | Age: 78
DRG: 392 | End: 2018-05-19
Payer: MEDICARE

## 2018-05-19 ENCOUNTER — APPOINTMENT (OUTPATIENT)
Dept: RADIOLOGY | Facility: HOSPITAL | Age: 78
DRG: 392 | End: 2018-05-19
Payer: MEDICARE

## 2018-05-19 DIAGNOSIS — K52.9 GASTROENTERITIS: ICD-10-CM

## 2018-05-19 DIAGNOSIS — R07.9 CHEST PAIN: Primary | ICD-10-CM

## 2018-05-19 DIAGNOSIS — I48.0 PAROXYSMAL ATRIAL FIBRILLATION (HCC): ICD-10-CM

## 2018-05-19 PROBLEM — R73.03 BORDERLINE DIABETES: Status: ACTIVE | Noted: 2018-05-19

## 2018-05-19 PROBLEM — I48.91 ATRIAL FIBRILLATION (HCC): Status: ACTIVE | Noted: 2018-05-19

## 2018-05-19 LAB
ALBUMIN SERPL BCP-MCNC: 3.9 G/DL (ref 3.5–5)
ALP SERPL-CCNC: 46 U/L (ref 46–116)
ALT SERPL W P-5'-P-CCNC: 30 U/L (ref 12–78)
ANION GAP SERPL CALCULATED.3IONS-SCNC: 16 MMOL/L (ref 4–13)
APTT PPP: 28 SECONDS (ref 24–33)
AST SERPL W P-5'-P-CCNC: 27 U/L (ref 5–45)
ATRIAL RATE: 85 BPM
BASOPHILS # BLD AUTO: 0.04 THOUSANDS/ΜL (ref 0–0.1)
BASOPHILS NFR BLD AUTO: 1 % (ref 0–1)
BILIRUB SERPL-MCNC: 0.3 MG/DL (ref 0.2–1)
BUN SERPL-MCNC: 10 MG/DL (ref 5–25)
CALCIUM SERPL-MCNC: 8.7 MG/DL (ref 8.3–10.1)
CHLORIDE SERPL-SCNC: 100 MMOL/L (ref 100–108)
CHOLEST SERPL-MCNC: 160 MG/DL (ref 50–200)
CO2 SERPL-SCNC: 21 MMOL/L (ref 21–32)
CREAT SERPL-MCNC: 0.99 MG/DL (ref 0.6–1.3)
EOSINOPHIL # BLD AUTO: 0.01 THOUSAND/ΜL (ref 0–0.61)
EOSINOPHIL NFR BLD AUTO: 0 % (ref 0–6)
ERYTHROCYTE [DISTWIDTH] IN BLOOD BY AUTOMATED COUNT: 14 % (ref 11.6–15.1)
GFR SERPL CREATININE-BSD FRML MDRD: 55 ML/MIN/1.73SQ M
GLUCOSE SERPL-MCNC: 109 MG/DL (ref 65–140)
HCT VFR BLD AUTO: 35 % (ref 34.8–46.1)
HDLC SERPL-MCNC: 52 MG/DL (ref 40–60)
HGB BLD-MCNC: 10.6 G/DL (ref 11.5–15.4)
IMM GRANULOCYTES # BLD AUTO: 0.03 THOUSAND/UL (ref 0–0.2)
IMM GRANULOCYTES NFR BLD AUTO: 1 % (ref 0–2)
INR PPP: 2.41 (ref 0.86–1.16)
LDLC SERPL CALC-MCNC: 74 MG/DL (ref 0–100)
LIPASE SERPL-CCNC: 113 U/L (ref 73–393)
LYMPHOCYTES # BLD AUTO: 1.97 THOUSANDS/ΜL (ref 0.6–4.47)
LYMPHOCYTES NFR BLD AUTO: 30 % (ref 14–44)
MCH RBC QN AUTO: 25.3 PG (ref 26.8–34.3)
MCHC RBC AUTO-ENTMCNC: 30.3 G/DL (ref 31.4–37.4)
MCV RBC AUTO: 84 FL (ref 82–98)
MONOCYTES # BLD AUTO: 0.45 THOUSAND/ΜL (ref 0.17–1.22)
MONOCYTES NFR BLD AUTO: 7 % (ref 4–12)
NEUTROPHILS # BLD AUTO: 3.98 THOUSANDS/ΜL (ref 1.85–7.62)
NEUTS SEG NFR BLD AUTO: 61 % (ref 43–75)
NONHDLC SERPL-MCNC: 108 MG/DL
NRBC BLD AUTO-RTO: 0 /100 WBCS
P AXIS: 36 DEGREES
PLATELET # BLD AUTO: 328 THOUSANDS/UL (ref 149–390)
PMV BLD AUTO: 9.2 FL (ref 8.9–12.7)
POTASSIUM SERPL-SCNC: 4.1 MMOL/L (ref 3.5–5.3)
PR INTERVAL: 152 MS
PROT SERPL-MCNC: 8.3 G/DL (ref 6.4–8.2)
PROTHROMBIN TIME: 25.5 SECONDS (ref 9.4–11.7)
QRS AXIS: 0 DEGREES
QRSD INTERVAL: 78 MS
QT INTERVAL: 400 MS
QTC INTERVAL: 476 MS
RBC # BLD AUTO: 4.19 MILLION/UL (ref 3.81–5.12)
SODIUM SERPL-SCNC: 137 MMOL/L (ref 136–145)
T WAVE AXIS: 13 DEGREES
TRIGL SERPL-MCNC: 170 MG/DL
TROPONIN I SERPL-MCNC: <0.02 NG/ML
VENTRICULAR RATE: 85 BPM
WBC # BLD AUTO: 6.48 THOUSAND/UL (ref 4.31–10.16)

## 2018-05-19 PROCEDURE — 93005 ELECTROCARDIOGRAM TRACING: CPT

## 2018-05-19 PROCEDURE — 85730 THROMBOPLASTIN TIME PARTIAL: CPT | Performed by: EMERGENCY MEDICINE

## 2018-05-19 PROCEDURE — 36415 COLL VENOUS BLD VENIPUNCTURE: CPT | Performed by: EMERGENCY MEDICINE

## 2018-05-19 PROCEDURE — 96361 HYDRATE IV INFUSION ADD-ON: CPT

## 2018-05-19 PROCEDURE — 96374 THER/PROPH/DIAG INJ IV PUSH: CPT

## 2018-05-19 PROCEDURE — 84484 ASSAY OF TROPONIN QUANT: CPT | Performed by: INTERNAL MEDICINE

## 2018-05-19 PROCEDURE — 99285 EMERGENCY DEPT VISIT HI MDM: CPT

## 2018-05-19 PROCEDURE — 99223 1ST HOSP IP/OBS HIGH 75: CPT | Performed by: INTERNAL MEDICINE

## 2018-05-19 PROCEDURE — 80053 COMPREHEN METABOLIC PANEL: CPT | Performed by: EMERGENCY MEDICINE

## 2018-05-19 PROCEDURE — 85025 COMPLETE CBC W/AUTO DIFF WBC: CPT | Performed by: EMERGENCY MEDICINE

## 2018-05-19 PROCEDURE — 85610 PROTHROMBIN TIME: CPT | Performed by: EMERGENCY MEDICINE

## 2018-05-19 PROCEDURE — 93010 ELECTROCARDIOGRAM REPORT: CPT | Performed by: INTERNAL MEDICINE

## 2018-05-19 PROCEDURE — 84484 ASSAY OF TROPONIN QUANT: CPT | Performed by: EMERGENCY MEDICINE

## 2018-05-19 PROCEDURE — 87081 CULTURE SCREEN ONLY: CPT | Performed by: INTERNAL MEDICINE

## 2018-05-19 PROCEDURE — 80061 LIPID PANEL: CPT | Performed by: INTERNAL MEDICINE

## 2018-05-19 PROCEDURE — 83690 ASSAY OF LIPASE: CPT | Performed by: EMERGENCY MEDICINE

## 2018-05-19 PROCEDURE — 74177 CT ABD & PELVIS W/CONTRAST: CPT

## 2018-05-19 PROCEDURE — 71045 X-RAY EXAM CHEST 1 VIEW: CPT

## 2018-05-19 RX ORDER — ONDANSETRON 2 MG/ML
4 INJECTION INTRAMUSCULAR; INTRAVENOUS ONCE
Status: COMPLETED | OUTPATIENT
Start: 2018-05-19 | End: 2018-05-19

## 2018-05-19 RX ORDER — SODIUM CHLORIDE 9 MG/ML
50 INJECTION, SOLUTION INTRAVENOUS CONTINUOUS
Status: DISCONTINUED | OUTPATIENT
Start: 2018-05-19 | End: 2018-05-23 | Stop reason: HOSPADM

## 2018-05-19 RX ORDER — WARFARIN SODIUM 2.5 MG/1
2.5 TABLET ORAL DAILY
Status: DISCONTINUED | OUTPATIENT
Start: 2018-05-19 | End: 2018-05-20

## 2018-05-19 RX ORDER — ACETAMINOPHEN 325 MG/1
650 TABLET ORAL EVERY 6 HOURS PRN
Status: DISCONTINUED | OUTPATIENT
Start: 2018-05-19 | End: 2018-05-23 | Stop reason: HOSPADM

## 2018-05-19 RX ORDER — ONDANSETRON 2 MG/ML
4 INJECTION INTRAMUSCULAR; INTRAVENOUS EVERY 6 HOURS PRN
Status: DISCONTINUED | OUTPATIENT
Start: 2018-05-19 | End: 2018-05-23 | Stop reason: HOSPADM

## 2018-05-19 RX ORDER — ACETAMINOPHEN 325 MG/1
650 TABLET ORAL EVERY 6 HOURS PRN
Status: DISCONTINUED | OUTPATIENT
Start: 2018-05-19 | End: 2018-05-19 | Stop reason: SDUPTHER

## 2018-05-19 RX ORDER — CIPROFLOXACIN 2 MG/ML
400 INJECTION, SOLUTION INTRAVENOUS EVERY 12 HOURS
Status: DISCONTINUED | OUTPATIENT
Start: 2018-05-19 | End: 2018-05-21

## 2018-05-19 RX ORDER — NEBIVOLOL 10 MG/1
10 TABLET ORAL EVERY MORNING
Status: DISCONTINUED | OUTPATIENT
Start: 2018-05-20 | End: 2018-05-23 | Stop reason: HOSPADM

## 2018-05-19 RX ORDER — B-COMPLEX WITH VITAMIN C
1 TABLET ORAL
Status: DISCONTINUED | OUTPATIENT
Start: 2018-05-20 | End: 2018-05-23 | Stop reason: HOSPADM

## 2018-05-19 RX ORDER — CHOLESTYRAMINE LIGHT 4 G/5.7G
4 POWDER, FOR SUSPENSION ORAL 2 TIMES DAILY
Status: DISCONTINUED | OUTPATIENT
Start: 2018-05-19 | End: 2018-05-21

## 2018-05-19 RX ORDER — PANTOPRAZOLE SODIUM 40 MG/1
40 TABLET, DELAYED RELEASE ORAL EVERY MORNING
Status: DISCONTINUED | OUTPATIENT
Start: 2018-05-20 | End: 2018-05-23

## 2018-05-19 RX ORDER — ALBUTEROL SULFATE 90 UG/1
1 AEROSOL, METERED RESPIRATORY (INHALATION) EVERY 6 HOURS PRN
Status: DISCONTINUED | OUTPATIENT
Start: 2018-05-19 | End: 2018-05-23 | Stop reason: HOSPADM

## 2018-05-19 RX ORDER — NEBIVOLOL 5 MG/1
5 TABLET ORAL
Status: DISCONTINUED | OUTPATIENT
Start: 2018-05-19 | End: 2018-05-23 | Stop reason: HOSPADM

## 2018-05-19 RX ORDER — LIDOCAINE 50 MG/G
1 PATCH TOPICAL DAILY
Status: DISCONTINUED | OUTPATIENT
Start: 2018-05-19 | End: 2018-05-23 | Stop reason: HOSPADM

## 2018-05-19 RX ADMIN — SODIUM CHLORIDE 125 ML/HR: 0.9 INJECTION, SOLUTION INTRAVENOUS at 09:37

## 2018-05-19 RX ADMIN — WARFARIN SODIUM 2.5 MG: 2.5 TABLET ORAL at 15:45

## 2018-05-19 RX ADMIN — NEBIVOLOL HYDROCHLORIDE 5 MG: 5 TABLET ORAL at 15:51

## 2018-05-19 RX ADMIN — CIPROFLOXACIN 400 MG: 2 INJECTION, SOLUTION INTRAVENOUS at 21:16

## 2018-05-19 RX ADMIN — ONDANSETRON 4 MG: 2 INJECTION INTRAMUSCULAR; INTRAVENOUS at 09:37

## 2018-05-19 RX ADMIN — IOHEXOL 100 ML: 350 INJECTION, SOLUTION INTRAVENOUS at 15:24

## 2018-05-19 RX ADMIN — IOHEXOL 50 ML: 240 INJECTION, SOLUTION INTRATHECAL; INTRAVASCULAR; INTRAVENOUS; ORAL at 13:16

## 2018-05-19 RX ADMIN — METRONIDAZOLE 500 MG: 500 INJECTION, SOLUTION INTRAVENOUS at 22:00

## 2018-05-19 RX ADMIN — LIDOCAINE 1 PATCH: 50 PATCH CUTANEOUS at 15:40

## 2018-05-19 RX ADMIN — CHOLESTYRAMINE 4 G: 4 POWDER, FOR SUSPENSION ORAL at 17:22

## 2018-05-19 NOTE — ASSESSMENT & PLAN NOTE
Presentation is atypical  Monitor serial cardiac enzymes and check fasting lipid profile  Patient did have a nuclear stress test in January 2017 which was normal  Patient does have extensive family history  Patient might need repeat nuclear stress test  Cardiology evaluation

## 2018-05-19 NOTE — ASSESSMENT & PLAN NOTE
Patient having nausea, vomiting and diarrhea  Check stool cultures and send stool for Clostridium difficile toxin a and B  Symptomatic treatment

## 2018-05-19 NOTE — CONSULTS
CARDIOLOGY CONSULTATION  Laith Baez 68 y o  female MRN: [id number]  Unit/Bed#: [address] [place] [address] Encounter: [id number]      History of Present Illness   Physician Requesting Consult: Jazmyne Delvalle MD  Reason for Consult / Principal Problem: Chest pain    Assessment/Plan   Chest pain rule out acute coronary syndrome- cardiac enzymes x3  If with positive cardiac enzymes plan to start on anticoagulation  Negative stress test in January of 2017  Right perihilar lung mass-patient with history of non-small cell lung cancer with metastasis  Has declined chemotherapy  Paroxysmal atrial fibrillation- continue Bystolic plus hold anticoagulation given possible need for cardiac catheterization    Borderline diabetes    Gastroenteritis      HPI: Jammie Arellano is a 68y o  year old female who presents with 7/10 chest pain while sitting up watching television associated with diarrhea, nausea, and vomiting  She reports having multiple episodes of diarrhea which were loose and watery lasting about an hour followed by nausea and 2 episodes of vomiting  She later developed retrosternal chest pain which was sharp and pleuritic in nature    She reports a strong family history of coronary artery disease    Historical Information   Past Medical History:   Diagnosis Date    Arthritis     Atrial fibrillation ([facility] )     Borderline diabetes     watches diet    Cancer ([facility] )     lymphnodes, lung, stomach, possible cervical, bone    Diverticulitis     Fracture, foot 12/30/2016    right foot-no surgical intervention    Full dentures     GERD (gastroesophageal reflux disease)     Hearing aid worn     both ears    Hearing decreased, bilateral     has hearing    History of anemia 2016    transfused with 2 units-unknown etiology    History of transfusion 2016    2 units    Hypertension     Osteoporosis     Pulmonary embolism (HCC)     x2 episodes-on warfarin    Sciatica     Tachycardia     Wears glasses     for reading     Past Surgical History:   Procedure Laterality Date    CARPAL TUNNEL RELEASE Right     CATARACT EXTRACTION Bilateral     CHOLECYSTECTOMY      open    COLONOSCOPY      EGD AND COLONOSCOPY N/A 7/1/2016    Procedure: EGD AND COLONOSCOPY;  Surgeon: Saran Newman MD;  Location: [facility] GI LAB; Service:     HYSTERECTOMY      one ovary remains    LYMPH NODE BIOPSY Right 1/24/2018    Procedure: EXCISIONAL BIOPSY SUPRACLAVICULAR LYMPH NODE;  Surgeon: Fernanda Hernandez MD;  Location: [address] [place] [address];  Service: General    NECK SURGERY      bone spur with bone transplant  2nd surgery "pinched nerve"    NV SHLDR ARTHROSCOP,SURG,W/ROTAT CUFF REPR Right 9/11/2017    Procedure: SHOULDER ARTHROSCOPY WITH ROTATOR CUFF REPAIR, BICEPS TENOTOMY AND SUBACROMIAL DECOMPRESSION;  Surgeon: Alo Campbell MD;  Location: [facility] MAIN OR;  Service: Orthopedics    ROTATOR CUFF REPAIR Left 01/08/2015     History   Alcohol Use No     History   Drug Use No     History   Smoking Status    Never Smoker   Smokeless Tobacco    Never Used     Family History   Problem Relation Age of Onset    Diabetes Mother     Heart attack Mother     Heart attack Father     Diabetes Sister      insulin dependent    Heart disease Sister      CABG    Diabetes Brother      insulin dependent    Heart disease Brother      CABG       Meds/Allergies   Prior to Admission medications    Medication Sig Start Date End Date Taking? Authorizing Provider   acetaminophen (TYLENOL) 325 mg tablet Take 2 tablets (650 mg total) by mouth every 6 (six) hours as needed for mild pain, headaches or fever   7/2/16  Yes Vero Pat MD   Calcium Carb-Cholecalciferol (CALCIUM 600-D PO) Take by mouth Monday thru Friday   Yes Historical Provider, MD   Cholecalciferol (VITAMIN D PO) Take 3,000 Units by mouth Monday thru Friday   Yes Historical Provider, MD hazel [facility]) 625 mg tablet Take 1,875 mg by mouth 2 (two) times a day with meals     Yes Historical Provider, MD   GLUCOSAMINE SULFATE PO Take 2,000 mg by mouth daily     Yes Historical Provider, MD   Lidocaine (LIDODERM EX) Apply topically 2 (two) times a day Lower back, pt used OTC cream on LB    Yes Historical Provider, MD   lidocaine (XYLOCAINE) 5 % ointment Apply topically   Yes Historical Provider, MD   nebivolol (BYSTOLIC) 10 mg tablet Take 10 mg by mouth every morning     Yes Historical Provider, MD   nebivolol (BYSTOLIC) 5 mg tablet Take 5 mg by mouth daily with lunch     Yes Historical Provider, MD   ondansetron (ZOFRAN) 4 mg tablet TAKE 1 TABLET BY MOUTH TWICE A DAY AS NEEDED FOR NAUSEA 3/19/18  Yes Historical Provider, MD   pantoprazole (PROTONIX) 40 mg tablet Take 40 mg by mouth every morning     Yes Historical Provider, MD   risedronate (ACTONEL) 150 MG tablet Take 150 mg by mouth every 30 (thirty) days with water on empty stomach, nothing by mouth or lie down for next 30 minutes     Yes Historical Provider, MD   Warfarin Sodium (COUMADIN PO) Take 2 5 mg by mouth daily     Yes Historical Provider, MD   albuterol (PROVENTIL HFA,VENTOLIN HFA) 90 mcg/act inhaler Inhale 1-2 puffs every 6 (six) hours as needed for wheezing 9/14/17   Olamide Anepu, DO   warfarin (COUMADIN) 3 mg tablet Take 2 5 mg by mouth Last dose 1/18/18 5/19/18  Historical Provider, MD     Current Facility-Administered Medications   Medication Dose Route Frequency Provider Last Rate Last Dose    acetaminophen (TYLENOL) tablet 650 mg  650 mg Oral Q6H PRN Agustina Morales MD        albuterol (PROVENTIL HFA,VENTOLIN HFA) inhaler 1 puff  1 puff Inhalation Q6H PRN Agustina Morales MD        [START ON 5/20/2018] calcium carbonate-vitamin D (OSCAL-D) 500 mg-200 units per tablet 1 tablet  1 tablet Oral Daily With Breakfast Agustina Morales MD        cholestyramine sugar free (QUESTRAN LIGHT) packet 4 g  4 g Oral BID Agustina Morales MD        influenza inactivated quadrivalent vaccine (FLULAVAL) IM injection 0 5 mL  0 5 mL Intramuscular Prior to discharge Carol Ordonez MD        lidocaine (LIDODERM) 5 % patch 1 patch  1 patch Transdermal Daily Carol Ordonez MD   1 patch at 05/19/18 1540    [START ON 5/20/2018] nebivolol (BYSTOLIC) tablet 10 mg  10 mg Oral QAM Enid Fowler MD        nebivolol (BYSTOLIC) tablet 5 mg  5 mg Oral Daily With Lunch Carol Ordonez MD   5 mg at 05/19/18 1551    ondansetron (ZOFRAN) injection 4 mg  4 mg Intravenous Q6H PRN Carol Ordonez MD        [START ON 5/20/2018] pantoprazole (PROTONIX) EC tablet 40 mg  40 mg Oral QAM Enid Fowler MD        sodium chloride 0 9 % infusion  125 mL/hr Intravenous Continuous Srinivasannora Sanchez  mL/hr at 05/19/18 0937 125 mL/hr at 05/19/18 3659    warfarin (COUMADIN) tablet 2 5 mg  2 5 mg Oral Daily Carol Ordonez MD   2 5 mg at 05/19/18 1545     Allergies   Allergen Reactions    Codeine Other (See Comments)     Patient experience chest pain    Cortisone Rash     Severe rash and redness    Pneumovax [Pneumococcal Polysaccharide Vaccine] Swelling     Swelling, red rash and pain at injection site    Penicillins     Asa [Aspirin] GI Intolerance       Review of systems  CONSTITUTIONAL:  No weight loss, fever, chills, weakness or fatigue  HEENT:  Eyes:  No visual loss, blurred vision, double vision or yellow sclerae  Ears, Nose, Throat:  No hearing loss, sneezing, congestion, runny nose or sore throat  SKIN:  No rash or itching  CARDIOVASCULAR:  As per HPI  RESPIRATORY:  No shortness of breath, cough or sputum  GASTROINTESTINAL:  No anorexia, nausea, vomiting or diarrhea  No abdominal pain or blood  GENITOURINARY:  Burning on urination  No flank pain  NEUROLOGICAL:  No headache, dizziness, syncope, paralysis, ataxia, numbness or tingling in the extremities  No change in bowel or bladder control  MUSCULOSKELETAL:  No muscle, back pain, joint pain or stiffness  HEMATOLOGIC:  No anemia, bleeding or bruising    LYMPHATICS:  No enlarged nodes  No history of splenectomy  PSYCHIATRIC:  No active suicidal or homicidal ideation  ENDOCRINOLOGIC:  No reports of sweating, cold or heat intolerance  No polyuria or polydipsia  ALLERGIES:  No history of asthma, hives, eczema or rhinitis  More than 10 systems reviewed and otherwise noncontributory  Objective   Vitals: Blood pressure 161/70, pulse 91, temperature 98 5 °F (36 9 °C), temperature source Oral, resp  rate 18, height 5' (1 524 m), weight 48 9 kg (107 lb 14 4 oz), SpO2 99 %, not currently breastfeeding  Physical Exam   Constitutional: She is oriented to person, place, and time  She appears well-developed and well-nourished  No distress  HENT:   Head: Normocephalic and atraumatic  Right Ear: External ear normal    Left Ear: External ear normal    Nose: Nose normal    Mouth/Throat: No oropharyngeal exudate  Eyes: Conjunctivae are normal  Pupils are equal, round, and reactive to light  Right eye exhibits no discharge  Left eye exhibits no discharge  No scleral icterus  Neck: Normal range of motion  No JVD present  No tracheal deviation present  No thyromegaly present  Cardiovascular: Normal rate, regular rhythm and intact distal pulses  Exam reveals gallop  Exam reveals no friction rub  Murmur heard  Pulmonary/Chest: Effort normal and breath sounds normal  No stridor  No respiratory distress  She has no wheezes  She has no rales  She exhibits no tenderness  Abdominal: Soft  Bowel sounds are normal  She exhibits no distension and no mass  There is no tenderness  There is no rebound and no guarding  Genitourinary:   Genitourinary Comments: No CVA tenderness   Musculoskeletal: She exhibits no edema, tenderness or deformity  Neurological: She is alert and oriented to person, place, and time  She displays normal reflexes  She exhibits normal muscle tone  Skin: Skin is warm and dry  No rash noted  She is not diaphoretic  No erythema     Psychiatric: She has a normal mood and affect   Her behavior is normal  Judgment and thought content normal              Recent Results (from the past 24 hour(s))   CBC and differential    Collection Time: 05/19/18  9:48 AM   Result Value Ref Range    WBC 6 48 4 31 - 10 16 Thousand/uL    RBC 4 19 3 81 - 5 12 Million/uL    Hemoglobin 10 6 (L) 11 5 - 15 4 g/dL    Hematocrit 35 0 34 8 - 46 1 %    MCV 84 82 - 98 fL    MCH 25 3 (L) 26 8 - 34 3 pg    MCHC 30 3 (L) 31 4 - 37 4 g/dL    RDW 14 0 11 6 - 15 1 %    MPV 9 2 8 9 - 12 7 fL    Platelets 010 957 - 581 Thousands/uL    nRBC 0 /100 WBCs    Neutrophils Relative 61 43 - 75 %    Immat GRANS % 1 0 - 2 %    Lymphocytes Relative 30 14 - 44 %    Monocytes Relative 7 4 - 12 %    Eosinophils Relative 0 0 - 6 %    Basophils Relative 1 0 - 1 %    Neutrophils Absolute 3 98 1 85 - 7 62 Thousands/µL    Immature Grans Absolute 0 03 0 00 - 0 20 Thousand/uL    Lymphocytes Absolute 1 97 0 60 - 4 47 Thousands/µL    Monocytes Absolute 0 45 0 17 - 1 22 Thousand/µL    Eosinophils Absolute 0 01 0 00 - 0 61 Thousand/µL    Basophils Absolute 0 04 0 00 - 0 10 Thousands/µL   Comprehensive metabolic panel    Collection Time: 05/19/18  9:48 AM   Result Value Ref Range    Sodium 137 136 - 145 mmol/L    Potassium 4 1 3 5 - 5 3 mmol/L    Chloride 100 100 - 108 mmol/L    CO2 21 21 - 32 mmol/L    Anion Gap 16 (H) 4 - 13 mmol/L    BUN 10 5 - 25 mg/dL    Creatinine 0 99 0 60 - 1 30 mg/dL    Glucose 109 65 - 140 mg/dL    Calcium 8 7 8 3 - 10 1 mg/dL    AST 27 5 - 45 U/L    ALT 30 12 - 78 U/L    Alkaline Phosphatase 46 46 - 116 U/L    Total Protein 8 3 (H) 6 4 - 8 2 g/dL    Albumin 3 9 3 5 - 5 0 g/dL    Total Bilirubin 0 30 0 20 - 1 00 mg/dL    eGFR 55 ml/min/1 73sq m   Lipase    Collection Time: 05/19/18  9:48 AM   Result Value Ref Range    Lipase 113 73 - 393 u/L   Troponin I    Collection Time: 05/19/18  9:48 AM   Result Value Ref Range    Troponin I <0 02 <=0 04 ng/mL   Protime-INR    Collection Time: 05/19/18  9:48 AM   Result Value Ref Range    Protime 25 5 (H) 9 4 - 11 7 seconds    INR 2 41 (H) 0 86 - 1 16   APTT    Collection Time: 18  9:48 AM   Result Value Ref Range    PTT 28 24 - 33 seconds   Fasting Lipid panel    Collection Time: 18  2:46 PM   Result Value Ref Range    Cholesterol 160 50 - 200 mg/dL    Triglycerides 170 (H) <=150 mg/dL    HDL, Direct 52 40 - 60 mg/dL    LDL Calculated 74 0 - 100 mg/dL    Non-HDL-Chol (CHOL-HDL) 108 mg/dl   Troponin I    Collection Time: 18  2:53 PM   Result Value Ref Range    Troponin I <0 02 <=0 04 ng/mL     Imaging: I have personally reviewed pertinent films in PACS    Cardiac testing:   Results for orders placed during the hospital encounter of 17   Echo complete with contrast if indicated    [address]  [address] [facility] [address]  [phone]    Transthoracic Echocardiogram  2D, M-mode, Doppler, and Color Doppler    Study date:  2017    Patient: Sergio Rivera  MR number: [id number]  Account number: [de-identified]  : 1940  Age: 68 years  Gender: Female  Status: Routine  Location: Bedside  Height: 60 in  Weight: 99 9 lb  BP: 94/ 50 mmHg    Indications: CAD    Diagnoses: I25 83 - Coronary atherosclerosis due to lipid rich plaque    Sonographer:  Jamil Kern  Primary Physician:  Mya Giraldo MD  Group:  Kelsey Colby  Interpreting Physician:  Brandin Garnett MD    SUMMARY    LEFT VENTRICLE:  Systolic function was normal  Ejection fraction was estimated in the range of 60 % to 65 % to be 65 %  Although no diagnostic regional wall motion abnormality was identified, this possibility cannot be completely excluded on the basis of this study  Wall thickness was at the upper limits of normal   Doppler parameters were consistent with abnormal left ventricular relaxation (grade 1 diastolic dysfunction)  MITRAL VALVE:  There was mild annular calcification  There was mild regurgitation      TRICUSPID VALVE:  There was mild regurgitation  Estimated peak PA pressure was 30 mmHg  HISTORY: PRIOR HISTORY: HTN, GERD    PROCEDURE: The procedure was performed at the bedside  This was a routine study  The transthoracic approach was used  The study included complete 2D imaging, M-mode, complete spectral Doppler, and color Doppler  The heart rate was 64 bpm,  at the start of the study  Image quality was adequate  LEFT VENTRICLE: Size was normal  Systolic function was normal  Ejection fraction was estimated in the range of 60 % to 65 % to be 65 %  Although no diagnostic regional wall motion abnormality was identified, this possibility cannot be  completely excluded on the basis of this study  Wall thickness was at the upper limits of normal  DOPPLER: Doppler parameters were consistent with abnormal left ventricular relaxation (grade 1 diastolic dysfunction)  RIGHT VENTRICLE: The size was normal  Systolic function was normal     LEFT ATRIUM: Size was normal     RIGHT ATRIUM: Size was normal     MITRAL VALVE: There was mild annular calcification  DOPPLER: There was no evidence for stenosis  There was mild regurgitation  AORTIC VALVE: The valve was probably trileaflet  Leaflets exhibited mildly increased thickness  DOPPLER: There was no evidence for stenosis  There was no regurgitation  TRICUSPID VALVE: DOPPLER: There was mild regurgitation  Estimated peak PA pressure was 30 mmHg  PULMONIC VALVE: DOPPLER: There was trace regurgitation  PERICARDIUM: There was no thickening or calcification  There was no pericardial effusion  AORTA: The root exhibited normal size  SYSTEMIC VEINS: IVC: The inferior vena cava was normal in size   Respirophasic changes were normal     SYSTEM MEASUREMENT TABLES    2D mode  AoR Diam 2D: 2 7 cm  LA Diam (2D): 3 2 cm  LA/Ao (2D): 1 19  FS (2D Teich): 35 4 %  IVSd (2D): 1 03 cm  LVDEV: 45 1 cm³  LVESV: 15 3 cm³  LVIDd(2D): 3 33 cm  LVISd (2D): 2 15 cm  LVPWd (2D): 0 88 cm  SV (Teich): 29 8 cm³    Apical four chamber  LVEF A4C: 63 %    Unspecified Scan Mode  MV Peak A Luis: 611 mm/s  MV Peak E Luis  Mean: 736 mm/s  MVA (PHT): 3 86 cm squared  PHT: 57 ms  Max P mm[Hg]  V Max: 2530 mm/s  Vmax: 2440 mm/s  RA Area: 7 9 cm squared  RA Volume: 11 8 cm³  TAPSE: 1 9 cm    [facility]    Prepared and electronically signed by    Jerri Vasquez MD  Signed 2017 17:20:53           Results for orders placed during the hospital encounter of 17   NM myocardial perfusion spect (stress and/or rest)    Narrative [address]  [address] [facility] [phone]    Rest/Stress Gated SPECT Myocardial Perfusion Imaging After Regadenoson    Patient: Olga Ugarte  MR number: [id number]  Account number: [de-identified]  : 1940  Age: 68 years  Gender: Female  Status: Outpatient  Location: Stress lab  Height: 60 in  Weight: 100 lb  BP: 133/ 70 mmHg    Allergies: CORTISONE, CODEINE, ASPIRIN    Diagnosis: 401 9 - HYPERTENSION NOS, R07 9 - Chest pain, unspecified    Primary Physician:  Linda Arnold MD  RN:  MAVIS Colon  Group:  Elmer Ree  Report Prepared By[de-identified]  MAVIS Colon  Interpreting Physician:  Jerri Vasquez MD    INDICATIONS: Detection of coronary artery disease  HISTORY: The patient is a 68year old  female  Chest pain status: chest pain  Coronary artery disease risk factors: hypertension  Cardiovascular history: arrhythmia-AFib  Co-morbidity: history of lung disease-Pulmonary Emboli  Medications: Warfarin, a beta blocker and a lipid lowering agent  PHYSICAL EXAM: Baseline physical exam screening: no wheezes audible  REST ECG: Normal sinus rhythm  PROCEDURE: The study was performed in the stress lab  A regadenoson infusion pharmacologic stress test was performed   Gated SPECT myocardial perfusion imaging was performed after stress and at rest  Systolic blood pressure was 133 mmHg, at  the start of the study  Diastolic blood pressure was 70 mmHg, at the start of the study  The heart rate was 67 bpm, at the start of the study  IV double checked  Regadenoson protocol:  Time HR bpm SBP mmHg DBP mmHg Symptoms Rhythm/conduct  Baseline 13:53 73 133 70 none NSR  Immediate 13:56 107 141 83 moderate dyspnea, flushing --  1 min 13:57 113 139 83 same as above --  2 min 13:58 113 146 72 subsiding --  3 min 13:59 109 150 90 subsiding --  4 min 14:00 104 155 78 none --  No medications or fluids given  STRESS SUMMARY: Duration of pharmacologic stress was 4 min  Maximal heart rate during stress was 113 bpm  The heart rate response to stress was normal  There was normal resting blood pressure with an appropriate response to stress  The  rate-pressure product for the peak heart rate and blood pressure was 33745  There was no chest pain during stress  The stress test was terminated due to protocol completion  Pre oxygen saturation: 98 %  Peak oxygen saturation: 98 %  There  were no stress arrhythmias or conduction abnormalities  ISOTOPE ADMINISTRATION:  The radiopharmaceutical was injected at the peak effect of pharmacologic stress  MYOCARDIAL PERFUSION IMAGING:  The image quality was good  Left ventricular size was normal     PERFUSION DEFECTS:  -  There were no perfusion defects  GATED SPECT:  The calculated left ventricular ejection fraction was 70 %  Left ventricular ejection fraction was within normal limits by visual estimate  There was no left ventricular regional abnormality  SUMMARY:  -  Stress results: There was no chest pain during stress  -  Perfusion imaging: There were no perfusion defects   -  Gated SPECT: The calculated left ventricular ejection fraction was 70 %  Left ventricular ejection fraction was within normal limits by visual estimate  There was no left ventricular regional abnormality  IMPRESSIONS: Normal study after pharmacologic vasodilation  Myocardial perfusion imaging was normal at rest and with stress  Left ventricular systolic function was normal     Prepared and signed by    Teofilo Land MD  Signed 01/18/2017 16:59:38         EKG:  Normal sinus rhythm nonspecific ST changes unchanged from prior EKG    Counseling / Coordination of Care  Total time spent today 70 minutes  Greater than 50% of total time was spent with the patient and / or family counseling and / or coordination of care  Leonarda Guzman MD [facility]      "This note has been constructed using a voice recognition system  Therefore there may be syntax, spelling, and/or grammatical errors   Please call if you have any questions  "

## 2018-05-19 NOTE — PLAN OF CARE
CARDIOVASCULAR - ADULT     Maintains optimal cardiac output and hemodynamic stability Progressing     Absence of cardiac dysrhythmias or at baseline rhythm Progressing        GASTROINTESTINAL - ADULT     Minimal or absence of nausea and/or vomiting Progressing     Maintains adequate nutritional intake Progressing        Potential for Falls     Patient will remain free of falls Progressing

## 2018-05-19 NOTE — H&P
H&P- Dayton Monzon 1940, 68 y o  female MRN: [id number]    Unit/Bed#: [address] [place] [address] Encounter: [id number]    Primary Care Provider: Rojelio Mendoza MD   Date and time admitted to hospital: 5/19/2018  9:01 AM        * Chest pain   Assessment & Plan    Presentation is atypical  Monitor serial cardiac enzymes and check fasting lipid profile  Patient did have a nuclear stress test in January 2017 which was normal  Patient does have extensive family history  Patient might need repeat nuclear stress test  Cardiology evaluation        Gastroenteritis   Assessment & Plan    Patient having nausea, vomiting and diarrhea  Check stool cultures and send stool for Clostridium difficile toxin a and B  Symptomatic treatment        Borderline diabetes   Assessment & Plan    Diet controlled        Atrial fibrillation ([address] )   Assessment & Plan    Currently in sinus rhythm  Continue Bystolic and anticoagulation with Coumadin        Non-small cell carcinoma of left lung (HCC)   Assessment & Plan    With metastasis  Patient refused chemotherapy          VTE Prophylaxis: Warfarin (Coumadin)  / sequential compression device   Code Status: DNR/DNI    Anticipated Length of Stay:  Patient will be admitted on an Observation basis with an anticipated length of stay of  Less than 2 midnights  Justification for Hospital Stay: Chest pain, Gastroenteritis    Total Time for Visit, including Counseling / Coordination of Care: 1 hour  Greater than 50% of this total time spent on direct patient counseling and coordination of care  Chief Complaint:   Chest pain    History of Present Illness:    Dayton Monzon is a 68 y o  female with past medical history of atrial fibrillation, PE, metastatic lung cancer, borderline diabetes present to the emergency room complaining of vomiting, diarrhea, abdominal pain and chest pain   Patient reported that she had multiple episodes of diarrhea which is loose and watery last night lasting for about an hour followed by nausea and 2 episodes of vomiting  Later patient eloped retrosternal chest pain which is sharp and pleuritic in nature  Patient called EMS and was given nitroglycerin with resolution of chest pain  Patient denies any shortness of breath, headache, dizziness, fever, chills, urinary complaints  Patient did have a nuclear stress test in January 2017 which was normal  In the ED patient's vital signs were normal   Patient's EKG and initial troponin is negative    Review of Systems:    Review of Systems    Past Medical and Surgical History:     Past Medical History:   Diagnosis Date    Arthritis     Atrial fibrillation ([address] )     Borderline diabetes     watches diet    Diverticulitis     Fracture, foot 12/30/2016    right foot-no surgical intervention    Full dentures     GERD (gastroesophageal reflux disease)     Hearing aid worn     both ears    Hearing decreased, bilateral     has hearing    History of anemia 2016    transfused with 2 units-unknown etiology    History of transfusion 2016    2 units    Hypertension     Metastatic primary lung cancer, right ([address] )     Osteoporosis     Pulmonary embolism (HCC)     x2 episodes-on warfarin    Sciatica     Tachycardia     Wears glasses     for reading       Past Surgical History:   Procedure Laterality Date    CARPAL TUNNEL RELEASE Right     CATARACT EXTRACTION Bilateral     CHOLECYSTECTOMY      open    COLONOSCOPY      EGD AND COLONOSCOPY N/A 7/1/2016    Procedure: EGD AND COLONOSCOPY;  Surgeon: Jeb Griffin MD;  Location: [place] [address] GI LAB; Service:     HYSTERECTOMY      one ovary remains    LYMPH NODE BIOPSY Right 1/24/2018    Procedure: EXCISIONAL BIOPSY SUPRACLAVICULAR LYMPH NODE;  Surgeon: Michelle Esparza MD;  Location: [address] [place] [address];  Service: General    NECK SURGERY      bone spur with bone transplant   2nd surgery "pinched nerve"    [place] SHLDR ARTHROSCOP,SURG,W/ROTAT CUFF REPR Right 9/11/2017    Procedure: SHOULDER ARTHROSCOPY WITH ROTATOR CUFF REPAIR, BICEPS TENOTOMY AND SUBACROMIAL DECOMPRESSION;  Surgeon: Jeri Glass MD;  Location: [facility] MAIN OR;  Service: Orthopedics    ROTATOR CUFF REPAIR Left 01/08/2015       Meds/Allergies:    Prior to Admission medications    Medication Sig Start Date End Date Taking? Authorizing Provider   acetaminophen (TYLENOL) 325 mg tablet Take 2 tablets (650 mg total) by mouth every 6 (six) hours as needed for mild pain, headaches or fever  7/2/16  Yes Breck Mohs, MD   Calcium Carb-Cholecalciferol (CALCIUM 600-D PO) Take by mouth Monday thru Friday   Yes Historical Provider, MD   Cholecalciferol (VITAMIN D PO) Take 3,000 Units by mouth Monday thru Friday   Yes [facility], [facility] 625 mg tablet Take 1,875 mg by mouth 2 (two) times a day with meals     Yes Historical Provider, MD   GLUCOSAMINE SULFATE PO Take 2,000 mg by mouth daily     Yes Historical Provider, MD   Lidocaine (LIDODERM EX) Apply topically 2 (two) times a day Lower back, pt used OTC cream on LB    Yes Historical Provider, MD   lidocaine (XYLOCAINE) 5 % ointment Apply topically   Yes Historical Provider, MD   nebivolol (BYSTOLIC) 10 mg tablet Take 10 mg by mouth every morning     Yes Historical Provider, MD   nebivolol (BYSTOLIC) 5 mg tablet Take 5 mg by mouth daily with lunch     Yes Historical Provider, MD   ondansetron (ZOFRAN) 4 mg tablet TAKE 1 TABLET BY MOUTH TWICE A DAY AS NEEDED FOR NAUSEA 3/19/18  Yes Historical Provider, MD   pantoprazole (PROTONIX) 40 mg tablet Take 40 mg by mouth every morning     Yes Historical Provider, MD   risedronate (ACTONEL) 150 MG tablet Take 150 mg by mouth every 30 (thirty) days with water on empty stomach, nothing by mouth or lie down for next 30 minutes     Yes Historical Provider, MD   Warfarin Sodium (COUMADIN PO) Take 2 5 mg by mouth daily     Yes Historical Provider, MD   albuterol (PROVENTIL HFA,VENTOLIN HFA) 90 mcg/act inhaler Inhale 1-2 puffs every 6 (six) hours as needed for wheezing 9/14/17   Olamide Gonzalez,    warfarin (COUMADIN) 3 mg tablet Take 2 5 mg by mouth Last dose 1/18/18 5/19/18  Historical Provider, MD ROTH have reviewed home medications with patient personally  Allergies: Allergies   Allergen Reactions    Codeine Other (See Comments)     Patient experience chest pain    Cortisone Rash     Severe rash and redness    Pneumovax [Pneumococcal Polysaccharide Vaccine] Swelling     Swelling, red rash and pain at injection site    Penicillins     Asa [Aspirin] GI Intolerance       Social History:     Marital Status: Single     Substance Use History:   History   Alcohol Use No     History   Smoking Status    Never Smoker   Smokeless Tobacco    Never Used     History   Drug Use No       Family History:    Family History   Problem Relation Age of Onset    Diabetes Mother     Heart attack Mother     Heart attack Father     Diabetes Sister      insulin dependent    Heart disease Sister      CABG    Diabetes Brother      insulin dependent    Heart disease Brother      CABG       Physical Exam:     Vitals:   Blood Pressure: 161/70 (05/19/18 1552)  Pulse: 91 (05/19/18 1552)  Temperature: 98 5 °F (36 9 °C) (05/19/18 1552)  Temp Source: Oral (05/19/18 1552)  Respirations: 18 (05/19/18 1552)  Height: 5' (152 4 cm) (05/19/18 1140)  Weight - Scale: 48 9 kg (107 lb 14 4 oz) (05/19/18 1140)  SpO2: 99 % (05/19/18 1552)    Physical Exam   Constitutional: No distress  HENT:   Head: Normocephalic and atraumatic  Nose: Nose normal    Eyes: Conjunctivae and EOM are normal  Pupils are equal, round, and reactive to light  Neck: Normal range of motion  Neck supple  No JVD present  Cardiovascular: Normal rate, regular rhythm and normal heart sounds  Exam reveals no gallop and no friction rub  No murmur heard  Pulmonary/Chest: Effort normal and breath sounds normal  No respiratory distress  She has no wheezes  She has no rales   She exhibits no tenderness  Abdominal: Soft  Bowel sounds are normal  She exhibits no distension  There is tenderness  There is no rebound and no guarding  Right middle quadrant abdominal tenderness   Musculoskeletal: She exhibits no edema  Neurological: She is alert  No cranial nerve deficit  Skin: Skin is warm and dry  No rash noted  Psychiatric: She has a normal mood and affect  Additional Data:     Lab Results: I have personally reviewed pertinent films in PACS      Results from last 7 days  Lab Units 05/19/18  0948   WBC Thousand/uL 6 48   HEMOGLOBIN g/dL 10 6*   HEMATOCRIT % 35 0   PLATELETS Thousands/uL 328   NEUTROS PCT % 61   LYMPHS PCT % 30   MONOS PCT % 7   EOS PCT % 0       Results from last 7 days  Lab Units 05/19/18  0948   SODIUM mmol/L 137   POTASSIUM mmol/L 4 1   CHLORIDE mmol/L 100   CO2 mmol/L 21   BUN mg/dL 10   CREATININE mg/dL 0 99   CALCIUM mg/dL 8 7   TOTAL PROTEIN g/dL 8 3*   BILIRUBIN TOTAL mg/dL 0 30   ALK PHOS U/L 46   ALT U/L 30   AST U/L 27   GLUCOSE RANDOM mg/dL 109       Results from last 7 days  Lab Units 05/19/18  0948   INR  2 41*               Imaging: I have personally reviewed pertinent films in PACS    XR chest 1 view portable   Final Result by Lupe Rosario MD (05/19 1026)      Right perihilar lung mass again visualized  No new consolidation  Workstation performed: [id number]         CT abdomen pelvis w contrast    (Results Pending)       EKG, Pathology, and Other Studies Reviewed on Admission:   · EKG:  EKG shows normal sinus rhythm with nonspecific ST-T changes which is similar to EKG done previous    Allscripts / Epic Records Reviewed: Yes     ** Please Note: This note has been constructed using a voice recognition system   **

## 2018-05-19 NOTE — ED PROVIDER NOTES
History  Chief Complaint   Patient presents with    Chest Pain     pt c/o cp 7/10 started at 0400 today, pt was given 2 nitro sl by medics and cp went down to 2/10 per medics  pt dx with lung cancer last year and pt of Dr Beatriz Wood     Patient is a 49-year-old female who presents with complaint of sitting up watching TV because he could not sleep after go to the bathroom had an episode of diarrhea had some nausea and vomiting x1  After the vomiting the patient started having substernal chest pain that was sharp stabbing nonradiating  Patient denies any diaphoresis or shortness of breath with the episode  911 was called the patient was given nitro times to by the medics with complete resolution of her chest pain  Patient denies any abdominal pain, no fevers or chills  Patient states she does have bad reflux but it has been well controlled recently with her medications  Patient and her sister reports strong family history of coronary artery disease  Patient states she has seen cardiologist in the past which she is not sure the last time she had any workup  Prior to Admission Medications   Prescriptions Last Dose Informant Patient Reported? Taking? Calcium Carb-Cholecalciferol (CALCIUM 600-D PO)  Self Yes No   Sig: Take by mouth Monday thru Friday   Cholecalciferol (VITAMIN D PO)  Self Yes Yes   Sig: Take 3,000 Units by mouth Monday thru Friday   GLUCOSAMINE SULFATE PO  Self Yes Yes   Sig: Take 2,000 mg by mouth daily     Lidocaine (LIDODERM EX)  Self Yes Yes   Sig: Apply topically 2 (two) times a day Apply cream to right shoulder and arm   Warfarin Sodium (COUMADIN PO)   Yes Yes   Sig: Take 2 5 mg by mouth daily     acetaminophen (TYLENOL) 325 mg tablet  Self No Yes   Sig: Take 2 tablets (650 mg total) by mouth every 6 (six) hours as needed for mild pain, headaches or fever     albuterol (PROVENTIL HFA,VENTOLIN HFA) 90 mcg/act inhaler  Self No Yes   Sig: Inhale 1-2 puffs every 6 (six) hours as needed for wheezing   colesevelam (WELCHOL) 625 mg tablet  Self Yes Yes   Sig: Take 1,875 mg by mouth 2 (two) times a day with meals     lidocaine (XYLOCAINE) 5 % ointment  Self Yes No   Sig: Apply topically   nebivolol (BYSTOLIC) 10 mg tablet  Self Yes Yes   Sig: Take 10 mg by mouth every morning     nebivolol (BYSTOLIC) 5 mg tablet  Self Yes Yes   Sig: Take 5 mg by mouth daily with lunch     ondansetron (ZOFRAN) 4 mg tablet   Yes No   Sig: TAKE 1 TABLET BY MOUTH TWICE A DAY AS NEEDED FOR NAUSEA   pantoprazole (PROTONIX) 40 mg tablet  Self Yes Yes   Sig: Take 40 mg by mouth every morning     risedronate (ACTONEL) 150 MG tablet  Self Yes Yes   Sig: Take 150 mg by mouth every 30 (thirty) days with water on empty stomach, nothing by mouth or lie down for next 30 minutes  Facility-Administered Medications: None       Past Medical History:   Diagnosis Date    Arthritis     At[address] ([facility] )     Borderline diabetes     watches diet    Cancer ([facility] )     lymphnodes, lung, stomach, possible cervical, bone    Diverticulitis     Fracture, foot 12/30/2016    right foot-no surgical intervention    Full dentures     GERD (gastroesophageal reflux disease)     Hearing aid worn     both ears    Hearing decreased, bilateral     has hearing    History of anemia 2016    transfused with 2 units-unknown etiology    History of transfusion 2016    2 units    Hypertension     Osteoporosis     Pulmonary embolism (HCC)     x2 episodes-on warfarin    Sciatica     Tachycardia     Wears glasses     for reading       Past Surgical History:   Procedure Laterality Date    CARPAL TUNNEL RELEASE Right     CATARACT EXTRACTION Bilateral     CHOLECYSTECTOMY      open    COLONOSCOPY      EGD AND COLONOSCOPY N/A 7/1/2016    Procedure: EGD AND COLONOSCOPY;  Surgeon: Jarvis Sykes MD;  Location: [facility] GI LAB;   Service:     HYSTERECTOMY      one ovary remains    LYMPH NODE BIOPSY Right 1/24/2018    Procedure: EXCISIONAL BIOPSY SUPRACLAVICULAR LYMPH NODE;  Surgeon: Jennifer Weir MD;  Location: [address] [place] [address];  Service: General    NECK SURGERY      bone spur with bone transplant  2nd surgery "pinched nerve"    [place] SHLDR ARTHROSCOP,SURG,W/ROTAT CUFF REPR Right 9/11/2017    Procedure: SHOULDER ARTHROSCOPY WITH ROTATOR CUFF REPAIR, BICEPS TENOTOMY AND SUBACROMIAL DECOMPRESSION;  Surgeon: Steven Mathis MD;  Location: [place] [address] MAIN OR;  Service: Orthopedics    ROTATOR CUFF REPAIR Left 01/08/2015       Family History   Problem Relation Age of Onset    Diabetes Mother     Heart attack Mother     Heart attack Father     Diabetes Sister      insulin dependent    Heart disease Sister      CABG    Diabetes Brother      insulin dependent    Heart disease Brother      CABG     I have reviewed and agree with the history as documented  Social History   Substance Use Topics    Smoking status: Never Smoker    Smokeless tobacco: Never Used    Alcohol use No        Review of Systems   Constitutional: Negative for chills and fever  HENT: Negative for facial swelling and trouble swallowing  Respiratory: Negative for chest tightness and shortness of breath  Cardiovascular: Positive for chest pain  Negative for palpitations and leg swelling  Gastrointestinal: Positive for diarrhea, nausea and vomiting  Genitourinary: Negative for flank pain  Musculoskeletal: Negative for back pain and neck pain  Skin: Negative  Neurological: Negative for weakness and numbness  Hematological: Negative  Psychiatric/Behavioral: Negative  Physical Exam  Physical Exam   Constitutional: She is oriented to person, place, and time  She appears well-developed and well-nourished  No distress  HENT:   Head: Normocephalic and atraumatic  Eyes: EOM are normal  Pupils are equal, round, and reactive to light  Neck: Normal range of motion  Neck supple  Cardiovascular: Normal rate and regular rhythm      Pulmonary/Chest: Effort normal and breath sounds normal    Abdominal: Soft  Bowel sounds are normal  She exhibits no distension  There is no tenderness  Musculoskeletal: Normal range of motion  She exhibits no edema  Neurological: She is alert and oriented to person, place, and time  No cranial nerve deficit  Skin: Skin is warm and dry  She is not diaphoretic  Psychiatric: She has a normal mood and affect  Nursing note and vitals reviewed  Vital Signs  ED Triage Vitals [05/19/18 0904]   Temperature Pulse Respirations Blood Pressure SpO2   98 9 °F (37 2 °C) 93 18 128/60 95 %      Temp Source Heart Rate Source Patient Position - Orthostatic VS BP Location FiO2 (%)   Tympanic Monitor Lying Right arm --      Pain Score       --           Vitals:    05/19/18 0904 05/19/18 1039   BP: 128/60 107/57   Pulse: 93 77   Patient Position - Orthostatic VS: Lying Lying       Visual Acuity      ED Medications  Medications   sodium chloride 0 9 % infusion (125 mL/hr Intravenous New Bag 5/19/18 0937)   ondansetron (ZOFRAN) injection 4 mg (4 mg Intravenous Given 5/19/18 0937)       Diagnostic Studies  Results Reviewed     Procedure Component Value Units Date/Time    Troponin I [[id number]]  (Normal) Collected:  05/19/18 0948    Lab Status:  Final result Specimen:  Blood from Hand, Left Updated:  05/19/18 1016     Troponin I <0 02 ng/mL     Narrative:         [employer] Chemistry analyzer 99% cutoff is > 0 04 ng/mL in network labs    o cTnI 99% cutoff is useful only when applied to patients in the clinical setting of myocardial ischemia  o cTnI 99% cutoff should be interpreted in the context of clinical history, ECG findings and possibly cardiac imaging to establish correct diagnosis  o cTnI 99% cutoff may be suggestive but clearly not indicative of a coronary event without the clinical setting of myocardial ischemia      APTT [[id number]]  (Normal) Collected:  05/19/18 0948    Lab Status:  Final result Specimen:  Blood from Hand, Left Updated:  05/19/18 1013     PTT 28 seconds     Protime-INR [[id number]]  (Abnormal) Collected:  05/19/18 0948    Lab Status:  Final result Specimen:  Blood from Hand, Left Updated:  05/19/18 1013     Protime 25 5 (H) seconds      INR 2 41 (H)    Comprehensive metabolic panel [[id number]]  (Abnormal) Collected:  05/19/18 0948    Lab Status:  Final result Specimen:  Blood from Hand, Left Updated:  05/19/18 1012     Sodium 137 mmol/L      Potassium 4 1 mmol/L      Chloride 100 mmol/L      CO2 21 mmol/L      Anion Gap 16 (H) mmol/L      BUN 10 mg/dL      Creatinine 0 99 mg/dL      Glucose 109 mg/dL      Calcium 8 7 mg/dL      AST 27 U/L      ALT 30 U/L      Alkaline Phosphatase 46 U/L      Total Protein 8 3 (H) g/dL      Albumin 3 9 g/dL      Total Bilirubin 0 30 mg/dL      eGFR 55 ml/min/1 73sq m     Narrative:         National Kidney Disease Education Program recommendations are as follows:  GFR calculation is accurate only with a steady state creatinine  Chronic Kidney disease less than 60 ml/min/1 73 sq  meters  Kidney failure less than 15 ml/min/1 73 sq  meters      Lipase [[id number]]  (Normal) Collected:  05/19/18 0948    Lab Status:  Final result Specimen:  Blood from Hand, Left Updated:  05/19/18 1012     Lipase 113 u/L     CBC and differential [[id number]]  (Abnormal) Collected:  05/19/18 0948    Lab Status:  Final result Specimen:  Blood from Hand, Left Updated:  05/19/18 0955     WBC 6 48 Thousand/uL      RBC 4 19 Million/uL      Hemoglobin 10 6 (L) g/dL      Hematocrit 35 0 %      MCV 84 fL      MCH 25 3 (L) pg      MCHC 30 3 (L) g/dL      RDW 14 0 %      MPV 9 2 fL      Platelets 524 Thousands/uL      nRBC 0 /100 WBCs      Neutrophils Relative 61 %      Immat GRANS % 1 %      Lymphocytes Relative 30 %      Monocytes Relative 7 %      Eosinophils Relative 0 %      Basophils Relative 1 %      Neutrophils Absolute 3 98 Thousands/µL      Immature Grans Absolute 0 03 Thousand/uL      Lymphocytes Absolute 1 97 Thousands/µL      Monocytes Absolute 0 45 Thousand/µL      Eosinophils Absolute 0 01 Thousand/µL      Basophils Absolute 0 04 Thousands/µL                  XR chest 1 view portable   Final Result by Abhinav Mcguire MD (05/19 1026)      Right perihilar lung mass again visualized  No new consolidation  Workstation performed: [id number]                    Procedures  ECG 12 Lead Documentation  Date/Time: 5/19/2018 9:12 AM  Performed by: Herberth Min  Authorized by: Herberth Min     Indications / Diagnosis:  Chest pain  ECG reviewed by me, the ED Provider: yes    Patient location:  ED  Previous ECG:     Previous ECG:  Unavailable    Comparison to cardiac monitor: Yes    Interpretation:     Interpretation: abnormal    Rate:     ECG rate:  85    ECG rate assessment: normal    Rhythm:     Rhythm: sinus rhythm    Ectopy:     Ectopy: none    QRS:     QRS axis:  Normal  Conduction:     Conduction: normal    ST segments:     ST segments:  Non-specific  T waves:     T waves: normal             Phone Contacts  ED Phone Contact    ED Course         HEART Risk Score      Most Recent Value   History  1 Filed at: 05/19/2018 1103   ECG  1 Filed at: 05/19/2018 1103   Age  2 Filed at: 05/19/2018 1103   Risk Factors  1 Filed at: 05/19/2018 1103   Troponin  0 Filed at: 05/19/2018 1103   Heart Score Risk Calculator   History  1 Filed at: 05/19/2018 1103   ECG  1 Filed at: 05/19/2018 1103   Age  2 Filed at: 05/19/2018 1103   Risk Factors  1 Filed at: 05/19/2018 1103   Troponin  0 Filed at: 05/19/2018 1103   HEART Score  5 Filed at: 05/19/2018 1103   HEART Score  5 Filed at: 05/19/2018 1103                            MDM  Number of Diagnoses or Management Options  Chest pain:   Diagnosis management comments: Patient's EKG and troponins showed no acute abnormalities but the patient has a strong family history of coronary artery disease and she did get relief for chest pain with nitroglycerin    Patient is going to need to be placed in the hospital for observation to see cardiologist into run out her troponins  Patient states understanding and she is in agreement with the assessment and plan  Amount and/or Complexity of Data Reviewed  Clinical lab tests: ordered and reviewed  Tests in the radiology section of CPT®: ordered and reviewed      CritCare Time    Disposition  Final diagnoses:   Chest pain     Time reflects when diagnosis was documented in both MDM as applicable and the Disposition within this note     Time User Action Codes Description Comment    5/19/2018 10:53 AM Suellne Islas Add [R07 9] Chest pain       ED Disposition     ED Disposition Condition Comment    Admit  Case was discussed with Dr Amado Mojica and the patient's admission status was agreed to be Admission Status: observation status to the service of Dr Amado Mojica   Follow-up Information    None         Patient's Medications   Discharge Prescriptions    No medications on file     No discharge procedures on file      ED Provider  Electronically Signed by           Ramu Kelly MD  05/19/18 2718

## 2018-05-20 LAB
ANION GAP SERPL CALCULATED.3IONS-SCNC: 10 MMOL/L (ref 4–13)
BUN SERPL-MCNC: 7 MG/DL (ref 5–25)
CALCIUM SERPL-MCNC: 8 MG/DL (ref 8.3–10.1)
CHLORIDE SERPL-SCNC: 106 MMOL/L (ref 100–108)
CO2 SERPL-SCNC: 23 MMOL/L (ref 21–32)
CREAT SERPL-MCNC: 0.86 MG/DL (ref 0.6–1.3)
ERYTHROCYTE [DISTWIDTH] IN BLOOD BY AUTOMATED COUNT: 14.3 % (ref 11.6–15.1)
GFR SERPL CREATININE-BSD FRML MDRD: 65 ML/MIN/1.73SQ M
GLUCOSE P FAST SERPL-MCNC: 96 MG/DL (ref 65–99)
GLUCOSE SERPL-MCNC: 96 MG/DL (ref 65–140)
HCT VFR BLD AUTO: 31.8 % (ref 34.8–46.1)
HGB BLD-MCNC: 9.7 G/DL (ref 11.5–15.4)
INR PPP: 2.2 (ref 0.86–1.16)
MCH RBC QN AUTO: 25.4 PG (ref 26.8–34.3)
MCHC RBC AUTO-ENTMCNC: 30.5 G/DL (ref 31.4–37.4)
MCV RBC AUTO: 83 FL (ref 82–98)
MRSA NOSE QL CULT: NORMAL
PLATELET # BLD AUTO: 323 THOUSANDS/UL (ref 149–390)
PMV BLD AUTO: 9.5 FL (ref 8.9–12.7)
POTASSIUM SERPL-SCNC: 3.6 MMOL/L (ref 3.5–5.3)
PROTHROMBIN TIME: 23.3 SECONDS (ref 9.4–11.7)
RBC # BLD AUTO: 3.82 MILLION/UL (ref 3.81–5.12)
SODIUM SERPL-SCNC: 139 MMOL/L (ref 136–145)
WBC # BLD AUTO: 5.47 THOUSAND/UL (ref 4.31–10.16)

## 2018-05-20 PROCEDURE — 85610 PROTHROMBIN TIME: CPT | Performed by: STUDENT IN AN ORGANIZED HEALTH CARE EDUCATION/TRAINING PROGRAM

## 2018-05-20 PROCEDURE — 85027 COMPLETE CBC AUTOMATED: CPT | Performed by: INTERNAL MEDICINE

## 2018-05-20 PROCEDURE — 99231 SBSQ HOSP IP/OBS SF/LOW 25: CPT | Performed by: INTERNAL MEDICINE

## 2018-05-20 PROCEDURE — 99222 1ST HOSP IP/OBS MODERATE 55: CPT | Performed by: INTERNAL MEDICINE

## 2018-05-20 PROCEDURE — 87505 NFCT AGENT DETECTION GI: CPT | Performed by: INTERNAL MEDICINE

## 2018-05-20 PROCEDURE — 80048 BASIC METABOLIC PNL TOTAL CA: CPT | Performed by: INTERNAL MEDICINE

## 2018-05-20 PROCEDURE — 99232 SBSQ HOSP IP/OBS MODERATE 35: CPT | Performed by: INTERNAL MEDICINE

## 2018-05-20 RX ADMIN — CIPROFLOXACIN 400 MG: 2 INJECTION, SOLUTION INTRAVENOUS at 20:55

## 2018-05-20 RX ADMIN — WARFARIN SODIUM 2.5 MG: 2.5 TABLET ORAL at 08:26

## 2018-05-20 RX ADMIN — CALCIUM CARBONATE 500 MG (1,250 MG)-VITAMIN D3 200 UNIT TABLET 1 TABLET: at 08:26

## 2018-05-20 RX ADMIN — NEBIVOLOL HYDROCHLORIDE 5 MG: 5 TABLET ORAL at 12:12

## 2018-05-20 RX ADMIN — METRONIDAZOLE 500 MG: 500 INJECTION, SOLUTION INTRAVENOUS at 14:16

## 2018-05-20 RX ADMIN — METRONIDAZOLE 500 MG: 500 INJECTION, SOLUTION INTRAVENOUS at 22:44

## 2018-05-20 RX ADMIN — PANTOPRAZOLE SODIUM 40 MG: 40 TABLET, DELAYED RELEASE ORAL at 08:26

## 2018-05-20 RX ADMIN — CHOLESTYRAMINE 4 G: 4 POWDER, FOR SUSPENSION ORAL at 17:27

## 2018-05-20 RX ADMIN — LIDOCAINE 1 PATCH: 50 PATCH CUTANEOUS at 08:26

## 2018-05-20 RX ADMIN — SODIUM CHLORIDE 125 ML/HR: 0.9 INJECTION, SOLUTION INTRAVENOUS at 05:58

## 2018-05-20 RX ADMIN — SODIUM CHLORIDE 50 ML/HR: 0.9 INJECTION, SOLUTION INTRAVENOUS at 17:26

## 2018-05-20 RX ADMIN — NEBIVOLOL HYDROCHLORIDE 10 MG: 10 TABLET ORAL at 08:27

## 2018-05-20 RX ADMIN — CIPROFLOXACIN 400 MG: 2 INJECTION, SOLUTION INTRAVENOUS at 08:26

## 2018-05-20 RX ADMIN — METRONIDAZOLE 500 MG: 500 INJECTION, SOLUTION INTRAVENOUS at 05:58

## 2018-05-20 RX ADMIN — CHOLESTYRAMINE 4 G: 4 POWDER, FOR SUSPENSION ORAL at 08:26

## 2018-05-20 NOTE — PROGRESS NOTES
Progress Note - Marlee Kaufman 1940, 68 y o  female MRN: [id number]    Unit/Bed#: [address] [place] [address] Encounter: [id number]    Primary Care Provider: Yasmin Davidson MD   Date and time admitted to hospital: 5/19/2018  9:01 AM        * Chest pain   Assessment & Plan    Presentation is atypical  Serial cardiac enzymes were negative  Patient did have a nuclear stress test in January 2017 which was normal  Patient does have extensive family history  Possible cardiac catheterization in a m  Gastroenteritis   Assessment & Plan    Patient having nausea, vomiting and diarrhea  CT scan of the abdomen and pelvis test done which showed possible mild sigmoid colitis  Patient was started on IV ciprofloxacin and Flagyl  Patient will be on full liquid diet  Due to persistent abdominal pain will consult Gastroenterology  Await stool cultures and C diff toxin assay        Atrial fibrillation ([facility] )   Assessment & Plan    Currently in sinus rhythm  Continue Bystolic   Coumadin will be held in anticipation for cardiac catheterization  Non-small cell carcinoma of left lung ([facility] [address] )   Assessment & Plan    With metastasis  Patient refused chemotherapy        Borderline diabetes   Assessment & Plan    Diet controlled            VTE Pharmacologic Prophylaxis:   Pharmacologic: Warfarin (Coumadin)  Mechanical VTE Prophylaxis in Place: Yes    Patient Centered Rounds: I have performed bedside rounds with nursing staff today  Discussions with Specialists or Other Care Team Provider: Iam [place]    Education and Discussions with Family / Patient: yes    Time Spent for Care: 30 minutes  More than 50% of total time spent on counseling and coordination of care as described above      Current Length of Stay: 0 day(s)    Current Patient Status: Inpatient   Certification Statement: The patient will continue to require additional inpatient hospital stay due to Chest pain, colitis    Discharge Plan: Home    Code Status: Level 3 - DNAR and DNI      Subjective: The patient had 1 episode of vomiting overnight  Patient also reporting persistent abdominal pain  Patient is having frequent bowel movement but which were all formed  The patient denies any further chest pain or shortness of breath    Objective:     Vitals:   Temp (24hrs), Av 4 °F (36 9 °C), Min:97 5 °F (36 4 °C), Max:99 3 °F (37 4 °C)    HR:  [71-89] 89  Resp:  [18] 18  BP: (111-152)/(55-70) 144/70  SpO2:  [96 %-97 %] 97 %  Body mass index is 21 07 kg/m²  Input and Output Summary (last 24 hours): Intake/Output Summary (Last 24 hours) at 18 1650  Last data filed at 18 1440   Gross per 24 hour   Intake          2993 75 ml   Output             1000 ml   Net          1993 75 ml       Physical Exam:     Physical Exam   Constitutional: No distress  HENT:   Head: Normocephalic and atraumatic  Nose: Nose normal    Eyes: Conjunctivae and EOM are normal  Pupils are equal, round, and reactive to light  Neck: Normal range of motion  Neck supple  No JVD present  Cardiovascular: Normal rate, regular rhythm and normal heart sounds  Exam reveals no gallop and no friction rub  No murmur heard  Pulmonary/Chest: Effort normal and breath sounds normal  No respiratory distress  She has no wheezes  She has no rales  She exhibits no tenderness  Abdominal: Soft  Bowel sounds are normal  She exhibits no distension  There is tenderness  There is no rebound and no guarding  Moderate tenderness in the right middle quadrant   Musculoskeletal: She exhibits no edema  Neurological: She is alert  No cranial nerve deficit  Skin: Skin is warm and dry  No rash noted  Psychiatric: She has a normal mood and affect         Additional Data:     Labs:      Results from last 7 days  Lab Units 18  0435 18  0948   WBC Thousand/uL 5 47 6 48   HEMOGLOBIN g/dL 9 7* 10 6*   HEMATOCRIT % 31 8* 35 0   PLATELETS Thousands/uL 323 328   NEUTROS PCT %  --  61   LYMPHS PCT %  -- 30   MONOS PCT %  --  7   EOS PCT %  --  0       Results from last 7 days  Lab Units 05/20/18  0435 05/19/18  0948   SODIUM mmol/L 139 137   POTASSIUM mmol/L 3 6 4 1   CHLORIDE mmol/L 106 100   CO2 mmol/L 23 21   BUN mg/dL 7 10   CREATININE mg/dL 0 86 0 99   CALCIUM mg/dL 8 0* 8 7   TOTAL PROTEIN g/dL  --  8 3*   BILIRUBIN TOTAL mg/dL  --  0 30   ALK PHOS U/L  --  46   ALT U/L  --  30   AST U/L  --  27   GLUCOSE RANDOM mg/dL 96 109       Results from last 7 days  Lab Units 05/20/18  0434   INR  2 20*       * I Have Reviewed All Lab Data Listed Above  * Additional Pertinent Lab Tests Reviewed: [facility]    Recent Cultures (last 7 days):           Last 24 Hours Medication List:     Current Facility-Administered Medications:  acetaminophen 650 mg Oral Q6H PRN Thomas Lopez MD    albuterol 1 puff Inhalation Q6H PRN Thomas Lopez MD    calcium carbonate-vitamin D 1 tablet Oral Daily With Breakfast Thomas Lopez MD    cholestyramine sugar free 4 g Oral BID Thomas Lopez MD    ciprofloxacin 400 mg Intravenous Q12H Ovi Sadler MD Last Rate: 400 mg (05/20/18 0826)   influenza vaccine 0 5 mL Intramuscular Prior to discharge Thomas Lopez MD    lidocaine 1 patch Transdermal Daily Thomas Lopez MD    metroNIDAZOLE 500 mg Intravenous Joceline Whalen MD Last Rate: 500 mg (05/20/18 1416)   nebivolol 10 mg Oral BHAVIN Lopez MD    nebivolol 5 mg Oral Daily With Ana Caban MD    ondansetron 4 mg Intravenous Q6H PRN Thomas Lopez MD    pantoprazole 40 mg Oral QAM Thomas Lopez MD    sodium chloride 125 mL/hr Intravenous Continuous Macario Bruce MD Last Rate: 125 mL/hr (05/20/18 0558)        Today, Patient Was Seen By: Thomas Lopez MD    ** Please Note: Dictation voice to text software may have been used in the creation of this document   **

## 2018-05-20 NOTE — PROGRESS NOTES
CT reveals mild wall thickening of the sigmoid colon and descending colon could relate to normal underdistention or a very mild colitis as suspected  Will start on Ciprofloxacin and Flagyl

## 2018-05-20 NOTE — SOCIAL WORK
DASH discussion completed  Discussed goals of making sure pt's needs are met upon discharge, pt's preferences are taken into account, pt understands her health condition, medications and symptoms to watch for after returning home and pt is aware of any follow up appointments recommended by hospital physician  Spoke with the pt at the bedside  Pt lives alone in 2nd floor apartment with approx  15 steps to enter  She has a walker if needed for ambulation but is usually independent with her own care  Pt does drive and uses the [employer] in [address]  Pt stated that in the future she fears that she may need extra help with her care, advised of [employer] volunteers and will provide her area office on aging information to follow up when she feels she needs addes assist in the home

## 2018-05-20 NOTE — ASSESSMENT & PLAN NOTE
Presentation is atypical  Serial cardiac enzymes were negative  Patient did have a nuclear stress test in January 2017 which was normal  Patient does have extensive family history  Possible cardiac catheterization in a m

## 2018-05-20 NOTE — ASSESSMENT & PLAN NOTE
Patient having nausea, vomiting and diarrhea  CT scan of the abdomen and pelvis test done which showed possible mild sigmoid colitis  Patient was started on IV ciprofloxacin and Flagyl  Patient will be on full liquid diet  Due to persistent abdominal pain will consult Gastroenterology  Await stool cultures and C diff toxin assay

## 2018-05-20 NOTE — CASE MANAGEMENT
Initial Clinical Review    Admission: Date/Time/Statement: 05/19/18 1055    Orders Placed This Encounter   Procedures    Place in Observation (expected length of stay for this patient is less than two midnights)     Standing Status:   Standing     Number of Occurrences:   1     Order Specific Question:   Admitting Physician     Answer:   Carmencita Rivero     Order Specific Question:   Level of Care     Answer:   Med Surg [16]         ED: Date/Time/Mode of Arrival:   ED Arrival Information     Expected Arrival Acuity Means of Arrival Escorted By Service Admission Type    - 5/19/2018 08:58 [facility] [address] Emergency    Arrival Complaint    chest pain          Chief Complaint:   Chief Complaint   Patient presents with    Chest Pain     pt c/o cp 7/10 started at 0400 today, pt was given 2 nitro sl by medics and cp went down to 2/10 per medics  pt dx with lung cancer last year and pt of Dr Nadiya Mckee       History of Illness:  Liya Smith is a 68 y o  female with past medical history of atrial fibrillation, PE, metastatic lung cancer, borderline diabetes present to the emergency room complaining of vomiting, diarrhea, abdominal pain and chest pain  Patient reported that she had multiple episodes of diarrhea which is loose and watery last night lasting for about an hour followed by nausea and 2 episodes of vomiting  Later patient developed  retrosternal chest pain which is sharp and pleuritic in nature  Patient called EMS and was given nitroglycerin with resolution of chest pain   Patient did have a nuclear stress test in January 2017 which was normal        ED Vital Signs:   ED Triage Vitals   Temperature Pulse Respirations Blood Pressure SpO2   05/19/18 0904 05/19/18 0904 05/19/18 0904 05/19/18 0904 05/19/18 0904   98 9 °F (37 2 °C) 93 18 128/60 95 %         Pain Score       05/19/18 1140       No Pain        Wt Readings from Last 1 Encounters:   05/19/18 48 9 kg (107 lb 14 4 oz)         Abnormal Labs/Diagnostic Test Results:      Troponin wnl    Hb  10 6    Stool for D DIFF    CT ABDOMEN AND PELVIS WITH IV CONTRAST    INDICATION:   Diffuse abdominal pain, gastroenteritis or colitis suspected  Impression:       Right middle lobe lung neoplasm and worsening subcarinal adenopathy/metastatic disease   Small right pleural effusion  Mild wall thickening of the sigmoid colon and descending colon could relate to normal underdistention or a very mild colitis as suspected           ED Treatment:   Medication Administration from 05/19/2018 0857 to 05/19/2018 1140       Date/Time Order Dose Route Action Action by Comments     05/19/2018 0937 sodium chloride 0 9 % infusion 125 mL/hr Intravenous [address] [place]      05/19/2018 1987 ondansetron (ZOFRAN) injection 4 mg 4 mg Intravenous Given Danelle Briones RN           Past Medical/Surgical History:       Admitting Diagnosis: Chest pain [R07 9]    Age/Sex: 68 y o  female    Assessment/Plan:      Chest pain   Assessment & Plan     Presentation is atypical  Monitor serial cardiac enzymes and check fasting lipid profile  Patient did have a nuclear stress test in January 2017 which was normal  Patient does have extensive family history  Patient might need repeat nuclear stress test  Cardiology evaluation          Gastroenteritis   Assessment & Plan     Patient having nausea, vomiting and diarrhea  Check stool cultures and send stool for Clostridium difficile toxin a and B  Symptomatic treatment          Borderline diabetes   Assessment & Plan     Diet controlled          Atrial fibrillation ([address] )   Assessment & Plan     Currently in sinus rhythm  Continue Bystolic and anticoagulation with Coumadin          Non-small cell carcinoma of left lung ([address] )   Assessment & Plan     With metastasis  Patient refused chemotherapy       PROGRESS NOTE   CT reveals mild wall thickening of the sigmoid colon and descending colon could relate to normal underdistention or a very mild colitis as suspected    Will start on Ciprofloxacin and Flagyl    Admission Orders:    Consult cardiology  Consistent carb diet   Telemetry        Scheduled Meds:   Current Facility-Administered Medications:  acetaminophen 650 mg Oral Q6H PRN   albuterol 1 puff Inhalation Q6H PRN   calcium carbonate-vitamin D 1 tablet Oral Daily With Breakfast   cholestyramine sugar free 4 g Oral BID   ciprofloxacin 400 mg Intravenous Q12H   influenza vaccine 0 5 mL Intramuscular Prior to discharge   lidocaine 1 patch Transdermal Daily   metroNIDAZOLE 500 mg Intravenous Q8H   nebivolol 10 mg Oral QAM   nebivolol 5 mg Oral Daily With Lunch   ondansetron 4 mg Intravenous Q6H PRN   pantoprazole 40 mg Oral QAM   sodium chloride 125 mL/hr Intravenous Continuous   warfarin 2 5 mg Oral Daily     Continuous Infusions:   sodium chloride 125 mL/hr     PRN Meds:   acetaminophen    albuterol    influenza vaccine    ondansetron

## 2018-05-20 NOTE — ASSESSMENT & PLAN NOTE
Currently in sinus rhythm  Continue Bystolic   Coumadin will be held in anticipation for cardiac catheterization

## 2018-05-20 NOTE — PROGRESS NOTES
Progress Note - Cardiology   Vincent Baez 68 y o  female MRN: [id number]  Unit/Bed#: [address] [place] [address] Encounter: [id number]    Assessment/Plan:  Chest pain rule out acute coronary syndrome- cardiac enzymes x3  If with positive cardiac enzymes plan to start on anticoagulation  Negative stress test in January of 2017  Possible referred pain secondary to her current nausea and vomiting  Gastroenteritis/mild colitis     Right perihilar lung mass-patient with history of non-small cell lung cancer with metastasis  Has declined chemotherapy      Paroxysmal atrial fibrillation- continue Bystolic plus hold anticoagulation given possible need for cardiac catheterization     Borderline diabetes       Subjective/Objective   Still with nausea and vomiting  Also reports some substernal chest heaviness with also retrosternal sharp pain  Objective:     Vitals: /55 (BP Location: Right arm)   Pulse 77   Temp 98 8 °F (37 1 °C) (Oral)   Resp 18   Ht 5' (1 524 m)   Wt 48 9 kg (107 lb 14 4 oz)   SpO2 96%   BMI 21 07 kg/m²   Vitals:    05/19/18 0904 05/19/18 1140   Weight: 48 1 kg (106 lb) 48 9 kg (107 lb 14 4 oz)     Orthostatic Blood Pressures      Most Recent Value   Blood Pressure  116/55 filed at 05/20/2018 7904   Patient Position - Orthostatic VS  Lying filed at 05/20/2018 1476            Intake/Output Summary (Last 24 hours) at 05/20/18 1351  Last data filed at 05/20/18 3655   Gross per 24 hour   Intake          2543 75 ml   Output              700 ml   Net          1843 75 ml       Invasive Devices     Peripheral Intravenous Line            Peripheral IV 05/19/18 Right Forearm 1 day    Peripheral IV 05/20/18 Left Forearm less than 1 day                Review of Systems: reviewed    Lab Results: I have personally reviewed pertinent lab results  Imaging: I have personally reviewed pertinent reports      EKG: reviewed  VTE Pharmacologic Prophylaxis: Sequential compression device (Venodyne)   VTE Mechanical Prophylaxis: sequential compression device    Counseling / Coordination of Care  Total time spent today 30 minutes  Greater than 50% of total time was spent with the patient and / or family counseling and / or coordination of care   A description of the counseling / coordination of care: chest pain/acs

## 2018-05-21 ENCOUNTER — APPOINTMENT (INPATIENT)
Dept: NON INVASIVE DIAGNOSTICS | Facility: HOSPITAL | Age: 78
DRG: 392 | End: 2018-05-21
Payer: MEDICARE

## 2018-05-21 LAB
BASOPHILS # BLD AUTO: 0.03 THOUSANDS/ΜL (ref 0–0.1)
BASOPHILS NFR BLD AUTO: 1 % (ref 0–1)
CAMPYLOBACTER DNA SPEC NAA+PROBE: NORMAL
EOSINOPHIL # BLD AUTO: 0.06 THOUSAND/ΜL (ref 0–0.61)
EOSINOPHIL NFR BLD AUTO: 1 % (ref 0–6)
ERYTHROCYTE [DISTWIDTH] IN BLOOD BY AUTOMATED COUNT: 14.4 % (ref 11.6–15.1)
HCT VFR BLD AUTO: 30.5 % (ref 34.8–46.1)
HGB BLD-MCNC: 9.2 G/DL (ref 11.5–15.4)
IMM GRANULOCYTES # BLD AUTO: 0.02 THOUSAND/UL (ref 0–0.2)
IMM GRANULOCYTES NFR BLD AUTO: 0 % (ref 0–2)
INR PPP: 2.18 (ref 0.86–1.16)
LYMPHOCYTES # BLD AUTO: 1.7 THOUSANDS/ΜL (ref 0.6–4.47)
LYMPHOCYTES NFR BLD AUTO: 32 % (ref 14–44)
MCH RBC QN AUTO: 25.3 PG (ref 26.8–34.3)
MCHC RBC AUTO-ENTMCNC: 30.2 G/DL (ref 31.4–37.4)
MCV RBC AUTO: 84 FL (ref 82–98)
MONOCYTES # BLD AUTO: 0.56 THOUSAND/ΜL (ref 0.17–1.22)
MONOCYTES NFR BLD AUTO: 11 % (ref 4–12)
NEUTROPHILS # BLD AUTO: 2.96 THOUSANDS/ΜL (ref 1.85–7.62)
NEUTS SEG NFR BLD AUTO: 55 % (ref 43–75)
NRBC BLD AUTO-RTO: 0 /100 WBCS
PLATELET # BLD AUTO: 305 THOUSANDS/UL (ref 149–390)
PMV BLD AUTO: 9.4 FL (ref 8.9–12.7)
PROTHROMBIN TIME: 23.1 SECONDS (ref 9.4–11.7)
RBC # BLD AUTO: 3.64 MILLION/UL (ref 3.81–5.12)
SALMONELLA DNA SPEC QL NAA+PROBE: NORMAL
SHIGA TOXIN STX GENE SPEC NAA+PROBE: NORMAL
SHIGELLA DNA SPEC QL NAA+PROBE: NORMAL
WBC # BLD AUTO: 5.33 THOUSAND/UL (ref 4.31–10.16)

## 2018-05-21 PROCEDURE — 99232 SBSQ HOSP IP/OBS MODERATE 35: CPT | Performed by: INTERNAL MEDICINE

## 2018-05-21 PROCEDURE — 93306 TTE W/DOPPLER COMPLETE: CPT | Performed by: INTERNAL MEDICINE

## 2018-05-21 PROCEDURE — 85025 COMPLETE CBC W/AUTO DIFF WBC: CPT | Performed by: INTERNAL MEDICINE

## 2018-05-21 PROCEDURE — 93306 TTE W/DOPPLER COMPLETE: CPT

## 2018-05-21 PROCEDURE — 85610 PROTHROMBIN TIME: CPT | Performed by: INTERNAL MEDICINE

## 2018-05-21 RX ADMIN — LIDOCAINE 1 PATCH: 50 PATCH CUTANEOUS at 08:27

## 2018-05-21 RX ADMIN — CHOLESTYRAMINE 4 G: 4 POWDER, FOR SUSPENSION ORAL at 08:27

## 2018-05-21 RX ADMIN — PANTOPRAZOLE SODIUM 40 MG: 40 TABLET, DELAYED RELEASE ORAL at 08:28

## 2018-05-21 RX ADMIN — ONDANSETRON 4 MG: 2 INJECTION INTRAMUSCULAR; INTRAVENOUS at 10:28

## 2018-05-21 RX ADMIN — NEBIVOLOL HYDROCHLORIDE 5 MG: 5 TABLET ORAL at 12:17

## 2018-05-21 RX ADMIN — SODIUM CHLORIDE 50 ML/HR: 0.9 INJECTION, SOLUTION INTRAVENOUS at 20:16

## 2018-05-21 RX ADMIN — CIPROFLOXACIN 400 MG: 2 INJECTION, SOLUTION INTRAVENOUS at 08:34

## 2018-05-21 RX ADMIN — CALCIUM CARBONATE 500 MG (1,250 MG)-VITAMIN D3 200 UNIT TABLET 1 TABLET: at 08:28

## 2018-05-21 RX ADMIN — METRONIDAZOLE 500 MG: 500 INJECTION, SOLUTION INTRAVENOUS at 05:38

## 2018-05-21 RX ADMIN — NEBIVOLOL HYDROCHLORIDE 10 MG: 10 TABLET ORAL at 08:29

## 2018-05-21 NOTE — ASSESSMENT & PLAN NOTE
Presentation is atypical  Serial cardiac enzymes were negative  Patient did have a nuclear stress test in January 2017 which was normal  Patient does have extensive family history  Cardiac catheterization was canceled today as INR was elevated  Possible catheterization in a m

## 2018-05-21 NOTE — PHYSICIAN ADVISOR
Current patient class: Inpatient  The patient is currently on Hospital Day: 2      The patient was admitted to the hospital at 21  on 5/20/18 for the following diagnosis:  Chest pain [R07 9]       There is documentation in the medical record of an expected length of stay of at least 2 midnights  The patient is therefore expected to satisfy the 2 midnight benchmark and given the 2 midnight presumption is appropriate for INPATIENT ADMISSION  Given this expectation of a satisfying stay, CMS instructs us that the patient is most often appropriate for inpatient admission under part A provided medical necessity is documented in the chart  After review of the relevant documentation, labs, vital signs and test results, the patient is appropriate for INPATIENT ADMISSION  Admission to the hospital as an inpatient is a complex decision making process which requires the practitioner to consider the patients presenting complaint, history and physical examination and all relevant testing  With this in mind, in this case, the patient was deemed appropriate for INPATIENT ADMISSION  After review of the documentation and testing available at the time of the admission I concur with this clinical determination of medical necessity  Rationale is as follows: The patient is a 68 yrs old Female who presented to the ED at 5/19/2018  9:01 AM with a chief complaint of Chest Pain (pt c/o cp 7/10 started at 0400 today, pt was given 2 nitro sl by medics and cp went down to 2/10 per medics  pt dx with lung cancer last year and pt of Dr Etienne Yap)     Patient continues to remain hospitalized, and will require cardiac catheterization  Patient is noted to have chest pain, multiple comorbid conditions including atrial fibrillation    The patient will satisfy the 2 midnight benchmark tonight, and given the need for further hospitalization and invasive testing, along with Cardiology consultation, the patient is appropriate for inpatient admission  Given the need for further hospitalization, and along with the documentation of medical necessity present in the chart, the patient is appropriate for inpatient admission  The patient is expected to satisfy the 2 midnight benchmark, and will require further acute medical care  The patient does have comorbid conditions which increases the risk for significant adverse outcome  Given this the patient is appropriate for inpatient admission  The patients vitals on arrival were ED Triage Vitals   Temperature Pulse Respirations Blood Pressure SpO2   05/19/18 0904 05/19/18 0904 05/19/18 0904 05/19/18 0904 05/19/18 0904   98 9 °F (37 2 °C) 93 18 128/60 95 %      Temp Source Heart Rate Source Patient Position - Orthostatic VS BP Location FiO2 (%)   05/19/18 0904 05/19/18 0904 05/19/18 0904 05/19/18 0904 --   Tympanic Monitor Lying Right arm       Pain Score       05/19/18 1140       No Pain           Past Medical History:   Diagnosis Date    Arthritis     Atrial fibrillation ([address] )     Borderline diabetes     watches diet    Diverticulitis     Fracture, foot 12/30/2016    right foot-no surgical intervention    Full dentures     GERD (gastroesophageal reflux disease)     Hearing aid worn     both ears    Hearing decreased, bilateral     has hearing    History of anemia 2016    transfused with 2 units-unknown etiology    History of transfusion 2016    2 units    Hypertension     Metastatic primary lung cancer, right ([address] )     Osteoporosis     Pulmonary embolism (HCC)     x2 episodes-on warfarin    Sciatica     Tachycardia     Wears glasses     for reading     Past Surgical History:   Procedure Laterality Date    CARPAL TUNNEL RELEASE Right     CATARACT EXTRACTION Bilateral     CHOLECYSTECTOMY      open    COLONOSCOPY      EGD AND COLONOSCOPY N/A 7/1/2016    Procedure: EGD AND COLONOSCOPY;  Surgeon: Evette Bee MD;  Location: [place] [address] GI LAB;   Service:     HYSTERECTOMY      one ovary remains    LYMPH NODE BIOPSY Right 1/24/2018    Procedure: EXCISIONAL BIOPSY SUPRACLAVICULAR LYMPH NODE;  Surgeon: Traci Silver MD;  Location: [address] [place] [address];  Service: General    NECK SURGERY      bone spur with bone transplant  2nd surgery "pinched nerve"    [place] SHLDR ARTHROSCOP,SURG,W/ROTAT CUFF REPR Right 9/11/2017    Procedure: SHOULDER ARTHROSCOPY WITH ROTATOR CUFF REPAIR, BICEPS TENOTOMY AND SUBACROMIAL DECOMPRESSION;  Surgeon: Inocente Granger MD;  Location: [facility] MAIN OR;  Service: Orthopedics    ROTATOR CUFF REPAIR Left 01/08/2015           Consults have been placed to:   IP CONSULT TO CARDIOLOGY  IP CONSULT TO GASTROENTEROLOGY    Vitals:    05/20/18 0418 05/20/18 0823 05/20/18 1535 05/20/18 1921   BP: 121/58 116/55 144/70 136/63   BP Location: Right arm Right arm Left arm Left arm   Pulse: 77 77 89 89   Resp: 18 18 18 18   Temp: 98 4 °F (36 9 °C) 98 8 °F (37 1 °C) 99 3 °F (37 4 °C) 98 4 °F (36 9 °C)   TempSrc: Temporal Oral Tympanic Tympanic   SpO2: 96% 96% 97% 96%   Weight:       Height:           Most recent labs:    Recent Labs      05/19/18   0948   05/19/18 2123  05/20/18   0434  05/20/18   0435   WBC  6 48   --    --    --   5 47   HGB  10 6*   --    --    --   9 7*   HCT  35 0   --    --    --   31 8*   PLT  328   --    --    --   323   K  4 1   --    --    --   3 6   NA  137   --    --    --   139   CALCIUM  8 7   --    --    --   8 0*   BUN  10   --    --    --   7   CREATININE  0 99   --    --    --   0 86   LIPASE  113   --    --    --    --    INR  2 41*   --    --   2 20*   --    TROPONINI  <0 02   < >  <0 02   --    --    AST  27   --    --    --    --    ALT  30   --    --    --    --    ALKPHOS  46   --    --    --    --    BILITOT  0 30   --    --    --    --     < > = values in this interval not displayed         Scheduled Meds:  Current Facility-Administered Medications:  acetaminophen 650 mg Oral Q6H PRN Vinny Acosta MD    albuterol 1 puff Inhalation Q6H PRN Enid MD Janeth    calcium carbonate-vitamin D 1 tablet Oral Daily With Breakfast Corazon Juarez MD    cholestyramine sugar free 4 g Oral BID Corazon Juarez MD    ciprofloxacin 400 mg Intravenous Q12H Renato Parson MD Last Rate: 400 mg (05/20/18 2055)   influenza vaccine 0 5 mL Intramuscular Prior to discharge Corazon Juarez MD    lidocaine 1 patch Transdermal Daily Corazon Juarez MD    metroNIDAZOLE 500 mg Intravenous Q8H Renato Parson MD Last Rate: 500 mg (05/20/18 1416)   nebivolol 10 mg Oral QAM Corazon Juarez MD    nebivolol 5 mg Oral Daily With Lunch Enid Fowler MD    ondansetron 4 mg Intravenous Q6H PRN Enid Fowler MD    pantoprazole 40 mg Oral QAM Corazon Juarez MD    sodium chloride 50 mL/hr Intravenous Continuous Corazon Juarez MD Last Rate: 50 mL/hr (05/20/18 1726)     Continuous Infusions:  sodium chloride 50 mL/hr Last Rate: 50 mL/hr (05/20/18 1726)     PRN Meds:   acetaminophen    albuterol    influenza vaccine    ondansetron    Surgical procedures (if appropriate):

## 2018-05-21 NOTE — CONSULTS
Consultation - [address] [facility]  Ronan Hutton 68 y o  female MRN: [id number]  Unit/Bed#: [address] [place] [address] Encounter: [id number]        Consults    Reason for Consult / Principal Problem:   Abdominal pain, nausea and vomiting     HPI:   20-year-old Female with history of lung carcinoma, atrial fibrillation was presented because of abdominal pain associated with chest pain, nausea, vomiting and also diarrhea  Patient reports that she was started with these symptoms on Friday night and became worse gradually  She had a CT scan done which showed probable underdistention of the left colon  She feels better and reports having some discomfort in the abdomen  Denies any nausea or vomiting  Denies any more diarrhea  REVIEW OF SYSTEMS:     CONSTITUTIONAL: Denies any fever, chills, or rigors  Good appetite, and no recent weight loss  HEENT: No earache or tinnitus  Denies hearing loss or visual disturbances  CARDIOVASCULAR: No chest pain or palpitations  RESPIRATORY: Denies any cough, hemoptysis, shortness of breath or dyspnea on exertion  GASTROINTESTINAL: As noted in the History of Present Illness  GENITOURINARY: No problems with urination  Denies any hematuria or dysuria  NEUROLOGIC: No dizziness or vertigo, denies headaches  MUSCULOSKELETAL: Denies any muscle or joint pain  SKIN: Denies skin rashes or itching  ENDOCRINE: Denies excessive thirst  Denies intolerance to heat or cold  PSYCHOSOCIAL: Denies depression or anxiety  Denies any recent memory loss         Historical Information   Past Medical History:   Diagnosis Date    Arthritis     Atrial fibrillation ([address] )     Borderline diabetes     watches diet    Diverticulitis     Fracture, foot 12/30/2016    right foot-no surgical intervention    Full dentures     GERD (gastroesophageal reflux disease)     Hearing aid worn     both ears    Hearing decreased, bilateral     has hearing    History of anemia 2016    transfused with 2 units-unknown etiology    History of transfusion 2016    2 units    Hypertension     Metastatic primary lung cancer, right ([address] )     Osteoporosis     Pulmonary embolism (HCC)     x2 episodes-on warfarin    Sciatica     Tachycardia     Wears glasses     for reading     Past Surgical History:   Procedure Laterality Date    CARPAL TUNNEL RELEASE Right     CATARACT EXTRACTION Bilateral     CHOLECYSTECTOMY      open    COLONOSCOPY      EGD AND COLONOSCOPY N/A 7/1/2016    Procedure: EGD AND COLONOSCOPY;  Surgeon: Giacomo Escobar MD;  Location: [facility] GI [facility]; Service:     HYSTERECTOMY      one ovary remains    LYMPH NODE BIOPSY Right 1/24/2018    Procedure: EXCISIONAL BIOPSY SUPRACLAVICULAR LYMPH NODE;  Surgeon: Kenyetta Cueto MD;  Location: [address] [place] [address];  Service: General    NECK SURGERY      bone spur with bone transplant   2nd surgery "pinched nerve"    MN SHLDR ARTHROSCOP,SURG,W/ROTAT CUFF REPR Right 9/11/2017    Procedure: SHOULDER ARTHROSCOPY WITH ROTATOR CUFF REPAIR, BICEPS TENOTOMY AND SUBACROMIAL DECOMPRESSION;  Surgeon: Jazmin Bustos MD;  Location: [facility] MAIN OR;  Service: Orthopedics    ROTATOR CUFF REPAIR Left 01/08/2015     Social History   History   Alcohol Use No     History   Drug Use No     History   Smoking Status    Never Smoker   Smokeless Tobacco    Never Used     Family History   Problem Relation Age of Onset    Diabetes Mother     Heart attack Mother     Heart attack Father     Diabetes Sister      insulin dependent    Heart disease Sister      CABG    Diabetes Brother      insulin dependent    Heart disease Brother      CABG       Meds/Allergies     Prescriptions Prior to Admission   Medication    acetaminophen (TYLENOL) 325 mg tablet    Calcium Carb-Cholecalciferol (CALCIUM 600-D PO)    Cholecalciferol (VITAMIN D PO)    colesevelam (WELCHOL) 625 mg tablet    GLUCOSAMINE SULFATE PO    Lidocaine (LIDODERM EX)    lidocaine (XYLOCAINE) 5 % ointment    nebivolol (BYSTOLIC) 10 mg tablet    nebivolol (BYSTOLIC) 5 mg tablet    ondansetron (ZOFRAN) 4 mg tablet    pantoprazole (PROTONIX) 40 mg tablet    risedronate (ACTONEL) 150 MG tablet    Warfarin Sodium (COUMADIN PO)    albuterol (PROVENTIL HFA,VENTOLIN HFA) 90 mcg/act inhaler     Current Facility-Administered Medications   Medication Dose Route Frequency    acetaminophen (TYLENOL) tablet 650 mg  650 mg Oral Q6H PRN    albuterol (PROVENTIL HFA,VENTOLIN HFA) inhaler 1 puff  1 puff Inhalation Q6H PRN    calcium carbonate-vitamin D (OSCAL-D) 500 mg-200 units per tablet 1 tablet  1 tablet Oral Daily With Breakfast    cholestyramine sugar free (QUESTRAN LIGHT) packet 4 g  4 g Oral BID    ciprofloxacin (CIPRO) IVPB (premix) 400 mg  400 mg Intravenous Q12H    influenza inactivated quadrivalent vaccine (FLULAVAL) IM injection 0 5 mL  0 5 mL Intramuscular Prior to discharge    lidocaine (LIDODERM) 5 % patch 1 patch  1 patch Transdermal Daily    metroNIDAZOLE (FLAGYL) IVPB (premix) 500 mg  500 mg Intravenous Q8H    nebivolol (BYSTOLIC) tablet 10 mg  10 mg Oral QAM    nebivolol (BYSTOLIC) tablet 5 mg  5 mg Oral Daily With Lunch    ondansetron (ZOFRAN) injection 4 mg  4 mg Intravenous Q6H PRN    pantoprazole (PROTONIX) EC tablet 40 mg  40 mg Oral QAM    sodium chloride 0 9 % infusion  50 mL/hr Intravenous Continuous       Allergies   Allergen Reactions    Codeine Other (See Comments)     Patient experience chest pain    Cortisone Rash     Severe rash and redness    Pneumovax [Pneumococcal Polysaccharide Vaccine] Swelling     Swelling, red rash and pain at injection site    Penicillins     Asa [Aspirin] GI Intolerance           Objective     Blood pressure 136/63, pulse 89, temperature 98 4 °F (36 9 °C), temperature source Tympanic, resp  rate 18, height 5' (1 524 m), weight 48 9 kg (107 lb 14 4 oz), SpO2 96 %, not currently breastfeeding        Intake/Output Summary (Last 24 hours) at 05/20/18 0742  Last data filed at 05/20/18 1440   Gross per 24 hour   Intake          2993 75 ml   Output              500 ml   Net          2493 75 ml         PHYSICAL EXAM:      General Appearance:   Alert, cooperative, no distress, appears stated age    HEENT:   Normocephalic, atraumatic, anicteric      Neck:  Supple, symmetrical, trachea midline, no adenopathy;    thyroid: no enlargement/tenderness/nodules; no carotid  bruit or JVD    Lungs:   Clear to auscultation bilaterally; no rales, rhonchi or wheezing; respirations unlabored    Heart[de-identified]   S1 and S2 normal; regular rate and rhythm; no murmur, rub, or gallop     Abdomen:   Soft, non-tender, non-distended; normal bowel sounds; no masses, no organomegaly    Genitalia:   Deferred    Rectal:   Deferred    Extremities:  No cyanosis, clubbing or edema    Pulses:  2+ and symmetric all extremities    Skin:  Skin color, texture, turgor normal, no rashes or lesions    Lymph nodes:  No palpable cervical, axillary or inguinal lymphadenopathy        Lab Results:   Admission on 05/19/2018   Component Date Value    WBC 05/19/2018 6 48     RBC 05/19/2018 4 19     Hemoglobin 05/19/2018 10 6*    Hematocrit 05/19/2018 35 0     MCV 05/19/2018 84     MCH 05/19/2018 25 3*    MCHC 05/19/2018 30 3*    RDW 05/19/2018 14 0     MPV 05/19/2018 9 2     Platelets 62/60/3623 328     nRBC 05/19/2018 0     Neutrophils Relative 05/19/2018 61     Immat GRANS % 05/19/2018 1     Lymphocytes Relative 05/19/2018 30     Monocytes Relative 05/19/2018 7     Eosinophils Relative 05/19/2018 0     Basophils Relative 05/19/2018 1     Neutrophils Absolute 05/19/2018 3 98     Immature Grans Absolute 05/19/2018 0 03     Lymphocytes Absolute 05/19/2018 1 97     Monocytes Absolute 05/19/2018 0 45     Eosinophils Absolute 05/19/2018 0 01     Basophils Absolute 05/19/2018 0 04     Sodium 05/19/2018 137     Potassium 05/19/2018 4 1     Chloride 05/19/2018 100     CO2 05/19/2018 21     Anion Gap 05/19/2018 16*  BUN 05/19/2018 10     Creatinine 05/19/2018 0 99     Glucose 05/19/2018 109     Calcium 05/19/2018 8 7     AST 05/19/2018 27     ALT 05/19/2018 30     Alkaline Phosphatase 05/19/2018 46     Total Protein 05/19/2018 8 3*    Albumin 05/19/2018 3 9     Total Bilirubin 05/19/2018 0 30     eGFR 05/19/2018 55     Lipase 05/19/2018 113     Troponin I 05/19/2018 <0 02     Protime 05/19/2018 25 5*    INR 05/19/2018 2 41*    PTT 05/19/2018 28     MRSA Culture Only 05/19/2018 No Methicillin Resistant Staphlyococcus aureus (MRSA) isolated     Cholesterol 05/19/2018 160     Triglycerides 05/19/2018 170*    HDL, Direct 05/19/2018 52     LDL Calculated 05/19/2018 74     Non-HDL-Chol (CHOL-HDL) 05/19/2018 108     Troponin I 05/19/2018 <0 02     Ventricular Rate 05/19/2018 85     Atrial Rate 05/19/2018 85     ND Interval 05/19/2018 152     QRSD Interval 05/19/2018 78     QT Interval 05/19/2018 400     QTC Interval 05/19/2018 476     P Axis 05/19/2018 36     QRS Axis 05/19/2018 0     T Wave Axis 05/19/2018 13     Troponin I 05/19/2018 <0 02     Sodium 05/20/2018 139     Potassium 05/20/2018 3 6     Chloride 05/20/2018 106     CO2 05/20/2018 23     Anion Gap 05/20/2018 10     BUN 05/20/2018 7     Creatinine 05/20/2018 0 86     Glucose 05/20/2018 96     Glucose, Fasting 05/20/2018 96     Calcium 05/20/2018 8 0*    eGFR 05/20/2018 65     WBC 05/20/2018 5 47     RBC 05/20/2018 3 82     Hemoglobin 05/20/2018 9 7*    Hematocrit 05/20/2018 31 8*    MCV 05/20/2018 83     MCH 05/20/2018 25 4*    MCHC 05/20/2018 30 5*    RDW 05/20/2018 14 3     Platelets 20/63/4563 323     MPV 05/20/2018 9 5     Protime 05/20/2018 23 3*    INR 05/20/2018 2 20*       Imaging Studies: I have personally reviewed pertinent imaging studies  Xr Chest 1 View Portable    Result Date: 5/19/2018  Impression: Right perihilar lung mass again visualized  No new consolidation   Workstation performed: [id number] Ct Abdomen Pelvis W Contrast    Result Date: 5/19/2018  Impression: Right middle lobe lung neoplasm and worsening subcarinal adenopathy/metastatic disease  Small right pleural effusion  Mild wall thickening of the sigmoid colon and descending colon could relate to normal underdistention or a very mild colitis as suspected  Other unchanged chronic findings as above  Workstation performed: [id number]       ASSESSMENT/ PLAN:    Principal Problem:    Chest pain  Active Problems:    Non-small cell carcinoma of left lung (HCC)    Gastroenteritis    Atrial fibrillation (HCC)    Borderline diabetes      1  Abdominal pain associated with nausea, vomiting and diarrhea-appear to be most likely from gastroenteritis    Mild left-sided colon thickening is from underdistention and doubt for colitis     -advance diet as tolerated    -check stool studies including C diff, cultures    -may discontinue antibiotics and observe    -symptomatic treatment    Discussed with Dr Kolby Wharton

## 2018-05-21 NOTE — PROGRESS NOTES
Progress Note - Radha Silva 68 y o  female MRN: [id number]    Unit/Bed#: [address] [place] [address] Encounter: [id number]        Subjective:   Patient reports feeling better than yesterday, still somewhat weak, she did tolerate some liquids earlier without vomiting, she says she had 1 vomiting episode yesterday  Denies any abdominal pain or chest pain at this time  No rectal bleeding, melena or diarrhea  Objective:     Vitals: Blood pressure 130/68, pulse 71, temperature 98 7 °F (37 1 °C), temperature source Oral, resp  rate 18, height 5' (1 524 m), weight 48 9 kg (107 lb 14 4 oz), SpO2 94 %, not currently breastfeeding  ,Body mass index is 21 07 kg/m²  Intake/Output Summary (Last 24 hours) at 05/21/18 0855  Last data filed at 05/20/18 1440   Gross per 24 hour   Intake              450 ml   Output              500 ml   Net              -50 ml       Physical Exam:   General appearance: alert, appears stated age and cooperative  Lungs: clear to auscultation bilaterally, no labored breathing/accessory muscle use  Heart: regular rate and rhythm, S1, S2 normal, no murmur, click, rub or gallop  Abdomen: soft, non-tender; bowel sounds normal; no masses,  no organomegaly  Extremities: no edema    Invasive Devices     Peripheral Intravenous Line            Peripheral IV 05/20/18 Left Antecubital less than 1 day                Lab, Imaging and other studies: I have personally reviewed pertinent reports      Admission on 05/19/2018   Component Date Value    WBC 05/19/2018 6 48     RBC 05/19/2018 4 19     Hemoglobin 05/19/2018 10 6*    Hematocrit 05/19/2018 35 0     MCV 05/19/2018 84     MCH 05/19/2018 25 3*    MCHC 05/19/2018 30 3*    RDW 05/19/2018 14 0     MPV 05/19/2018 9 2     Platelets 61/26/4801 328     nRBC 05/19/2018 0     Neutrophils Relative 05/19/2018 61     Immat GRANS % 05/19/2018 1     Lymphocytes Relative 05/19/2018 30     Monocytes Relative 05/19/2018 7     Eosinophils Relative 05/19/2018 0     Basophils Relative 05/19/2018 1     Neutrophils Absolute 05/19/2018 3 98     Immature Grans Absolute 05/19/2018 0 03     Lymphocytes Absolute 05/19/2018 1 97     Monocytes Absolute 05/19/2018 0 45     Eosinophils Absolute 05/19/2018 0 01     Basophils Absolute 05/19/2018 0 04     Sodium 05/19/2018 137     Potassium 05/19/2018 4 1     Chloride 05/19/2018 100     CO2 05/19/2018 21     Anion Gap 05/19/2018 16*    BUN 05/19/2018 10     Creatinine 05/19/2018 0 99     Glucose 05/19/2018 109     Calcium 05/19/2018 8 7     AST 05/19/2018 27     ALT 05/19/2018 30     Alkaline Phosphatase 05/19/2018 46     Total Protein 05/19/2018 8 3*    Albumin 05/19/2018 3 9     Total Bilirubin 05/19/2018 0 30     eGFR 05/19/2018 55     Lipase 05/19/2018 113     Troponin I 05/19/2018 <0 02     Protime 05/19/2018 25 5*    INR 05/19/2018 2 41*    PTT 05/19/2018 28     MRSA Culture Only 05/19/2018 No Methicillin Resistant Staphlyococcus aureus (MRSA) isolated     Cholesterol 05/19/2018 160     Triglycerides 05/19/2018 170*    HDL, Direct 05/19/2018 52     LDL Calculated 05/19/2018 74     Non-HDL-Chol (CHOL-HDL) 05/19/2018 108     Salmonella sp PCR 05/20/2018 None Detected     Shigella sp/Enteroinvasi* 05/20/2018 None Detected     Campylobacter sp (jejuni* 05/20/2018 None Detected     Shiga toxin 1/Shiga jonnathan* 05/20/2018 None Detected     Troponin I 05/19/2018 <0 02     Ventricular Rate 05/19/2018 85     Atrial Rate 05/19/2018 85     MD Interval 05/19/2018 152     QRSD Interval 05/19/2018 78     QT Interval 05/19/2018 400     QTC Interval 05/19/2018 476     P Axis 05/19/2018 36     QRS Axis 05/19/2018 0     T Wave Axis 05/19/2018 13     Troponin I 05/19/2018 <0 02     Sodium 05/20/2018 139     Potassium 05/20/2018 3 6     Chloride 05/20/2018 106     CO2 05/20/2018 23     Anion Gap 05/20/2018 10     BUN 05/20/2018 7     Creatinine 05/20/2018 0 86     Glucose 05/20/2018 96     Glucose, Fasting 05/20/2018 96     Calcium 05/20/2018 8 0*    eGFR 05/20/2018 65     WBC 05/20/2018 5 47     RBC 05/20/2018 3 82     Hemoglobin 05/20/2018 9 7*    Hematocrit 05/20/2018 31 8*    MCV 05/20/2018 83     MCH 05/20/2018 25 4*    MCHC 05/20/2018 30 5*    RDW 05/20/2018 14 3     Platelets 51/16/8049 323     MPV 05/20/2018 9 5     Protime 05/20/2018 23 3*    INR 05/20/2018 2 20*    Protime 05/21/2018 23 1*    INR 05/21/2018 2 18*    WBC 05/21/2018 5 33     RBC 05/21/2018 3 64*    Hemoglobin 05/21/2018 9 2*    Hematocrit 05/21/2018 30 5*    MCV 05/21/2018 84     MCH 05/21/2018 25 3*    MCHC 05/21/2018 30 2*    RDW 05/21/2018 14 4     MPV 05/21/2018 9 4     Platelets 20/08/6501 305     nRBC 05/21/2018 0     Neutrophils Relative 05/21/2018 55     Immat GRANS % 05/21/2018 0     Lymphocytes Relative 05/21/2018 32     Monocytes Relative 05/21/2018 11     Eosinophils Relative 05/21/2018 1     Basophils Relative 05/21/2018 1     Neutrophils Absolute 05/21/2018 2 96     Immature Grans Absolute 05/21/2018 0 02     Lymphocytes Absolute 05/21/2018 1 70     Monocytes Absolute 05/21/2018 0 56     Eosinophils Absolute 05/21/2018 0 06     Basophils Absolute 05/21/2018 0 03      Results for Jesus Gao (MRN [id number]) as of 5/21/2018 08:55   Ref   Range 5/19/2018 14:46 5/19/2018 14:53 5/19/2018 21:23 5/20/2018 04:34 5/20/2018 04:35 5/20/2018 10:28 5/21/2018 05:48   eGFR Latest Units: ml/min/1 73sq m     65     Sodium Latest Ref Range: 136 - 145 mmol/L     139     Potassium Latest Ref Range: 3 5 - 5 3 mmol/L     3 6     Chloride Latest Ref Range: 100 - 108 mmol/L     106     CO2 Latest Ref Range: 21 - 32 mmol/L     23     Anion Gap Latest Ref Range: 4 - 13 mmol/L     10     BUN Latest Ref Range: 5 - 25 mg/dL     7     Creatinine Latest Ref Range: 0 60 - 1 30 mg/dL     0 86     Glucose Latest Ref Range: 65 - 140 mg/dL     96     GLUCOSE FASTING Latest Ref Range: 65 - 99 mg/dL     96 Calcium Latest Ref Range: 8 3 - 10 1 mg/dL     8 0 (L)     Troponin I Latest Ref Range: <=0 04 ng/mL  <0 02 <0 02       Cholesterol Latest Ref Range: 50 - 200 mg/dL 160         Triglycerides Latest Ref Range: <=150 mg/dL 170 (H)         HDL Latest Ref Range: 40 - 60 mg/dL 52         NON-HDL-CHOL (CHOL-HDL) Latest Units: mg/dl 108         LDL Calculated Latest Ref Range: 0 - 100 mg/dL 74         WBC Latest Ref Range: 4 31 - 10 16 Thousand/uL     5 47  5 33   RBC Latest Ref Range: 3 81 - 5 12 Million/uL     3 82  3 64 (L)   Hemoglobin Latest Ref Range: 11 5 - 15 4 g/dL     9 7 (L)  9 2 (L)   Hematocrit Latest Ref Range: 34 8 - 46 1 %     31 8 (L)  30 5 (L)   MCV Latest Ref Range: 82 - 98 fL     83  84   MCH Latest Ref Range: 26 8 - 34 3 pg     25 4 (L)  25 3 (L)   MCHC Latest Ref Range: 31 4 - 37 4 g/dL     30 5 (L)  30 2 (L)   RDW Latest Ref Range: 11 6 - 15 1 %     14 3  14 4   Platelets Latest Ref Range: 149 - 390 Thousands/uL     323  305   MPV Latest Ref Range: 8 9 - 12 7 fL     9 5  9 4   nRBC Latest Units: /100 WBCs       0   Neutrophils Relative Latest Ref Range: 43 - 75 %       55   Lymphocytes Relative Latest Ref Range: 14 - 44 %       32   Monocytes Relative Latest Ref Range: 4 - 12 %       11   Eosinophils Relative Latest Ref Range: 0 - 6 %       1   Basophils Relative Latest Ref Range: 0 - 1 %       1   Neutrophils Absolute Latest Ref Range: 1 85 - 7 62 Thousands/µL       2 96   Lymphocytes Absolute Latest Ref Range: 0 60 - 4 47 Thousands/µL       1 70   Monocytes Absolute Latest Ref Range: 0 17 - 1 22 Thousand/µL       0 56   Eosinophils Absolute Latest Ref Range: 0 00 - 0 61 Thousand/µL       0 06   Basophils Absolute Latest Ref Range: 0 00 - 0 10 Thousands/µL       0 03   Protime Latest Ref Range: 9 4 - 11 7 seconds    23 3 (H)   23 1 (H)   INR Latest Ref Range: 0 86 - 1 16     2 20 (H)   2 18 (H)   Immature Grans Absolute Latest Ref Range: 0 00 - 0 20 Thousand/uL       0 02   Immat GRANS % Latest Ref Range: 0 - 2 %       0   ENTERIC BACTERIAL PANEL BY PCR Unknown      Rpt        Assessment/Plan:    1  Acute onset of abdominal pain, nausea, vomiting, and diarrhea; most likely representing infectious gastroenteritis  She appears to be clinically improving at this time     -monitor temperature, white blood cell count, abdominal exam  -should not require antibiotics at this time, although may consider restarting if patient shows signs of systemic infection  Will discontinue Cipro and Flagyl for now  -gradually advance diet as tolerated  -follow up on stool studies    Enteric panel is noted negative; c difficile pending

## 2018-05-21 NOTE — PLAN OF CARE
CARDIOVASCULAR - ADULT     Maintains optimal cardiac output and hemodynamic stability Progressing     Absence of cardiac dysrhythmias or at baseline rhythm Progressing        DISCHARGE PLANNING - CARE MANAGEMENT     Discharge to post-acute care or home with appropriate resources Progressing        GASTROINTESTINAL - ADULT     Minimal or absence of nausea and/or vomiting Progressing     Maintains adequate nutritional intake Progressing        Potential for Falls     Patient will remain free of falls Progressing

## 2018-05-21 NOTE — ASSESSMENT & PLAN NOTE
Currently in sinus rhythm  Continue Bystolic   Coumadin on hold in anticipation for cardiac catheterization

## 2018-05-21 NOTE — ASSESSMENT & PLAN NOTE
Patient having nausea, vomiting and diarrhea  CT scan of the abdomen and pelvis test done which showed possible mild sigmoid colitis  Patient was started on IV ciprofloxacin and Flagyl which was stopped later as her stool studies were negative and diarrhea were improving  Patient was advanced to regular diet

## 2018-05-21 NOTE — PROGRESS NOTES
Progress Note - Viv Cesar 1940, 68 y o  female MRN: [id number]    Unit/Bed#: [address] [address] Encounter: [id number]    Primary Care Provider: Wilfredo Abarca MD   Date and time admitted to hospital: 5/19/2018  9:01 AM        * Chest pain   Assessment & Plan    Presentation is atypical  Serial cardiac enzymes were negative  Patient did have a nuclear stress test in January 2017 which was normal  Patient does have extensive family history  Cardiac catheterization was canceled today as INR was elevated  Possible catheterization in a m  Gastroenteritis   Assessment & Plan    Patient having nausea, vomiting and diarrhea  CT scan of the abdomen and pelvis test done which showed possible mild sigmoid colitis  Patient was started on IV ciprofloxacin and Flagyl which was stopped later as her stool studies were negative and diarrhea were improving  Patient was advanced to regular diet        Atrial fibrillation [facility])   Assessment & Plan    Currently in sinus rhythm  Continue Bystolic   Coumadin on hold in anticipation for cardiac catheterization  Non-small cell carcinoma of left lung ([address] )   Assessment & Plan    With metastasis  Patient refused chemotherapy        Borderline diabetes   Assessment & Plan    Diet controlled            VTE Pharmacologic Prophylaxis:   Pharmacologic: Warfarin (Coumadin)  Mechanical VTE Prophylaxis in Place: Yes    Patient Centered Rounds: I have performed bedside rounds with nursing staff today  Discussions with Specialists or Other Care Team Provider: Dr Horacio Victoria and Discussions with Family / Patient: Yes    Time Spent for Care: 30 minutes  More than 50% of total time spent on counseling and coordination of care as described above      Current Length of Stay: 1 day(s)    Current Patient Status: Inpatient   Certification Statement: The patient will continue to require additional inpatient hospital stay due to Vomiting and chest pain    Discharge Plan: Home    Code Status: Level 3 - DNAR and DNI      Subjective:   Patient is still complaining of intermittent chest pain especially on the right  Patient had 1 episode of vomiting today after cholestyramine  Denies any abdominal pain or diarrhea    Objective:     Vitals:   Temp (24hrs), Av 2 °F (36 8 °C), Min:97 3 °F (36 3 °C), Max:98 7 °F (37 1 °C)    HR:  [71-89] 75  Resp:  [18] 18  BP: (106-136)/(53-68) 123/56  SpO2:  [92 %-96 %] 95 %  Body mass index is 21 07 kg/m²  Input and Output Summary (last 24 hours):     No intake or output data in the 24 hours ending 18    Physical Exam:     Physical Exam   Constitutional: No distress  HENT:   Head: Normocephalic and atraumatic  Nose: Nose normal    Eyes: Conjunctivae and EOM are normal  Pupils are equal, round, and reactive to light  Neck: Normal range of motion  Neck supple  No JVD present  Cardiovascular: Normal rate, regular rhythm and normal heart sounds  Exam reveals no gallop and no friction rub  No murmur heard  Pulmonary/Chest: Effort normal and breath sounds normal  No respiratory distress  She has no wheezes  She has no rales  She exhibits no tenderness  Abdominal: Soft  Bowel sounds are normal  She exhibits no distension  There is no tenderness  There is no rebound and no guarding  Musculoskeletal: She exhibits no edema  Neurological: She is alert  No cranial nerve deficit  Skin: Skin is warm and dry  No rash noted  Psychiatric: She has a normal mood and affect         Additional Data:     Labs:      Results from last 7 days  Lab Units 18  0548   WBC Thousand/uL 5 33   HEMOGLOBIN g/dL 9 2*   HEMATOCRIT % 30 5*   PLATELETS Thousands/uL 305   NEUTROS PCT % 55   LYMPHS PCT % 32   MONOS PCT % 11   EOS PCT % 1       Results from last 7 days  Lab Units 18  0435 18  0948   SODIUM mmol/L 139 137   POTASSIUM mmol/L 3 6 4 1   CHLORIDE mmol/L 106 100   CO2 mmol/L 23 21   BUN mg/dL 7 10 CREATININE mg/dL 0 86 0 99   CALCIUM mg/dL 8 0* 8 7   TOTAL PROTEIN g/dL  --  8 3*   BILIRUBIN TOTAL mg/dL  --  0 30   ALK PHOS U/L  --  46   ALT U/L  --  30   AST U/L  --  27   GLUCOSE RANDOM mg/dL 96 109       Results from last 7 days  Lab Units 05/21/18  0548   INR  2 18*       * I Have Reviewed All Lab Data Listed Above  * Additional Pertinent Lab Tests Reviewed: [facility]        Recent Cultures (last 7 days):           Last 24 Hours Medication List:     Current Facility-Administered Medications:  acetaminophen 650 mg Oral Q6H PRN Jose Sánchez MD    albuterol 1 puff Inhalation Q6H PRN Jose Sánchez MD    calcium carbonate-vitamin D 1 tablet Oral Daily With Breakfast Jose Sánchez MD    influenza vaccine 0 5 mL Intramuscular Prior to discharge Jose Sánchez MD    lidocaine 1 patch Transdermal Daily Jose Sánchez MD    nebivolol 10 mg Oral BHAVIN Sánchez MD    nebivolol 5 mg Oral Daily With Maura Rodriguez MD    ondansetron 4 mg Intravenous Q6H PRN Jose Sánchez MD    pantoprazole 40 mg Oral QAM Jose Sánchez MD    sodium chloride 50 mL/hr Intravenous Continuous Jose Sánchez MD Last Rate: 50 mL/hr (05/20/18 1726)        Today, Patient Was Seen By: Jose Sánchez MD    ** Please Note: Dictation voice to text software may have been used in the creation of this document   **

## 2018-05-22 PROBLEM — I48.0 PAROXYSMAL ATRIAL FIBRILLATION (HCC): Status: ACTIVE | Noted: 2018-05-19

## 2018-05-22 LAB
BASOPHILS # BLD AUTO: 0.02 THOUSANDS/ΜL (ref 0–0.1)
BASOPHILS NFR BLD AUTO: 0 % (ref 0–1)
EOSINOPHIL # BLD AUTO: 0.08 THOUSAND/ΜL (ref 0–0.61)
EOSINOPHIL NFR BLD AUTO: 1 % (ref 0–6)
ERYTHROCYTE [DISTWIDTH] IN BLOOD BY AUTOMATED COUNT: 14.2 % (ref 11.6–15.1)
HCT VFR BLD AUTO: 33.5 % (ref 34.8–46.1)
HGB BLD-MCNC: 10.1 G/DL (ref 11.5–15.4)
IMM GRANULOCYTES # BLD AUTO: 0.02 THOUSAND/UL (ref 0–0.2)
IMM GRANULOCYTES NFR BLD AUTO: 0 % (ref 0–2)
INR PPP: 1.65 (ref 0.86–1.16)
LYMPHOCYTES # BLD AUTO: 1.66 THOUSANDS/ΜL (ref 0.6–4.47)
LYMPHOCYTES NFR BLD AUTO: 28 % (ref 14–44)
MCH RBC QN AUTO: 25.5 PG (ref 26.8–34.3)
MCHC RBC AUTO-ENTMCNC: 30.1 G/DL (ref 31.4–37.4)
MCV RBC AUTO: 85 FL (ref 82–98)
MONOCYTES # BLD AUTO: 0.59 THOUSAND/ΜL (ref 0.17–1.22)
MONOCYTES NFR BLD AUTO: 10 % (ref 4–12)
NEUTROPHILS # BLD AUTO: 3.52 THOUSANDS/ΜL (ref 1.85–7.62)
NEUTS SEG NFR BLD AUTO: 61 % (ref 43–75)
NRBC BLD AUTO-RTO: 0 /100 WBCS
PLATELET # BLD AUTO: 314 THOUSANDS/UL (ref 149–390)
PMV BLD AUTO: 9.3 FL (ref 8.9–12.7)
PROTHROMBIN TIME: 17.4 SECONDS (ref 9.4–11.7)
RBC # BLD AUTO: 3.96 MILLION/UL (ref 3.81–5.12)
WBC # BLD AUTO: 5.89 THOUSAND/UL (ref 4.31–10.16)

## 2018-05-22 PROCEDURE — 85025 COMPLETE CBC W/AUTO DIFF WBC: CPT | Performed by: INTERNAL MEDICINE

## 2018-05-22 PROCEDURE — 99232 SBSQ HOSP IP/OBS MODERATE 35: CPT | Performed by: FAMILY MEDICINE

## 2018-05-22 PROCEDURE — 85610 PROTHROMBIN TIME: CPT | Performed by: INTERNAL MEDICINE

## 2018-05-22 PROCEDURE — 99231 SBSQ HOSP IP/OBS SF/LOW 25: CPT | Performed by: INTERNAL MEDICINE

## 2018-05-22 RX ADMIN — PANTOPRAZOLE SODIUM 40 MG: 40 TABLET, DELAYED RELEASE ORAL at 09:36

## 2018-05-22 RX ADMIN — SODIUM CHLORIDE 50 ML/HR: 0.9 INJECTION, SOLUTION INTRAVENOUS at 19:39

## 2018-05-22 RX ADMIN — NEBIVOLOL HYDROCHLORIDE 10 MG: 10 TABLET ORAL at 09:37

## 2018-05-22 RX ADMIN — NEBIVOLOL HYDROCHLORIDE 5 MG: 5 TABLET ORAL at 12:39

## 2018-05-22 RX ADMIN — LIDOCAINE 1 PATCH: 50 PATCH CUTANEOUS at 09:36

## 2018-05-22 NOTE — PROGRESS NOTES
Progress Note - Cardiology   Priya Baez 68 y o  female MRN: [id number]  Unit/Bed#: [address] [place] [address] Encounter: [id number]    Assessment/Plan:  Chest pain rule out acute coronary syndrome-patient with recurrent chest discomfort  Repeat admission for acute chest pain  Previous history of negative nuclear stress test   There were given her high pretest probability for underlying CAD will perform diagnostic cardiac catheterization to rule out significant CAD  Discuss with patient the benefits of cardiac cath to rule out obstructive coronary artery disease vs the risk not limited to major bleeding, stroke, worsen mi, or death and they have agreed to the procedure/consent  Place patient NPO after midnight    Gastroenteritis/mild colitis     Right perihilar lung mass-patient with history of non-small cell lung cancer with metastasis  Has declined chemotherapy      Paroxysmal atrial fibrillation- continue Bystolic plus hold anticoagulation given possible need for cardiac catheterization     Borderline diabetes       Subjective/Objective   Still with nausea and vomiting  Also reports some substernal chest heaviness with also retrosternal sharp pain  Objective:     Vitals: /59   Pulse 77   Temp 99 3 °F (37 4 °C) (Temporal)   Resp 18   Ht 5' (1 524 m)   Wt 48 9 kg (107 lb 14 4 oz)   SpO2 94%   BMI 21 07 kg/m²   Vitals:    05/19/18 0904 05/19/18 1140   Weight: 48 1 kg (106 lb) 48 9 kg (107 lb 14 4 oz)     Orthostatic Blood Pressures      Most Recent Value   Blood Pressure  128/59 filed at 05/22/2018 1616   Patient Position - Orthostatic VS  Lying filed at 05/22/2018 0900          No intake or output data in the 24 hours ending 05/22/18 1717    Invasive Devices     Peripheral Intravenous Line            Peripheral IV 05/20/18 Left Antecubital 1 day                Review of Systems: reviewed    Lab Results: I have personally reviewed pertinent lab results         Imaging: I have personally reviewed pertinent reports  EKG: reviewed  VTE Pharmacologic Prophylaxis: Sequential compression device (Venodyne)   VTE Mechanical Prophylaxis: sequential compression device    Counseling / Coordination of Care  Total time spent today 30 minutes  Greater than 50% of total time was spent with the patient and / or family counseling and / or coordination of care   A description of the counseling / coordination of care: chest pain/acs

## 2018-05-23 ENCOUNTER — APPOINTMENT (INPATIENT)
Dept: NON INVASIVE DIAGNOSTICS | Facility: HOSPITAL | Age: 78
DRG: 392 | End: 2018-05-23
Payer: MEDICARE

## 2018-05-23 VITALS
OXYGEN SATURATION: 94 % | RESPIRATION RATE: 18 BRPM | HEART RATE: 79 BPM | SYSTOLIC BLOOD PRESSURE: 137 MMHG | WEIGHT: 107.9 LBS | HEIGHT: 60 IN | DIASTOLIC BLOOD PRESSURE: 60 MMHG | TEMPERATURE: 98.2 F | BODY MASS INDEX: 21.18 KG/M2

## 2018-05-23 PROBLEM — K52.9 GASTROENTERITIS: Status: RESOLVED | Noted: 2018-05-19 | Resolved: 2018-05-23

## 2018-05-23 LAB
ANION GAP SERPL CALCULATED.3IONS-SCNC: 9 MMOL/L (ref 4–13)
ATRIAL RATE: 90 BPM
ATRIAL RATE: 90 BPM
BUN SERPL-MCNC: 5 MG/DL (ref 5–25)
CALCIUM SERPL-MCNC: 7.6 MG/DL (ref 8.3–10.1)
CHLORIDE SERPL-SCNC: 106 MMOL/L (ref 100–108)
CO2 SERPL-SCNC: 24 MMOL/L (ref 21–32)
CREAT SERPL-MCNC: 0.68 MG/DL (ref 0.6–1.3)
GFR SERPL CREATININE-BSD FRML MDRD: 85 ML/MIN/1.73SQ M
GLUCOSE SERPL-MCNC: 109 MG/DL (ref 65–140)
GLUCOSE SERPL-MCNC: 110 MG/DL (ref 65–140)
INR PPP: 1.3 (ref 0.86–1.16)
P AXIS: 47 DEGREES
P AXIS: 60 DEGREES
POTASSIUM SERPL-SCNC: 3.3 MMOL/L (ref 3.5–5.3)
PR INTERVAL: 146 MS
PR INTERVAL: 148 MS
PROTHROMBIN TIME: 13.7 SECONDS (ref 9.4–11.7)
QRS AXIS: 11 DEGREES
QRS AXIS: 15 DEGREES
QRSD INTERVAL: 88 MS
QRSD INTERVAL: 90 MS
QT INTERVAL: 392 MS
QT INTERVAL: 394 MS
QTC INTERVAL: 479 MS
QTC INTERVAL: 481 MS
SODIUM SERPL-SCNC: 139 MMOL/L (ref 136–145)
T WAVE AXIS: 16 DEGREES
T WAVE AXIS: 29 DEGREES
VENTRICULAR RATE: 90 BPM
VENTRICULAR RATE: 90 BPM

## 2018-05-23 PROCEDURE — C1894 INTRO/SHEATH, NON-LASER: HCPCS

## 2018-05-23 PROCEDURE — 93005 ELECTROCARDIOGRAM TRACING: CPT

## 2018-05-23 PROCEDURE — 93458 L HRT ARTERY/VENTRICLE ANGIO: CPT | Performed by: INTERNAL MEDICINE

## 2018-05-23 PROCEDURE — 99152 MOD SED SAME PHYS/QHP 5/>YRS: CPT | Performed by: INTERNAL MEDICINE

## 2018-05-23 PROCEDURE — 82948 REAGENT STRIP/BLOOD GLUCOSE: CPT

## 2018-05-23 PROCEDURE — B2111ZZ FLUOROSCOPY OF MULTIPLE CORONARY ARTERIES USING LOW OSMOLAR CONTRAST: ICD-10-PCS | Performed by: INTERNAL MEDICINE

## 2018-05-23 PROCEDURE — 93010 ELECTROCARDIOGRAM REPORT: CPT | Performed by: INTERNAL MEDICINE

## 2018-05-23 PROCEDURE — 4A023N7 MEASUREMENT OF CARDIAC SAMPLING AND PRESSURE, LEFT HEART, PERCUTANEOUS APPROACH: ICD-10-PCS | Performed by: INTERNAL MEDICINE

## 2018-05-23 PROCEDURE — 80048 BASIC METABOLIC PNL TOTAL CA: CPT | Performed by: FAMILY MEDICINE

## 2018-05-23 PROCEDURE — 99239 HOSP IP/OBS DSCHRG MGMT >30: CPT | Performed by: FAMILY MEDICINE

## 2018-05-23 PROCEDURE — B2151ZZ FLUOROSCOPY OF LEFT HEART USING LOW OSMOLAR CONTRAST: ICD-10-PCS | Performed by: INTERNAL MEDICINE

## 2018-05-23 PROCEDURE — 99231 SBSQ HOSP IP/OBS SF/LOW 25: CPT | Performed by: INTERNAL MEDICINE

## 2018-05-23 PROCEDURE — 85610 PROTHROMBIN TIME: CPT | Performed by: FAMILY MEDICINE

## 2018-05-23 PROCEDURE — 93458 L HRT ARTERY/VENTRICLE ANGIO: CPT

## 2018-05-23 RX ORDER — POTASSIUM CHLORIDE 14.9 MG/ML
20 INJECTION INTRAVENOUS ONCE
Status: DISCONTINUED | OUTPATIENT
Start: 2018-05-23 | End: 2018-05-23

## 2018-05-23 RX ORDER — FENTANYL CITRATE 50 UG/ML
INJECTION, SOLUTION INTRAMUSCULAR; INTRAVENOUS CODE/TRAUMA/SEDATION MEDICATION
Status: COMPLETED | OUTPATIENT
Start: 2018-05-23 | End: 2018-05-23

## 2018-05-23 RX ORDER — MIDAZOLAM HYDROCHLORIDE 1 MG/ML
INJECTION INTRAMUSCULAR; INTRAVENOUS CODE/TRAUMA/SEDATION MEDICATION
Status: COMPLETED | OUTPATIENT
Start: 2018-05-23 | End: 2018-05-23

## 2018-05-23 RX ORDER — ONDANSETRON 2 MG/ML
4 INJECTION INTRAMUSCULAR; INTRAVENOUS ONCE
Status: DISCONTINUED | OUTPATIENT
Start: 2018-05-23 | End: 2018-05-23

## 2018-05-23 RX ORDER — POTASSIUM CHLORIDE 14.9 MG/ML
20 INJECTION INTRAVENOUS ONCE
Status: COMPLETED | OUTPATIENT
Start: 2018-05-23 | End: 2018-05-23

## 2018-05-23 RX ORDER — LIDOCAINE HYDROCHLORIDE 10 MG/ML
INJECTION, SOLUTION INFILTRATION; PERINEURAL CODE/TRAUMA/SEDATION MEDICATION
Status: COMPLETED | OUTPATIENT
Start: 2018-05-23 | End: 2018-05-23

## 2018-05-23 RX ORDER — ONDANSETRON 2 MG/ML
4 INJECTION INTRAMUSCULAR; INTRAVENOUS ONCE
Status: DISCONTINUED | OUTPATIENT
Start: 2018-05-23 | End: 2018-05-23 | Stop reason: HOSPADM

## 2018-05-23 RX ORDER — ONDANSETRON 2 MG/ML
INJECTION INTRAMUSCULAR; INTRAVENOUS CODE/TRAUMA/SEDATION MEDICATION
Status: COMPLETED | OUTPATIENT
Start: 2018-05-23 | End: 2018-05-23

## 2018-05-23 RX ADMIN — FENTANYL CITRATE 25 MCG: 50 INJECTION, SOLUTION INTRAMUSCULAR; INTRAVENOUS at 08:52

## 2018-05-23 RX ADMIN — LIDOCAINE HYDROCHLORIDE 8 ML: 10 INJECTION, SOLUTION INFILTRATION; PERINEURAL at 08:29

## 2018-05-23 RX ADMIN — ONDANSETRON 4 MG: 2 INJECTION INTRAMUSCULAR; INTRAVENOUS at 08:18

## 2018-05-23 RX ADMIN — MIDAZOLAM HYDROCHLORIDE 0.5 MG: 1 INJECTION, SOLUTION INTRAMUSCULAR; INTRAVENOUS at 08:27

## 2018-05-23 RX ADMIN — FENTANYL CITRATE 25 MCG: 50 INJECTION, SOLUTION INTRAMUSCULAR; INTRAVENOUS at 08:27

## 2018-05-23 RX ADMIN — LIDOCAINE 1 PATCH: 50 PATCH CUTANEOUS at 13:25

## 2018-05-23 RX ADMIN — ACETAMINOPHEN 650 MG: 325 TABLET ORAL at 11:56

## 2018-05-23 RX ADMIN — IOHEXOL 60 ML: 350 INJECTION, SOLUTION INTRAVENOUS at 15:02

## 2018-05-23 RX ADMIN — NEBIVOLOL HYDROCHLORIDE 5 MG: 5 TABLET ORAL at 11:57

## 2018-05-23 RX ADMIN — POTASSIUM CHLORIDE 20 MEQ: 200 INJECTION, SOLUTION INTRAVENOUS at 08:44

## 2018-05-23 RX ADMIN — NEBIVOLOL HYDROCHLORIDE 10 MG: 10 TABLET ORAL at 09:32

## 2018-05-23 NOTE — DISCHARGE SUMMARY
Discharge Summary - [facility] Internal Medicine    Patient Information: Michel Desir 68 y o  female MRN: [id number]  Unit/Bed#: [address] [address] Encounter: [id number]    Discharging Physician / Practitioner: Vibha Bell DO  PCP: Sanaz Mcgill MD  Admission Date: 5/19/2018  Discharge Date: 05/23/18    Reason for Admission: Chest Pain (pt c/o cp 7/10 started at 0400 today, pt was given 2 nitro sl by medics and cp went down to 2/10 per medics  pt dx with lung cancer last year and pt of Dr Marv Ziegler)      Discharge Diagnoses:     Principal Problem:    Chest pain  Active Problems:    Paroxysmal atrial fibrillation ([facility] )    Borderline diabetes    Non-small cell carcinoma of left lung ([facility] )  Resolved Problems:    Gastroenteritis        * Chest pain   Assessment & Plan    She was seen by Cardiology  Serial cardiac enzymes were negative  Patient did have a nuclear stress test in January 2017 which was normal  Patient does have extensive family history and Cardiac catheterization was done which showed no angiographic evidence of significant atherosclerosis  Minimally elevated filling pressures was noted  normal EF  Cardiology follow-up after discharge          Paroxysmal atrial fibrillation [facility])   Assessment & Plan    Currently in sinus rhythm  Continue Bystolic  Coumadin was on hold for cardiac catheterization  Restart Coumadin tomorrow and PT/INR as outpatient and follow up with PMD on results        Gastroenteritisresolved as of 5/23/2018   Assessment & Plan    Patient with nausea, vomiting and diarrhea which has now resolved  Likely infectious enteritis  CT scan of the abdomen and pelvis done showed possible mild sigmoid colitis    She was seen by GI  Patient was started on IV ciprofloxacin and Flagyl which was stopped later as her stool studies were negative and diarrhea resolved        Borderline diabetes   Assessment & Plan    Diet controlled        Non-small cell carcinoma of left lung (HCC)   Assessment & Plan    With metastasis  Patient refused chemotherapy  Consultations During Hospital Stay:  IP CONSULT TO CARDIOLOGY  IP CONSULT TO GASTROENTEROLOGY    Procedures Performed:     · Cardiac catheterization    Significant Findings:     · See hospital course and above    Imaging while in hospital:    Xr Chest 1 View Portable    Result Date: 5/19/2018  Narrative: CHEST INDICATION:   Chest pain  COMPARISON:  1/20/2014 EXAM PERFORMED/VIEWS:  XR CHEST PORTABLE FINDINGS: Cardiomediastinal silhouette appears unremarkable  Right perihilar lung mass again visualized  No new consolidation  No pneumothorax or pleural effusion  Osseous structures appear within normal limits for patient age  Impression: Right perihilar lung mass again visualized  No new consolidation  Workstation performed: [id number]     Ct Abdomen Pelvis W Contrast    Result Date: 5/19/2018  Narrative: CT ABDOMEN AND PELVIS WITH IV CONTRAST INDICATION:   Diffuse abdominal pain, gastroenteritis or colitis suspected  COMPARISON: November 29, 2017 TECHNIQUE:  CT examination of the abdomen and pelvis was performed  Axial, sagittal, and coronal 2D reformatted images were created from the source data and submitted for interpretation  Radiation dose length product (DLP) for this visit:  453 14 mGy-cm   This examination, like all CT scans performed in the [facility], was performed utilizing techniques to minimize radiation dose exposure, including the use of iterative  reconstruction and automated exposure control  IV Contrast:  50 mL of iohexol (OMNIPAQUE) 100 mL of iohexol (OMNIPAQUE) Enteric Contrast:  Enteric contrast was not administered  FINDINGS: ABDOMEN LOWER CHEST:  Right middle lobe lung neoplasm and subcarinal adenopathy/metastatic disease  Small right pleural effusion  LIVER/BILIARY TREE:  Liver is diffusely decreased in density consistent with fatty change  No CT evidence of suspicious hepatic mass    Normal hepatic contours  No biliary dilatation  GALLBLADDER:  Gallbladder is surgically absent  SPLEEN:  Unremarkable  PANCREAS:  Unremarkable  ADRENAL GLANDS:  Unremarkable  KIDNEYS/URETERS:  Unremarkable  No hydronephrosis  STOMACH AND BOWEL:  Mild wall thickening of the sigmoid colon and descending colon could relate to underdistention or a very mild colitis  APPENDIX:  No findings to suggest appendicitis  ABDOMINOPELVIC CAVITY:  No ascites or free intraperitoneal air  No lymphadenopathy  VESSELS:  Unchanged indentation along the superior celiac axis 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 is also unchanged  PELVIS REPRODUCTIVE ORGANS:  Patient is status post hysterectomy  URINARY BLADDER:  Unremarkable  ABDOMINAL WALL/INGUINAL REGIONS:  Unremarkable  OSSEOUS STRUCTURES:  No acute fracture or destructive osseous lesion  Impression: Right middle lobe lung neoplasm and worsening subcarinal adenopathy/metastatic disease  Small right pleural effusion  Mild wall thickening of the sigmoid colon and descending colon could relate to normal underdistention or a very mild colitis as suspected  Other unchanged chronic findings as above  Workstation performed: [id number]       Incidental Findings:   · none    Test Results Pending at Discharge (will require follow up):   · As per After Visit Summary     Outpatient Tests Requested:  · PT/INR    Complications:  See hospital course and above    Hospital Course:     Ruby Campos is a 68 y o  female patient who originally presented to the hospital on 5/19/2018 due to vomiting, diarrhea, chest pain  She was given nitroglycerin by EMS with resolution of chest pain  Patient was admitted to telemetry and seen by Cardiology  She underwent cardiac catheterization  She was also seen by GI for possible colitis which was noted on CT scan  Nausea, vomiting, diarrhea resolved    She was cleared by all consultants involved in her care prior to discharge home  She was advised to not start the Coumadin till tomorrow  Discharge plan was discussed with not only the patient but also her sister over the phone    Please see above list of diagnoses and related plan for additional information  Condition at Discharge: stable     Discharge Day Visit / Exam:     Subjective:  Denies any chest pain, shortness of breath    Vitals: Blood Pressure: 155/60 (05/23/18 0943)  Pulse: 93 (05/23/18 0943)  Temperature: 99 4 °F (37 4 °C) (05/23/18 0930)  Temp Source: Tympanic (05/23/18 0902)  Respirations: 18 (05/23/18 0943)  Height: 5' (152 4 cm) (05/19/18 1140)  Weight - Scale: 48 9 kg (107 lb 14 4 oz) (05/19/18 1140)  SpO2: 91 % (05/23/18 0930)  Exam:   Physical Exam   Constitutional: She is oriented to person, place, and time  She appears well-developed and well-nourished  No distress  HENT:   Head: Normocephalic and atraumatic  Eyes: EOM are normal  Right eye exhibits no discharge  Left eye exhibits no discharge  No scleral icterus  Neck: Neck supple  No tracheal deviation present  Cardiovascular: Normal rate and regular rhythm  Pulmonary/Chest: Effort normal and breath sounds normal  No respiratory distress  She has no wheezes  She has no rales  Abdominal: Soft  Bowel sounds are normal  She exhibits no distension  There is no tenderness  Musculoskeletal: She exhibits no edema  Neurological: She is alert and oriented to person, place, and time  No cranial nerve deficit  Skin: Skin is dry  She is not diaphoretic  Psychiatric: She has a normal mood and affect  Discharge instructions/Information to patient and family:(Discharge Medications and Follow up):   See after visit summary for information provided to patient and family  Provisions for Follow-Up Care:  See after visit summary for information related to follow-up care and any pertinent home health orders        Disposition: Home    Planned Readmission:  No     Discharge Statement:  I spent > 30 minutes discharging the patient  This time was spent on the day of discharge  I had direct contact with the patient on the day of discharge  Greater than 50% of the total time was spent examining patient, answering all patient questions, arranging and discussing plan of care with patient as well as directly providing post-discharge instructions  Additional time then spent on discharge activities  Discharge Medications:  See after visit summary for reconciled discharge medications provided to patient and family  ** Please Note:  Dictation voice to text software may have been used in the creation of this document   **

## 2018-05-23 NOTE — ASSESSMENT & PLAN NOTE
She was seen by Cardiology  Serial cardiac enzymes were negative  Patient did have a nuclear stress test in January 2017 which was normal  Patient does have extensive family history and Cardiac catheterization was done which showed no angiographic evidence of significant atherosclerosis  Minimally elevated filling pressures was noted   normal EF  Cardiology follow-up after discharge

## 2018-05-23 NOTE — DISCHARGE INSTRUCTIONS
Follow up with your primary care doctor regarding your INR result to see if Coumadin dose needs to be adjusted    Start your Coumadin tomorrow on 5/24/18

## 2018-05-23 NOTE — PROCEDURES
Procedures  Cardiac Catheterization Operative Report    Britney Morataya  [id number]  5/23/2018  Lieutenant Shukri MD    Indication:  Recurrent admission for chest pain, strong family history of CAD  Procedures performed:   Coronary angiogram          Left heart catheterization                                           Ventriculogram                                           R Femoral Angiogram  Access:  Right femoral 4Fr  Contrast: 75cc    Procedure Details  The risks, benefits, complications, treatment options, and expected outcomes were discussed with the patient  The patient and/or family concurred with the proposed plan, giving informed consent  Patient was brought to the cath lab after IV hydration was begun and oral premedication was given  She was further sedated with fentanyl and midazolam  She was prepped and draped in the usual manner  Using the modified Seldinger access technique, a 4 Turkish sheath was placed in the femoral artery  A JR 3 5 catheter crossed the aortic valve to perform to perform a left heart catheterization and ventriculogram   It was exchanged for a Tiger catheter which was used to cannulate the left coronary- multiple views were obtained  A femoral angiogram was completed which showed the arteriotomy below the inguinal crest  After the procedure was completed, sedation was stopped and the sheaths and catheters were all removed  Hemostasis was achieved with manual pressure  Findings:    Hemodynamics  LVEDP 11, no significant gradient pullback   Left Main  Short left main which immediately bifurcates to LAD and left circumflex widely patent   RCA  Dominant average caliber vessel which supplies an average posterior descending artery    Widely patent without angiographic evidence of significant atherosclerosis   LAD  Average caliber vessel which distally tapers and supplies the inferior apex   Circ  Average caliber nondominant vessel without angiographic evidence of significant atherosclerosis    LV  Normal [id number]%, No focal wall motion abnormalities       Complications  None     Estimated Blood Loss:  Minimal         Complications:  None; patient tolerated the procedure well             Disposition: hemodynamically stable           Condition: stable    A/P No angiographic evidence of significant atherosclerosis         Minimally elevated filling pressures plus normal EF         Likely noncardiac etiology for for recurrent chest pain

## 2018-05-23 NOTE — PLAN OF CARE
CARDIOVASCULAR - ADULT     Maintains optimal cardiac output and hemodynamic stability Adequate for Discharge     Absence of cardiac dysrhythmias or at baseline rhythm Adequate for Discharge        DISCHARGE PLANNING - CARE MANAGEMENT     Discharge to post-acute care or home with appropriate resources Adequate for Discharge        GASTROINTESTINAL - ADULT     Minimal or absence of nausea and/or vomiting Adequate for Discharge     Maintains adequate nutritional intake Adequate for Discharge        Potential for Falls     Patient will remain free of falls Adequate for Discharge

## 2018-05-23 NOTE — NURSING NOTE
Patient Discharged to home, medication and discharge instructions given, patient verbalized understanding, lab prescriptions given, IV removed, belongings gathered, patient left unit via wheelchair with CNA

## 2018-05-23 NOTE — ASSESSMENT & PLAN NOTE
Currently in sinus rhythm  Continue Bystolic  Coumadin was on hold for cardiac catheterization    Restart Coumadin tomorrow and PT/INR as outpatient and follow up with PMD on results

## 2018-05-23 NOTE — PROGRESS NOTES
Progress Note - Cardiology   Jasmina Baez 68 y o  female MRN: [id number]  Unit/Bed#: [address] [place] [address] Encounter: [id number]    Assessment/Plan:  Chest pain rule out acute coronary syndrome-cardiac catheterization with out angiographic evidence of significant atherosclerosis  Reviewed films with the patient  Unclear etiology of her recurrent chest discomfort  Possible GI source  Gastroenteritis/mild colitis     Right perihilar lung mass-patient with history of non-small cell lung cancer with metastasis  Has declined chemotherapy      Paroxysmal atrial fibrillation- continue Bystolic plus hold anticoagulation given possible need for cardiac catheterization     Borderline diabetes       Subjective/Objective   No chest pain or leg pain post procedure  Reviewed films with the patient  Objective:     Vitals: /60 (BP Location: Right arm)   Pulse 79   Temp 98 2 °F (36 8 °C) (Temporal)   Resp 18   Ht 5' (1 524 m)   Wt 48 9 kg (107 lb 14 4 oz)   SpO2 94%   BMI 21 07 kg/m²   Vitals:    05/19/18 0904 05/19/18 1140   Weight: 48 1 kg (106 lb) 48 9 kg (107 lb 14 4 oz)     Orthostatic Blood Pressures      Most Recent Value   Blood Pressure  137/60 filed at 05/23/2018 1537   Patient Position - Orthostatic VS  Lying filed at 05/23/2018 1537          No intake or output data in the 24 hours ending 05/23/18 1754    Invasive Devices          No matching active lines, drains, or airways        Physical Exam   Constitutional: She is oriented to person, place, and time  No distress  HENT:   Head: Normocephalic and atraumatic  Right Ear: External ear normal    Left Ear: External ear normal    Eyes: Conjunctivae are normal  Pupils are equal, round, and reactive to light  Right eye exhibits no discharge  Left eye exhibits no discharge  No scleral icterus  Neck: Normal range of motion  Neck supple  No JVD present  No tracheal deviation present  No thyromegaly present     Cardiovascular: Normal rate and regular rhythm  Exam reveals no gallop and no friction rub  No murmur heard  Right femoral clear dry intact no evidence of hematoma   Pulmonary/Chest: Effort normal and breath sounds normal  No stridor  No respiratory distress  She has no wheezes  She has no rales  She exhibits no tenderness  Abdominal: Soft  Bowel sounds are normal  She exhibits no distension and no mass  There is no tenderness  There is no rebound and no guarding  Musculoskeletal: Normal range of motion  She exhibits no edema, tenderness or deformity  Neurological: She is alert and oriented to person, place, and time  She has normal reflexes  No cranial nerve deficit  She exhibits normal muscle tone  Coordination normal    Skin: Skin is warm and dry  No rash noted  She is not diaphoretic  No erythema  No pallor  Psychiatric: She has a normal mood and affect  Her behavior is normal  Judgment and thought content normal        Review of Systems: reviewed    Lab Results: I have personally reviewed pertinent lab results  Imaging: I have personally reviewed pertinent reports  EKG: reviewed  VTE Pharmacologic Prophylaxis: Sequential compression device (Venodyne)   VTE Mechanical Prophylaxis: sequential compression device    Counseling / Coordination of Care  Total time spent today 30 minutes  Greater than 50% of total time was spent with the patient and / or family counseling and / or coordination of care   A description of the counseling / coordination of care: chest pain/acs

## 2018-05-23 NOTE — ASSESSMENT & PLAN NOTE
Patient with nausea, vomiting and diarrhea which has now resolved  Likely infectious enteritis  CT scan of the abdomen and pelvis done showed possible mild sigmoid colitis    She was seen by GI  Patient was started on IV ciprofloxacin and Flagyl which was stopped later as her stool studies were negative and diarrhea resolved

## 2018-05-23 NOTE — PROGRESS NOTES
[address] Internal Medicine Progress Note  Patient: Rajiv Mcdermott 68 y o  female   MRN: [id number]  PCP: Linad Arnold MD  Unit/Bed#: [address] [place] [address] Encounter: [id number]  Date Of Visit: 05/22/18    Problem List:    Principal Problem:    Chest pain  Active Problems:    Gastroenteritis    Parox[facility]mal [facility] fibrillation ([facility] )    Borderline diabetes    Non-small cell carcinoma of left lung [facility])      Assessment & Plan:    * Chest pain   Assessment & Plan    Presentation is atypical  Serial cardiac enzymes were negative  Patient did have a nuclear stress test in January 2017 which was normal  Patient does have extensive family history  Cardiac catheterization in AM               Paroxysmal atrial fibrillation ([facility] )   Assessment & Plan    Currently in sinus rhythm  Continue Bystolic  Coumadin on hold for cardiac catheterization  Gastroenteritis   Assessment & Plan    Patient with nausea, vomiting and diarrhea which has improved  Likely infectious enteritis  CT scan of the abdomen and pelvis done showed possible mild sigmoid colitis  Patient was started on IV ciprofloxacin and Flagyl which was stopped later as her stool studies were negative and diarrhea improved        Borderline diabetes   Assessment & Plan    Diet controlled  Non-small cell carcinoma of left lung [facility])   Assessment & Plan    With metastasis  Patient refused chemotherapy              VTE Pharmacologic Prophylaxis:   Pharmacologic: Warfarin (Coumadin) d/c'ed for cardiac cath  Mechanical VTE Prophylaxis in Place: No    Patient Centered Rounds: I have performed bedside rounds with nursing staff today  Discussions with Specialists or Other Care Team Provider: Yes    Education and Discussions with Family / Patient:Yes    Time Spent for Care: 30 minutes  More than 50% of total time spent on counseling and coordination of care as described above      Current Length of Stay: 2 day(s)    Current Patient Status: Inpatient     Discharge Plan: home    Code Status: Level 3 - DNAR and DNI    Certification Statement: The patient will continue to require additional inpatient hospital stay due to Chest pain requiring cardiac catheterization      Subjective:   Reports intermittent chest heaviness and pain  Denies any vomiting    Objective:     Vitals:   Temp (24hrs), Av 8 °F (37 1 °C), Min:98 5 °F (36 9 °C), Max:99 3 °F (37 4 °C)    HR:  [77-86] 86  Resp:  [18] 18  BP: (121-139)/(58-64) 124/60  SpO2:  [91 %-94 %] 93 %  Body mass index is 21 07 kg/m²  Input and Output Summary (last 24 hours):     No intake or output data in the 24 hours ending 18    Physical Exam:     Physical Exam   Constitutional: She is oriented to person, place, and time  She appears well-developed and well-nourished  No distress  HENT:   Head: Normocephalic and atraumatic  Eyes: EOM are normal  Right eye exhibits no discharge  Left eye exhibits no discharge  No scleral icterus  Neck: Neck supple  No tracheal deviation present  Cardiovascular: Normal rate and regular rhythm  Pulmonary/Chest: Effort normal and breath sounds normal  No respiratory distress  She has no wheezes  She has no rales  Abdominal: Soft  Bowel sounds are normal  She exhibits no distension  There is no tenderness  Musculoskeletal: She exhibits no edema  Neurological: She is alert and oriented to person, place, and time  No cranial nerve deficit  Skin: Skin is dry  She is not diaphoretic  Psychiatric: She has a normal mood and affect         Additional Data:     Labs:      Results from last 7 days  Lab Units 18  0536   WBC Thousand/uL 5 89   HEMOGLOBIN g/dL 10 1*   HEMATOCRIT % 33 5*   PLATELETS Thousands/uL 314   NEUTROS PCT % 61   LYMPHS PCT % 28   MONOS PCT % 10   EOS PCT % 1       Results from last 7 days  Lab Units 18  0435 18  0948   SODIUM mmol/L 139 137   POTASSIUM mmol/L 3 6 4 1   CHLORIDE mmol/L 106 100   CO2 mmol/L 23 21   BUN mg/dL 7 10 CREATININE mg/dL 0 86 0 99   CALCIUM mg/dL 8 0* 8 7   TOTAL PROTEIN g/dL  --  8 3*   BILIRUBIN TOTAL mg/dL  --  0 30   ALK PHOS U/L  --  46   ALT U/L  --  30   AST U/L  --  27   GLUCOSE RANDOM mg/dL 96 109       Results from last 7 days  Lab Units 05/22/18  0536   INR  1 65*       * I Have Reviewed All Lab Data Listed Above  * Additional Pertinent Lab Tests Reviewed: All Labs For Current Hospital Admission Reviewed    Imaging:  Xr Chest 1 View Portable    Result Date: 5/19/2018  Narrative: CHEST INDICATION:   Chest pain  COMPARISON:  1/20/2014 EXAM PERFORMED/VIEWS:  XR CHEST PORTABLE FINDINGS: Cardiomediastinal silhouette appears unremarkable  Right perihilar lung mass again visualized  No new consolidation  No pneumothorax or pleural effusion  Osseous structures appear within normal limits for patient age  Impression: Right perihilar lung mass again visualized  No new consolidation  Workstation performed: [id number]     Ct Abdomen Pelvis W Contrast    Result Date: 5/19/2018  Narrative: CT ABDOMEN AND PELVIS WITH IV CONTRAST INDICATION:   Diffuse abdominal pain, gastroenteritis or colitis suspected  COMPARISON: November 29, 2017 TECHNIQUE:  CT examination of the abdomen and pelvis was performed  Axial, sagittal, and coronal 2D reformatted images were created from the source data and submitted for interpretation  Radiation dose length product (DLP) for this visit:  453 14 mGy-cm   This examination, like all CT scans performed in the [facility], was performed utilizing techniques to minimize radiation dose exposure, including the use of iterative  reconstruction and automated exposure control  IV Contrast:  50 mL of iohexol (OMNIPAQUE) 100 mL of iohexol (OMNIPAQUE) Enteric Contrast:  Enteric contrast was not administered  FINDINGS: ABDOMEN LOWER CHEST:  Right middle lobe lung neoplasm and subcarinal adenopathy/metastatic disease  Small right pleural effusion   LIVER/BILIARY TREE:  Liver is diffusely decreased in density consistent with fatty change  No CT evidence of suspicious hepatic mass  Normal hepatic contours  No biliary dilatation  GALLBLADDER:  Gallbladder is surgically absent  SPLEEN:  Unremarkable  PANCREAS:  Unremarkable  ADRENAL GLANDS:  Unremarkable  KIDNEYS/URETERS:  Unremarkable  No hydronephrosis  STOMACH AND BOWEL:  Mild wall thickening of the sigmoid colon and descending colon could relate to underdistention or a very mild colitis  APPENDIX:  No findings to suggest appendicitis  ABDOMINOPELVIC CAVITY:  No ascites or free intraperitoneal air  No lymphadenopathy  VESSELS:  Unchanged indentation along the superior celiac axis 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 is also unchanged  PELVIS REPRODUCTIVE ORGANS:  Patient is status post hysterectomy  URINARY BLADDER:  Unremarkable  ABDOMINAL WALL/INGUINAL REGIONS:  Unremarkable  OSSEOUS STRUCTURES:  No acute fracture or destructive osseous lesion  Impression: Right middle lobe lung neoplasm and worsening subcarinal adenopathy/metastatic disease  Small right pleural effusion  Mild wall thickening of the sigmoid colon and descending colon could relate to normal underdistention or a very mild colitis as suspected  Other unchanged chronic findings as above   Workstation performed: [id number]     Imaging Reports Reviewed by myself    Cultures:   Blood Culture: No results found for: BLOODCX  Urine Culture:   Lab Results   Component Value Date    URINECX 40,000-49,000 cfu/ml Mixed Contaminants X4 06/27/2016     Sputum Culture: No components found for: SPUTUMCX  Wound Culture: No results found for: WOUNDCULT    Last 24 Hours Medication List:     Current Facility-Administered Medications:  acetaminophen 650 mg Oral Q6H PRN Sherry Rivas MD    albuterol 1 puff Inhalation Q6H PRN Sherry Rivsa MD    calcium carbonate-vitamin D 1 tablet Oral Daily With Breakfast Silva Anderson MD    influenza vaccine 0 5 mL Intramuscular Prior to discharge Silva Anderson MD    lidocaine 1 patch Transdermal Daily Silva Anderson MD    nebivolol 10 mg Oral QAM Silva Anderson MD    nebivolol 5 mg Oral Daily With Lunch Silva Anderson MD    ondansetron 4 mg Intravenous Q6H PRN Silva Anderson MD    pantoprazole 40 mg Oral QAM Silva Anderson MD    sodium chloride 50 mL/hr Intravenous Continuous Silva Anderson MD Last Rate: 50 mL/hr (05/22/18 1939)        Today, Patient Was Seen By: Janneth Black DO    ** Please Note: Dragon 360 Dictation voice to text software may have been used in the creation of this document   **

## 2018-07-09 ENCOUNTER — TELEPHONE (OUTPATIENT)
Dept: HEMATOLOGY ONCOLOGY | Facility: MEDICAL CENTER | Age: 78
End: 2018-07-09

## 2018-07-09 NOTE — TELEPHONE ENCOUNTER
pts family wanted her to call us and let us know she is coughing up blood occasionally  Pt has refused any type of chemotherapy  Advised if s/s worsen she can go to ED  She does not want to do that either  Pt states she will keep f/u appt with Dr Moss Salvage next month

## 2018-08-01 ENCOUNTER — APPOINTMENT (OUTPATIENT)
Dept: LAB | Facility: CLINIC | Age: 78
End: 2018-08-01
Payer: MEDICARE

## 2018-08-01 DIAGNOSIS — I48.0 PAROXYSMAL ATRIAL FIBRILLATION (HCC): Primary | ICD-10-CM

## 2018-08-01 LAB
INR PPP: 2.46 (ref 0.86–1.17)
PROTHROMBIN TIME: 26.7 SECONDS (ref 11.8–14.2)

## 2018-08-01 PROCEDURE — 85610 PROTHROMBIN TIME: CPT

## 2018-08-01 PROCEDURE — 36415 COLL VENOUS BLD VENIPUNCTURE: CPT

## 2018-08-15 ENCOUNTER — OFFICE VISIT (OUTPATIENT)
Dept: HEMATOLOGY ONCOLOGY | Facility: MEDICAL CENTER | Age: 78
End: 2018-08-15
Payer: MEDICARE

## 2018-08-15 VITALS
WEIGHT: 95 LBS | HEIGHT: 60 IN | RESPIRATION RATE: 18 BRPM | TEMPERATURE: 97.1 F | BODY MASS INDEX: 18.65 KG/M2 | OXYGEN SATURATION: 96 % | DIASTOLIC BLOOD PRESSURE: 82 MMHG | SYSTOLIC BLOOD PRESSURE: 130 MMHG | HEART RATE: 78 BPM

## 2018-08-15 DIAGNOSIS — C34.92 NON-SMALL CELL CARCINOMA OF LEFT LUNG (HCC): Primary | ICD-10-CM

## 2018-08-15 PROCEDURE — 99214 OFFICE O/P EST MOD 30 MIN: CPT | Performed by: INTERNAL MEDICINE

## 2018-08-15 RX ORDER — IBANDRONATE SODIUM 150 MG/1
150 TABLET, FILM COATED ORAL
Refills: 0 | COMMUNITY
Start: 2018-05-30

## 2018-08-15 NOTE — PROGRESS NOTES
Valentine Coffee  1940  [address] [facility] [place] [id number]    DISCUSSION  SUMMARY:    40-year-old female recently diagnosed with right upper lobe bronchogenic carcinoma  PET/CT demonstrated M1/stage IV disease  Patient never smoked but was exposed to secondhand smoke and worked in a blasting factory  Issues:      1  Non-small cell lung carcinoma  As above, patient feels relatively well from a lung cancer standpoint  We previously discussed at length the PET/CT findings and the fact that patient has metastatic/usually incurable disease  As discussed previously, Mrs Siomara Castellon does not wish to be treated with chemotherapy or radiotherapy (patient's daughter-in-law apparently had a very difficult time with chemotherapy while being treated for [facility])  ALK, EGFR, PDL1 and ROS1 were negative for abnormalities  The plan is continued observation  Mrs Baez is to return in 3 months  As discussed below, patient called with an episode of hemoptysis  Etiology unclear but this resolved without treatment  We once again discussed the fact that eventually the tumor will likely grow and cause issues  Patient has completed the FIVE WISHES - form is on the chart  2  Right upper extremity weakness/pain  Patient underwent right shoulder surgery last fall  Mrs Baez states that the pain is gone - this is obviously good  Patient will monitor for any other pain control issues       3  Pain control  As above no pain control issues at this time  Patient uses Tylenol as needed    4  Bone metastases  Patient denies any pain in the areas that were abnormal on PET/CT  As before, patient does not wish to begin a rank Ligand inhibitor or bisphosphonate  Patient knows to call the hematology/oncology office if she has any other questions or concerns  Carefully review your medication list and verify that the list is accurate and up-to-date  Please call the hematology/oncology office if there are medications missing from the list, medications on the list that you are not currently taking or if there is a dosage or instruction that is different from how you're taking that medication  Patient goals and areas of care:  Monitor for any signs of lung cancer progression  Patient is able to self-care   _____________________________________________________________________________________    Chief Complaint   Patient presents with    Follow-up     Metastatic non-small cell lung carcinoma     Advance Care Planning/Advance Directives:  Discussed today - patient needs to further discuss with family members at home  History of Present Illness:    79-year-old female previously referred for the above  Mrs Baez had right shoulder rotator cuff repair in September 2017  Postoperatively, during the physical therapy phase, patient was found to have a right supraclavicular mass  Patient has been seen by surgery and pulmonary  Recent biopsy demonstrated adenocarcinoma  Additional workup demonstrated metastatic disease  On a previous office visit, we discussed options  Patient stated that she would not allow chemotherapy or radiotherapy but would be interested immuno therapy or possibly an oral treatment if she had a mutation  Genetic testing was recently re-submitted to the pathology department - results are listed below  No mutations were found  Mrs Baez states feeling well  Right shoulder pain is gone, no other body aches  Patient had 1 episode of hemoptysis after a coughing fit  This resolved on its own  No fevers, chills or sweats  No shortness of breath or dyspnea on exertion  Appetite is good, weight is stable  No headaches, blurred vision, dizziness or syncopal episodes  Activities are baseline  No other GI,  or GYN issues  Review of Systems   Constitutional: Negative  HENT: Negative  Eyes: Negative  Respiratory: Negative  Cardiovascular: Negative  Gastrointestinal: Negative  Endocrine: Negative  Genitourinary: Negative  Musculoskeletal: Positive for arthralgias  Skin: Negative  Allergic/Immunologic: Negative  Neurological: Negative  Hematological: Negative  Psychiatric/Behavioral: Negative  All other systems reviewed and are negative  Patient Active Problem List   Diagnosis    Anxiety    Angular stomatitis    Chest pain    History of pulmonary embolism    Nodule of right lung    Complete tear of right rotator cuff    Non-small cell carcinoma of left lung (HCC)    Subacromial bursitis of right shoulder joint    [facility]oxysmal atrial fibrillation ([facility]    Borderline diabetes     Past Medical History:   Diagnosis Date    Arthritis     Atrial fibrillation ([facility] )     Borderline diabetes     watches diet    Diverticulitis     Fracture, foot 12/30/2016    right foot-no surgical intervention    Full dentures     GERD (gastroesophageal reflux disease)     Hearing aid worn     both ears    Hearing decreased, bilateral     has hearing    History of anemia 2016    transfused with 2 units-unknown etiology    History of transfusion 2016    2 units    Hypertension     Metastatic primary lung cancer, right ([address] )     Osteoporosis     Pulmonary embolism (HCC)     x2 episodes-on warfarin    Sciatica     Tachycardia     Wears glasses     for reading     Past Surgical History:   Procedure Laterality Date    CARPAL TUNNEL RELEASE Right     CATARACT EXTRACTION Bilateral     CHOLECYSTECTOMY      open    COLONOSCOPY      EGD AND COLONOSCOPY N/A 7/1/2016    Procedure: EGD AND COLONOSCOPY;  Surgeon: Barbara Jane MD;  Location: [facility];   Service:     HYSTERECTOMY      one ovary remains    LYMPH NODE BIOPSY Right 1/24/2018    Procedure: EXCISIONAL BIOPSY SUPRACLAVICULAR LYMPH NODE;  Surgeon: Kat Mercado MD;  Location: [address] [place] [address];  Service: General    NECK SURGERY      bone spur with bone transplant  2nd surgery "pinched nerve"    NM SHLDR ARTHROSCOP,SURG,W/ROTAT CUFF REPR Right 9/11/2017    Procedure: SHOULDER ARTHROSCOPY WITH ROTATOR CUFF REPAIR, BICEPS TENOTOMY AND SUBACROMIAL DECOMPRESSION;  Surgeon: Raheem Mckee MD;  Location: [place] [address] MAIN OR;  Service: Orthopedics    ROTATOR CUFF REPAIR Left 01/08/2015     Family History   Problem Relation Age of Onset    Diabetes Mother     Heart attack Mother     Heart attack Father     Diabetes Sister         insulin dependent    Heart disease Sister         CABG    Diabetes Brother         insulin dependent    Heart disease Brother         CABG     Social History     Social History    Marital status: Single     Spouse name: N/A    Number of children: N/A    Years of education: N/A     Occupational History    Not on file  Social History Main Topics    Smoking status: Never Smoker    Smokeless tobacco: Never Used    Alcohol use No    Drug use: No    Sexual activity: No     Other Topics Concern    Not on file     Social History Narrative    No narrative on file       Current Outpatient Prescriptions:     acetaminophen (TYLENOL) 325 mg tablet, Take 2 tablets (650 mg total) by mouth every 6 (six) hours as needed for mild pain, headaches or fever  , Disp: 30 tablet, Rfl: 0    albuterol (PROVENTIL HFA,VENTOLIN HFA) 90 mcg/act inhaler, Inhale 1-2 puffs every 6 (six) hours as needed for wheezing, Disp: 1 Inhaler, Rfl: 0    Calcium Carb-Cholecalciferol (CALCIUM 600-D PO), Take by mouth Monday thru Friday, Disp: , Rfl:     Cholecalciferol (VITAMIN D PO), Take 3,000 Units by mouth Monday thru Friday, Disp: , Rfl:     colesevelam (WELCHOL) 625 mg tablet, Take 1,875 mg by mouth 2 (two) times a day with meals  , Disp: , Rfl:     GLUCOSAMINE SULFATE PO, Take 2,000 mg by mouth daily  , Disp: , Rfl:     ibandronate (BONIVA) 150 MG tablet, Take 150 mg by mouth every 30 (thirty) days, Disp: , Rfl: 0    Lidocaine (LIDODERM EX), Apply topically 2 (two) times a day Lower back, pt used OTC cream on LB , Disp: , Rfl:     lidocaine (XYLOCAINE) 5 % ointment, Apply topically, Disp: , Rfl:     nebivolol (BYSTOLIC) 10 mg tablet, Take 10 mg by mouth every morning  , Disp: , Rfl:     nebivolol (BYSTOLIC) 5 mg tablet, Take 5 mg by mouth daily with lunch  , Disp: , Rfl:     ondansetron (ZOFRAN) 4 mg tablet, TAKE 1 TABLET BY MOUTH TWICE A DAY AS NEEDED FOR NAUSEA, Disp: , Rfl: 1    pantoprazole (PROTONIX) 40 mg tablet, Take 40 mg by mouth every morning  , Disp: , Rfl:     risedronate (ACTONEL) 150 MG tablet, Take 150 mg by mouth every 30 (thirty) days with water on empty stomach, nothing by mouth or lie down for next 30 minutes  , Disp: , Rfl:     Warfarin Sodium (COUMADIN PO), Take 2 5 mg by mouth daily  , Disp: , Rfl:     Allergies   Allergen Reactions    Codeine Other (See Comments)     Patient experience chest pain    Cortisone Rash     Severe rash and redness    Pneumovax [Pneumococcal Polysaccharide Vaccine] Swelling     Swelling, red rash and pain at injection site    Penicillins     Asa [Aspirin] GI Intolerance       Vitals:    08/15/18 1504   BP: 130/82   Pulse: 78   Resp: 18   Temp: (!) 97 1 °F (36 2 °C)   SpO2: 96%     Physical Exam   Constitutional: She is oriented to person, place, and time  She appears well-developed and well-nourished  HENT:   Head: Normocephalic and atraumatic  Right Ear: External ear normal    Left Ear: External ear normal    Nose: Nose normal    Mouth/Throat: Oropharynx is clear and moist    Eyes: Conjunctivae and EOM are normal  Pupils are equal, round, and reactive to light  Neck: Normal range of motion  Neck supple  Cardiovascular: Normal rate, regular rhythm, normal heart sounds and intact distal pulses  Pulmonary/Chest: Effort normal and breath sounds normal    Rhonchi in right upper lung field   Abdominal: Soft  Bowel sounds are normal    Musculoskeletal: She exhibits no tenderness     Nontender, good range of motion in right shoulder    Neurological: She is alert and oriented to person, place, and time  She has normal reflexes  Skin: Skin is warm  Skin is warm, moist, relatively good color, no petechiae or ecchymoses   Psychiatric: She has a normal mood and affect  Her behavior is normal  Judgment and thought content normal      Performance Status: 1 - Symptomatic but completely ambulatory    Labs:    8/31/17 WBC = 6 1 hemoglobin = 12 2 hematocrit = 36 6 platelet = 064  Imaging    1/19/18 PET/CT    1  Enlarging right upper lobe hypermetabolic mass compatible with primary bronchogenic carcinoma of the lung  2   There is evidence of hypermetabolic adenopathy at the neck base, right supraclavicular fossa, mediastinum, and kimmie bilaterally  3   Osseous metastases detected in the right anterior sacrum and posterior right iliac crest   4   No soft tissue metastases detected within the abdomen or pelvis    11/29/17 CAT scan of the chest and abdomen/pelvis demonstrated a right upper lobe spiculated mass, 2 3 x 1 9 cm  Patient may have postobstructive pneumonitis  Patient had mild pulmonary emphysema and biapical pleural parenchymal scarring  Right hilar adenopathy was present  Patient had bilateral supra-clavicular adenopathy  There were no findings suggestive of metastatic disease in the abdomen or pelvis  No destructive osseous lesions were commented on       Pathology    03/06/2018 EGFR = new mutations identified, no evidence of ALK rearrangement or deletion, no evidence of ROS1 rearrangement  01/24/2018 supplemental pathology report: PDL1 expression = 0% = no expression      Surgical Pathology Report                         Case: [id number]                                    Authorizing Provider: Traci Silver MD       Collected:           01/24/2018 1002               Ordering Location:     [address] [place] [address] Received:            01/24/2018 1024                                      Operating Room                                                                Pathologist:           Destiny Ramos MD                                                         Intraop:               Destiny Ramos MD                                                         Specimen:    Lymph Node, RT supraclavicular lymph node                                                  Final Diagnosis   A  Lymph node, right supraclavicular, excision:  -  Metastatic adenocarcinoma consistent with pulmonary origin  -  Immunohistochemical stains performed with appropriate controls show the tumor to be positive for TTF-1, Napsin, and CK7 and negative for CK 20, supporting the diagnosis  -  Very scant residual lymphoid tissue is present with reactive features  Electronically signed by Destiny Ramos MD on 1/26/2018 at  1:07 PM       12/29/17 right supraclavicular lymph node core biopsy demonstrated metastatic adenocarcinoma compatible with a pulmonary origin  Immune stains demonstrated the lesion to be positive for TTF-1 and CK 7

## (undated) DEVICE — INTENDED FOR TISSUE SEPARATION, AND OTHER PROCEDURES THAT REQUIRE A SHARP SURGICAL BLADE TO PUNCTURE OR CUT.: Brand: BARD-PARKER SAFETY BLADES SIZE 11, STERILE

## (undated) DEVICE — GLOVE SRG BIOGEL 7.5

## (undated) DEVICE — TUBING ARTHROSCOPY REDUCE PATIENT

## (undated) DEVICE — PACK ARTHROSCOPY

## (undated) DEVICE — SYRINGE 10ML LL CONTROL TOP

## (undated) DEVICE — 3M™ TEGADERM™ TRANSPARENT FILM DRESSING FRAME STYLE, 1626W, 4 IN X 4-3/4 IN (10 CM X 12 CM), 50/CT 4CT/CASE: Brand: 3M™ TEGADERM™

## (undated) DEVICE — TUBING ARTHROSCOPY REDUCE PUMP

## (undated) DEVICE — POSITIONER TRIMANO LIMB BEACH CHAIR

## (undated) DEVICE — LABEL MEDICATION STERILE 2 YELLOW LIDOCAINE 2 BLUE MARCAINE 2 ORANGE HEPARIN

## (undated) DEVICE — SPONGE GAUZE 4 X 9

## (undated) DEVICE — INTENDED FOR TISSUE SEPARATION, AND OTHER PROCEDURES THAT REQUIRE A SHARP SURGICAL BLADE TO PUNCTURE OR CUT.: Brand: BARD-PARKER SAFETY BLADES SIZE 15, STERILE

## (undated) DEVICE — BLADE SHAVER TORPEDO 4MM 13CM  COOLCUT

## (undated) DEVICE — CANNULA BUTTON 8 X 30MM PASSPORT

## (undated) DEVICE — ADHESIVE SKN CLSR HISTOACRYL FLEX 0.5ML LF

## (undated) DEVICE — BURR RND 4MM 13CM 8 FLUTE COOLCUT

## (undated) DEVICE — CHLORAPREP HI-LITE 26ML ORANGE

## (undated) DEVICE — GLOVE INDICATOR PI UNDERGLOVE SZ 6.5 BLUE

## (undated) DEVICE — VAPR COOLPULSE 90 ELECTRODE WITH HAND CONTROLS 90 DEGREES SUCTION WITH INTEGRATED HANDPIECE: Brand: VAPR COOLPULSE

## (undated) DEVICE — BURR  OVAL 4MM 13CM 8 FLUTE COOLCUT

## (undated) DEVICE — NEEDLE 25G X 1 1/2

## (undated) DEVICE — FIBERTAPE 2MM X 7IN AR-7237-7

## (undated) DEVICE — NEEDLE SUT SCORPION MULTIFIRE

## (undated) DEVICE — BASIC DOUBLE BASIN 2-LF: Brand: MEDLINE INDUSTRIES, INC.

## (undated) DEVICE — SPECIMEN CONTAINER STERILE PEEL PACK

## (undated) DEVICE — TUBING ARTHROSCOPIC WAVE  MAIN PUMP

## (undated) DEVICE — LIGACLIP APPLIER SMALL

## (undated) DEVICE — GLOVE INDICATOR PI UNDERGLOVE SZ 7.5 BLUE

## (undated) DEVICE — SUT MONOCRYL 4-0 PS-2 18 IN Y496G

## (undated) DEVICE — ASTOUND IMPERVIOUS SURGICAL GOWN: Brand: CONVERTORS

## (undated) DEVICE — OCCLUSIVE GAUZE STRIP,3% BISMUTH TRIBROMOPHENATE IN PETROLATUM BLEND: Brand: XEROFORM

## (undated) DEVICE — GLOVE SRG BIOGEL 6.5

## (undated) DEVICE — PACK GENERAL LF

## (undated) DEVICE — PLUMEPEN PRO 10FT

## (undated) DEVICE — CANNULA 5.75 X 70MM BARREL SHAPED BOWL

## (undated) DEVICE — BLADE SHAVER EXCALIBUR 5.5MM 130CM COOLCUT

## (undated) DEVICE — BLADE SHAVER EXCALIBUR 4MM 13CM COOLCUT